# Patient Record
Sex: FEMALE | Race: WHITE | NOT HISPANIC OR LATINO
[De-identification: names, ages, dates, MRNs, and addresses within clinical notes are randomized per-mention and may not be internally consistent; named-entity substitution may affect disease eponyms.]

---

## 2017-01-04 ENCOUNTER — APPOINTMENT (OUTPATIENT)
Dept: CARDIOLOGY | Facility: CLINIC | Age: 67
End: 2017-01-04

## 2017-01-11 ENCOUNTER — EMERGENCY (EMERGENCY)
Facility: HOSPITAL | Age: 67
LOS: 1 days | Discharge: HOME | End: 2017-01-11
Admitting: EMERGENCY MEDICINE
Payer: MEDICARE

## 2017-01-11 DIAGNOSIS — M54.9 DORSALGIA, UNSPECIFIED: ICD-10-CM

## 2017-01-11 DIAGNOSIS — Z88.0 ALLERGY STATUS TO PENICILLIN: ICD-10-CM

## 2017-01-11 DIAGNOSIS — M51.16 INTERVERTEBRAL DISC DISORDERS WITH RADICULOPATHY, LUMBAR REGION: ICD-10-CM

## 2017-01-11 DIAGNOSIS — E03.9 HYPOTHYROIDISM, UNSPECIFIED: ICD-10-CM

## 2017-01-11 PROCEDURE — 99284 EMERGENCY DEPT VISIT MOD MDM: CPT

## 2017-01-11 PROCEDURE — 72110 X-RAY EXAM L-2 SPINE 4/>VWS: CPT | Mod: 26

## 2017-01-12 PROCEDURE — 96372 THER/PROPH/DIAG INJ SC/IM: CPT

## 2017-01-12 PROCEDURE — 99283 EMERGENCY DEPT VISIT LOW MDM: CPT | Mod: 25

## 2017-01-12 PROCEDURE — 72110 X-RAY EXAM L-2 SPINE 4/>VWS: CPT

## 2017-01-15 ENCOUNTER — RX RENEWAL (OUTPATIENT)
Age: 67
End: 2017-01-15

## 2017-02-26 ENCOUNTER — EMERGENCY (EMERGENCY)
Facility: HOSPITAL | Age: 67
LOS: 1 days | Discharge: HOME | End: 2017-02-26
Admitting: EMERGENCY MEDICINE
Payer: MEDICARE

## 2017-02-26 DIAGNOSIS — S51.851A OPEN BITE OF RIGHT FOREARM, INITIAL ENCOUNTER: ICD-10-CM
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
## 2017-02-26 DIAGNOSIS — Z88.0 ALLERGY STATUS TO PENICILLIN: ICD-10-CM
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
## 2017-02-26 DIAGNOSIS — E03.9 HYPOTHYROIDISM, UNSPECIFIED: ICD-10-CM

## 2017-02-26 PROCEDURE — 99284 EMERGENCY DEPT VISIT MOD MDM: CPT

## 2017-02-27 ENCOUNTER — INPATIENT (INPATIENT)
Facility: HOSPITAL | Age: 67
LOS: 1 days | Discharge: HOME | DRG: 603 | End: 2017-03-01
Attending: FAMILY MEDICINE | Admitting: HOSPITALIST
Payer: MEDICARE

## 2017-02-27 DIAGNOSIS — L03.113 CELLULITIS OF RIGHT UPPER LIMB: ICD-10-CM

## 2017-02-27 DIAGNOSIS — S61.451A OPEN BITE OF RIGHT HAND, INITIAL ENCOUNTER: ICD-10-CM

## 2017-02-27 DIAGNOSIS — E87.1 HYPO-OSMOLALITY AND HYPONATREMIA: ICD-10-CM
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
## 2017-02-27 DIAGNOSIS — S51.851A OPEN BITE OF RIGHT FOREARM, INITIAL ENCOUNTER: ICD-10-CM
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
## 2017-02-27 DIAGNOSIS — E03.9 HYPOTHYROIDISM, UNSPECIFIED: ICD-10-CM

## 2017-02-27 DIAGNOSIS — Z88.0 ALLERGY STATUS TO PENICILLIN: ICD-10-CM

## 2017-02-27 PROCEDURE — 96372 THER/PROPH/DIAG INJ SC/IM: CPT

## 2017-02-27 PROCEDURE — 99223 1ST HOSP IP/OBS HIGH 75: CPT

## 2017-02-27 PROCEDURE — 73090 X-RAY EXAM OF FOREARM: CPT | Mod: 26,LT

## 2017-02-27 PROCEDURE — 73130 X-RAY EXAM OF HAND: CPT | Mod: 26,RT

## 2017-02-27 PROCEDURE — 99283 EMERGENCY DEPT VISIT LOW MDM: CPT | Mod: 25

## 2017-02-28 PROCEDURE — 99233 SBSQ HOSP IP/OBS HIGH 50: CPT

## 2017-03-01 PROCEDURE — 73130 X-RAY EXAM OF HAND: CPT

## 2017-03-01 PROCEDURE — 80076 HEPATIC FUNCTION PANEL: CPT

## 2017-03-01 PROCEDURE — 90675 RABIES VACCINE IM: CPT

## 2017-03-01 PROCEDURE — 99285 EMERGENCY DEPT VISIT HI MDM: CPT | Mod: 25

## 2017-03-01 PROCEDURE — 85025 COMPLETE CBC W/AUTO DIFF WBC: CPT

## 2017-03-01 PROCEDURE — 96365 THER/PROPH/DIAG IV INF INIT: CPT

## 2017-03-01 PROCEDURE — 82550 ASSAY OF CK (CPK): CPT

## 2017-03-01 PROCEDURE — 80069 RENAL FUNCTION PANEL: CPT

## 2017-03-01 PROCEDURE — 99239 HOSP IP/OBS DSCHRG MGMT >30: CPT

## 2017-03-01 PROCEDURE — 80048 BASIC METABOLIC PNL TOTAL CA: CPT

## 2017-03-01 PROCEDURE — 90375 RABIES IG IM/SC: CPT

## 2017-03-01 PROCEDURE — 85610 PROTHROMBIN TIME: CPT

## 2017-03-01 PROCEDURE — 85027 COMPLETE CBC AUTOMATED: CPT

## 2017-03-01 PROCEDURE — 73090 X-RAY EXAM OF FOREARM: CPT

## 2017-03-01 PROCEDURE — 85730 THROMBOPLASTIN TIME PARTIAL: CPT

## 2017-03-29 ENCOUNTER — TRANSCRIPTION ENCOUNTER (OUTPATIENT)
Age: 67
End: 2017-03-29

## 2017-07-13 ENCOUNTER — RX RENEWAL (OUTPATIENT)
Age: 67
End: 2017-07-13

## 2017-08-24 ENCOUNTER — CLINICAL ADVICE (OUTPATIENT)
Age: 67
End: 2017-08-24

## 2017-08-24 ENCOUNTER — EMERGENCY (EMERGENCY)
Facility: HOSPITAL | Age: 67
LOS: 1 days | Discharge: HOME | End: 2017-08-24
Admitting: EMERGENCY MEDICINE
Payer: MEDICARE

## 2017-08-24 DIAGNOSIS — E03.9 HYPOTHYROIDISM, UNSPECIFIED: ICD-10-CM

## 2017-08-24 DIAGNOSIS — Z79.899 OTHER LONG TERM (CURRENT) DRUG THERAPY: ICD-10-CM

## 2017-08-24 DIAGNOSIS — Z88.0 ALLERGY STATUS TO PENICILLIN: ICD-10-CM

## 2017-08-24 DIAGNOSIS — R07.9 CHEST PAIN, UNSPECIFIED: ICD-10-CM

## 2017-08-24 DIAGNOSIS — R00.2 PALPITATIONS: ICD-10-CM

## 2017-08-24 PROCEDURE — 93010 ELECTROCARDIOGRAM REPORT: CPT

## 2017-08-24 PROCEDURE — 99285 EMERGENCY DEPT VISIT HI MDM: CPT

## 2017-08-24 PROCEDURE — 84439 ASSAY OF FREE THYROXINE: CPT

## 2017-08-24 PROCEDURE — 36415 COLL VENOUS BLD VENIPUNCTURE: CPT

## 2017-08-24 PROCEDURE — 87086 URINE CULTURE/COLONY COUNT: CPT

## 2017-08-24 PROCEDURE — 85610 PROTHROMBIN TIME: CPT

## 2017-08-24 PROCEDURE — 80048 BASIC METABOLIC PNL TOTAL CA: CPT

## 2017-08-24 PROCEDURE — 85730 THROMBOPLASTIN TIME PARTIAL: CPT

## 2017-08-24 PROCEDURE — 71020: CPT | Mod: 26

## 2017-08-24 PROCEDURE — 85027 COMPLETE CBC AUTOMATED: CPT

## 2017-08-24 PROCEDURE — 71046 X-RAY EXAM CHEST 2 VIEWS: CPT

## 2017-08-24 PROCEDURE — 99283 EMERGENCY DEPT VISIT LOW MDM: CPT | Mod: 25

## 2017-08-24 PROCEDURE — 84480 ASSAY TRIIODOTHYRONINE (T3): CPT

## 2017-08-24 PROCEDURE — 93005 ELECTROCARDIOGRAM TRACING: CPT

## 2017-08-24 PROCEDURE — 84484 ASSAY OF TROPONIN QUANT: CPT

## 2017-08-24 PROCEDURE — 81002 URINALYSIS NONAUTO W/O SCOPE: CPT

## 2017-08-24 PROCEDURE — 84443 ASSAY THYROID STIM HORMONE: CPT

## 2017-08-25 ENCOUNTER — APPOINTMENT (OUTPATIENT)
Dept: CARDIOLOGY | Facility: CLINIC | Age: 67
End: 2017-08-25
Payer: MEDICARE

## 2017-08-28 ENCOUNTER — APPOINTMENT (OUTPATIENT)
Dept: CARDIOLOGY | Facility: CLINIC | Age: 67
End: 2017-08-28
Payer: MEDICARE

## 2017-08-28 PROCEDURE — 93351 STRESS TTE COMPLETE: CPT

## 2017-08-28 PROCEDURE — 93320 DOPPLER ECHO COMPLETE: CPT

## 2017-08-28 PROCEDURE — 93325 DOPPLER ECHO COLOR FLOW MAPG: CPT

## 2017-09-06 ENCOUNTER — APPOINTMENT (OUTPATIENT)
Dept: CARDIOLOGY | Facility: CLINIC | Age: 67
End: 2017-09-06

## 2017-09-08 ENCOUNTER — APPOINTMENT (OUTPATIENT)
Dept: ULTRASOUND IMAGING | Facility: HOSPITAL | Age: 67
End: 2017-09-08

## 2017-09-18 ENCOUNTER — OUTPATIENT (OUTPATIENT)
Dept: OUTPATIENT SERVICES | Facility: HOSPITAL | Age: 67
LOS: 1 days | End: 2017-09-18
Payer: MEDICARE

## 2017-09-18 ENCOUNTER — APPOINTMENT (OUTPATIENT)
Dept: ULTRASOUND IMAGING | Facility: HOSPITAL | Age: 67
End: 2017-09-18

## 2017-09-18 DIAGNOSIS — N83.202 UNSPECIFIED OVARIAN CYST, LEFT SIDE: ICD-10-CM

## 2017-09-18 DIAGNOSIS — N83.201 UNSPECIFIED OVARIAN CYST, RIGHT SIDE: ICD-10-CM

## 2017-09-18 DIAGNOSIS — D25.9 LEIOMYOMA OF UTERUS, UNSPECIFIED: ICD-10-CM

## 2017-09-18 PROCEDURE — 76830 TRANSVAGINAL US NON-OB: CPT | Mod: 26

## 2017-09-18 PROCEDURE — 76830 TRANSVAGINAL US NON-OB: CPT

## 2017-09-18 PROCEDURE — 76856 US EXAM PELVIC COMPLETE: CPT

## 2017-09-18 PROCEDURE — 76856 US EXAM PELVIC COMPLETE: CPT | Mod: 26

## 2017-09-27 ENCOUNTER — OUTPATIENT (OUTPATIENT)
Dept: OUTPATIENT SERVICES | Facility: HOSPITAL | Age: 67
LOS: 1 days | End: 2017-09-27
Payer: MEDICARE

## 2017-09-27 ENCOUNTER — APPOINTMENT (OUTPATIENT)
Dept: MRI IMAGING | Facility: HOSPITAL | Age: 67
End: 2017-09-27

## 2017-09-27 DIAGNOSIS — D25.9 LEIOMYOMA OF UTERUS, UNSPECIFIED: ICD-10-CM

## 2017-09-27 DIAGNOSIS — Z00.8 ENCOUNTER FOR OTHER GENERAL EXAMINATION: ICD-10-CM

## 2017-09-27 PROCEDURE — 72197 MRI PELVIS W/O & W/DYE: CPT

## 2017-09-27 PROCEDURE — A9579: CPT

## 2017-09-27 PROCEDURE — 72197 MRI PELVIS W/O & W/DYE: CPT | Mod: 26

## 2017-10-04 ENCOUNTER — APPOINTMENT (OUTPATIENT)
Dept: OBGYN | Facility: CLINIC | Age: 67
End: 2017-10-04
Payer: MEDICARE

## 2017-10-04 ENCOUNTER — RESULT REVIEW (OUTPATIENT)
Age: 67
End: 2017-10-04

## 2017-10-04 PROCEDURE — G0101: CPT

## 2017-12-20 ENCOUNTER — RX RENEWAL (OUTPATIENT)
Age: 67
End: 2017-12-20

## 2018-02-19 ENCOUNTER — EMERGENCY (EMERGENCY)
Facility: HOSPITAL | Age: 68
LOS: 1 days | Discharge: HOME | End: 2018-02-19
Admitting: INTERNAL MEDICINE
Payer: MEDICARE

## 2018-02-19 DIAGNOSIS — E03.9 HYPOTHYROIDISM, UNSPECIFIED: ICD-10-CM

## 2018-02-19 DIAGNOSIS — Z88.0 ALLERGY STATUS TO PENICILLIN: ICD-10-CM
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
## 2018-02-19 DIAGNOSIS — Z79.899 OTHER LONG TERM (CURRENT) DRUG THERAPY: ICD-10-CM
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
## 2018-02-19 DIAGNOSIS — M79.89 OTHER SPECIFIED SOFT TISSUE DISORDERS: ICD-10-CM

## 2018-02-19 DIAGNOSIS — S43.402A UNSPECIFIED SPRAIN OF LEFT SHOULDER JOINT, INITIAL ENCOUNTER: ICD-10-CM
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
## 2018-02-19 DIAGNOSIS — S60.222A CONTUSION OF LEFT HAND, INITIAL ENCOUNTER: ICD-10-CM

## 2018-02-19 PROCEDURE — 73110 X-RAY EXAM OF WRIST: CPT

## 2018-02-19 PROCEDURE — 73130 X-RAY EXAM OF HAND: CPT | Mod: 26,LT

## 2018-02-19 PROCEDURE — 73130 X-RAY EXAM OF HAND: CPT

## 2018-02-19 PROCEDURE — 99284 EMERGENCY DEPT VISIT MOD MDM: CPT

## 2018-02-19 PROCEDURE — 99284 EMERGENCY DEPT VISIT MOD MDM: CPT | Mod: 25

## 2018-02-19 PROCEDURE — 73030 X-RAY EXAM OF SHOULDER: CPT | Mod: 26,LT

## 2018-02-19 PROCEDURE — 73030 X-RAY EXAM OF SHOULDER: CPT

## 2018-02-19 PROCEDURE — 73110 X-RAY EXAM OF WRIST: CPT | Mod: 26,LT

## 2018-03-30 ENCOUNTER — NON-APPOINTMENT (OUTPATIENT)
Age: 68
End: 2018-03-30

## 2018-03-30 ENCOUNTER — APPOINTMENT (OUTPATIENT)
Dept: CARDIOLOGY | Facility: CLINIC | Age: 68
End: 2018-03-30
Payer: MEDICARE

## 2018-03-30 VITALS
SYSTOLIC BLOOD PRESSURE: 113 MMHG | RESPIRATION RATE: 16 BRPM | WEIGHT: 119 LBS | DIASTOLIC BLOOD PRESSURE: 71 MMHG | OXYGEN SATURATION: 99 % | HEART RATE: 57 BPM | HEIGHT: 65 IN | BODY MASS INDEX: 19.83 KG/M2

## 2018-03-30 DIAGNOSIS — A69.20 LYME DISEASE, UNSPECIFIED: ICD-10-CM

## 2018-03-30 PROCEDURE — 93000 ELECTROCARDIOGRAM COMPLETE: CPT

## 2018-03-30 PROCEDURE — 99214 OFFICE O/P EST MOD 30 MIN: CPT

## 2018-04-03 ENCOUNTER — NON-APPOINTMENT (OUTPATIENT)
Age: 68
End: 2018-04-03

## 2018-04-04 ENCOUNTER — NON-APPOINTMENT (OUTPATIENT)
Age: 68
End: 2018-04-04

## 2018-04-04 PROCEDURE — 93224 XTRNL ECG REC UP TO 48 HRS: CPT

## 2018-06-06 ENCOUNTER — APPOINTMENT (OUTPATIENT)
Dept: CARDIOLOGY | Facility: CLINIC | Age: 68
End: 2018-06-06

## 2018-06-11 ENCOUNTER — RX RENEWAL (OUTPATIENT)
Age: 68
End: 2018-06-11

## 2018-12-10 ENCOUNTER — RX RENEWAL (OUTPATIENT)
Age: 68
End: 2018-12-10

## 2019-01-16 ENCOUNTER — APPOINTMENT (OUTPATIENT)
Dept: ULTRASOUND IMAGING | Facility: CLINIC | Age: 69
End: 2019-01-16

## 2019-01-16 ENCOUNTER — APPOINTMENT (OUTPATIENT)
Dept: MAMMOGRAPHY | Facility: CLINIC | Age: 69
End: 2019-01-16

## 2019-01-22 ENCOUNTER — APPOINTMENT (OUTPATIENT)
Dept: ULTRASOUND IMAGING | Facility: HOSPITAL | Age: 69
End: 2019-01-22

## 2019-03-10 ENCOUNTER — EMERGENCY (EMERGENCY)
Facility: HOSPITAL | Age: 69
LOS: 1 days | Discharge: HOME | End: 2019-03-10
Attending: EMERGENCY MEDICINE | Admitting: EMERGENCY MEDICINE
Payer: MEDICARE

## 2019-03-10 ENCOUNTER — TRANSCRIPTION ENCOUNTER (OUTPATIENT)
Age: 69
End: 2019-03-10

## 2019-03-10 VITALS
TEMPERATURE: 98 F | HEART RATE: 69 BPM | OXYGEN SATURATION: 100 % | DIASTOLIC BLOOD PRESSURE: 78 MMHG | WEIGHT: 115.08 LBS | SYSTOLIC BLOOD PRESSURE: 151 MMHG | RESPIRATION RATE: 18 BRPM

## 2019-03-10 DIAGNOSIS — R42 DIZZINESS AND GIDDINESS: ICD-10-CM

## 2019-03-10 LAB
ALBUMIN SERPL ELPH-MCNC: 3.9 G/DL — SIGNIFICANT CHANGE UP (ref 3.3–5)
ALP SERPL-CCNC: 83 U/L — SIGNIFICANT CHANGE UP (ref 40–120)
ALT FLD-CCNC: 27 U/L DA — SIGNIFICANT CHANGE UP (ref 10–45)
ANION GAP SERPL CALC-SCNC: 7 MMOL/L — SIGNIFICANT CHANGE UP (ref 5–17)
AST SERPL-CCNC: 26 U/L — SIGNIFICANT CHANGE UP (ref 10–40)
BILIRUB SERPL-MCNC: 0.3 MG/DL — SIGNIFICANT CHANGE UP (ref 0.2–1.2)
BUN SERPL-MCNC: 20 MG/DL — SIGNIFICANT CHANGE UP (ref 7–23)
CALCIUM SERPL-MCNC: 9.2 MG/DL — SIGNIFICANT CHANGE UP (ref 8.4–10.5)
CHLORIDE SERPL-SCNC: 101 MMOL/L — SIGNIFICANT CHANGE UP (ref 96–108)
CO2 SERPL-SCNC: 28 MMOL/L — SIGNIFICANT CHANGE UP (ref 22–31)
CREAT SERPL-MCNC: 0.58 MG/DL — SIGNIFICANT CHANGE UP (ref 0.5–1.3)
GLUCOSE SERPL-MCNC: 100 MG/DL — HIGH (ref 70–99)
HCT VFR BLD CALC: 36.4 % — SIGNIFICANT CHANGE UP (ref 34.5–45)
HGB BLD-MCNC: 12.5 G/DL — SIGNIFICANT CHANGE UP (ref 11.5–15.5)
MCHC RBC-ENTMCNC: 31.7 PG — SIGNIFICANT CHANGE UP (ref 27–34)
MCHC RBC-ENTMCNC: 34.5 GM/DL — SIGNIFICANT CHANGE UP (ref 32–36)
MCV RBC AUTO: 92 FL — SIGNIFICANT CHANGE UP (ref 80–100)
PLATELET # BLD AUTO: 218 K/UL — SIGNIFICANT CHANGE UP (ref 150–400)
POTASSIUM SERPL-MCNC: 4 MMOL/L — SIGNIFICANT CHANGE UP (ref 3.5–5.3)
POTASSIUM SERPL-SCNC: 4 MMOL/L — SIGNIFICANT CHANGE UP (ref 3.5–5.3)
PROT SERPL-MCNC: 7.3 G/DL — SIGNIFICANT CHANGE UP (ref 6–8.3)
RBC # BLD: 3.96 M/UL — SIGNIFICANT CHANGE UP (ref 3.8–5.2)
RBC # FLD: 11.4 % — SIGNIFICANT CHANGE UP (ref 10.3–14.5)
SODIUM SERPL-SCNC: 136 MMOL/L — SIGNIFICANT CHANGE UP (ref 135–145)
TROPONIN I SERPL-MCNC: <.017 NG/ML — LOW (ref 0.02–0.06)
WBC # BLD: 5.8 K/UL — SIGNIFICANT CHANGE UP (ref 3.8–10.5)
WBC # FLD AUTO: 5.8 K/UL — SIGNIFICANT CHANGE UP (ref 3.8–10.5)

## 2019-03-10 PROCEDURE — 99284 EMERGENCY DEPT VISIT MOD MDM: CPT

## 2019-03-10 PROCEDURE — 85027 COMPLETE CBC AUTOMATED: CPT

## 2019-03-10 PROCEDURE — 80053 COMPREHEN METABOLIC PANEL: CPT

## 2019-03-10 PROCEDURE — 93010 ELECTROCARDIOGRAM REPORT: CPT

## 2019-03-10 PROCEDURE — 99284 EMERGENCY DEPT VISIT MOD MDM: CPT | Mod: 25

## 2019-03-10 PROCEDURE — 93005 ELECTROCARDIOGRAM TRACING: CPT

## 2019-03-10 PROCEDURE — 96360 HYDRATION IV INFUSION INIT: CPT

## 2019-03-10 PROCEDURE — 36415 COLL VENOUS BLD VENIPUNCTURE: CPT

## 2019-03-10 PROCEDURE — 84484 ASSAY OF TROPONIN QUANT: CPT

## 2019-03-10 RX ORDER — SODIUM CHLORIDE 9 MG/ML
1000 INJECTION INTRAMUSCULAR; INTRAVENOUS; SUBCUTANEOUS ONCE
Qty: 0 | Refills: 0 | Status: COMPLETED | OUTPATIENT
Start: 2019-03-10 | End: 2019-03-10

## 2019-03-10 RX ORDER — MECLIZINE HCL 12.5 MG
1 TABLET ORAL
Qty: 21 | Refills: 0
Start: 2019-03-10 | End: 2019-03-16

## 2019-03-10 RX ORDER — MECLIZINE HCL 12.5 MG
25 TABLET ORAL ONCE
Qty: 0 | Refills: 0 | Status: COMPLETED | OUTPATIENT
Start: 2019-03-10 | End: 2019-03-10

## 2019-03-10 RX ADMIN — SODIUM CHLORIDE 1000 MILLILITER(S): 9 INJECTION INTRAMUSCULAR; INTRAVENOUS; SUBCUTANEOUS at 19:18

## 2019-03-10 RX ADMIN — Medication 25 MILLIGRAM(S): at 18:17

## 2019-03-10 RX ADMIN — SODIUM CHLORIDE 1000 MILLILITER(S): 9 INJECTION INTRAMUSCULAR; INTRAVENOUS; SUBCUTANEOUS at 18:18

## 2019-03-10 NOTE — ED PROVIDER NOTE - OBJECTIVE STATEMENT
69 y/o F with PMH of chronic dizziness ?vertigo, lyme disease presents to the ED with 69 y/o F with PMH of chronic dizziness ?vertigo, lyme disease presents to the ED with c/o feeling lightheaded x yesterday, sometimes worse with sitting up and standing. Reports her symptoms improved after eating yesterday. Denies recent URI, CP, SOB, palpitations, n/v/d, abd pain, extremity weakness, HA, visual changes or all other neuro symptoms. Reports she followed with Dr. Rubi, had "normal" stress test and echo ~1 yr ago.

## 2019-03-10 NOTE — ED PROVIDER NOTE - PROGRESS NOTE DETAILS
SHELBY Perales: labs reviewed. Patient reports she feels better with meclizine. Will d.c with instruction to f/u with her PCP

## 2019-03-10 NOTE — ED PROVIDER NOTE - CLINICAL SUMMARY MEDICAL DECISION MAKING FREE TEXT BOX
imp- positional lightheadedness x yesterday, PE unremarkable ?vertigo VS ACS   plan- labs, trop, saline, meclizine, reassess

## 2019-03-10 NOTE — ED PROVIDER NOTE - ATTENDING CONTRIBUTION TO CARE
nila with SHELBY Dozier. Pt c/o dizziness and has h/o vertigo. She had recent normal cardiac w/u within year by [facility]. She denies chest pain or sob. Plan to check ekg and trop. Fluids and meclizine. Reassess. Patient exam normal. She is ambulatory. Dizziness is intermittent and depending upon particular head movement or position. I performed a face to face bedside interview with patient regarding history of present illness, review of symptoms and past medical history. I completed an independent physical exam.  I have discussed the patient's plan of care with Physician Assistant (PA). I agree with note as stated above, having amended the EMR as needed to reflect my findings.   This includes History of Present Illness, HIV, Past Medical/Surgical/Family/Social History, Allergies and Home Medications, Review of Systems, Physical Exam, and any Progress Notes during the time I functioned as the attending physician for this patient.

## 2019-03-10 NOTE — ED PROVIDER NOTE - NSFOLLOWUPINSTRUCTIONS_ED_ALL_ED_FT
Follow up your test results with your primary care physician within 1 week.    Take the prescribed medications as directed     Stay hydrated    Return to the ER if your symptoms worsen, high fevers, severe pain or for any other medical emergencies

## 2019-03-19 ENCOUNTER — APPOINTMENT (OUTPATIENT)
Dept: MAMMOGRAPHY | Facility: HOSPITAL | Age: 69
End: 2019-03-19
Payer: MEDICARE

## 2019-03-19 ENCOUNTER — APPOINTMENT (OUTPATIENT)
Dept: ULTRASOUND IMAGING | Facility: HOSPITAL | Age: 69
End: 2019-03-19

## 2019-03-19 ENCOUNTER — OUTPATIENT (OUTPATIENT)
Dept: OUTPATIENT SERVICES | Facility: HOSPITAL | Age: 69
LOS: 1 days | End: 2019-03-19
Payer: MEDICARE

## 2019-03-19 DIAGNOSIS — R92.2 INCONCLUSIVE MAMMOGRAM: ICD-10-CM

## 2019-03-19 PROCEDURE — 77067 SCR MAMMO BI INCL CAD: CPT | Mod: 26

## 2019-03-19 PROCEDURE — 77063 BREAST TOMOSYNTHESIS BI: CPT | Mod: 26

## 2019-03-19 PROCEDURE — 77067 SCR MAMMO BI INCL CAD: CPT

## 2019-03-19 PROCEDURE — 76641 ULTRASOUND BREAST COMPLETE: CPT

## 2019-03-19 PROCEDURE — 76641 ULTRASOUND BREAST COMPLETE: CPT | Mod: 26,50

## 2019-03-19 PROCEDURE — 77063 BREAST TOMOSYNTHESIS BI: CPT

## 2019-04-29 ENCOUNTER — OUTPATIENT (OUTPATIENT)
Dept: OUTPATIENT SERVICES | Facility: HOSPITAL | Age: 69
LOS: 1 days | End: 2019-04-29
Payer: MEDICARE

## 2019-04-29 ENCOUNTER — APPOINTMENT (OUTPATIENT)
Dept: ULTRASOUND IMAGING | Facility: HOSPITAL | Age: 69
End: 2019-04-29
Payer: MEDICAID

## 2019-04-29 DIAGNOSIS — Z00.8 ENCOUNTER FOR OTHER GENERAL EXAMINATION: ICD-10-CM

## 2019-04-29 PROCEDURE — 76830 TRANSVAGINAL US NON-OB: CPT

## 2019-04-29 PROCEDURE — 76856 US EXAM PELVIC COMPLETE: CPT

## 2019-04-29 PROCEDURE — 76856 US EXAM PELVIC COMPLETE: CPT | Mod: 26

## 2019-04-29 PROCEDURE — 76830 TRANSVAGINAL US NON-OB: CPT | Mod: 26

## 2019-05-29 ENCOUNTER — RX RENEWAL (OUTPATIENT)
Age: 69
End: 2019-05-29

## 2019-08-01 ENCOUNTER — APPOINTMENT (OUTPATIENT)
Dept: RADIOLOGY | Facility: HOSPITAL | Age: 69
End: 2019-08-01
Payer: MEDICARE

## 2019-08-01 ENCOUNTER — OUTPATIENT (OUTPATIENT)
Dept: OUTPATIENT SERVICES | Facility: HOSPITAL | Age: 69
LOS: 1 days | End: 2019-08-01
Payer: MEDICARE

## 2019-08-01 DIAGNOSIS — Z00.8 ENCOUNTER FOR OTHER GENERAL EXAMINATION: ICD-10-CM

## 2019-08-01 PROCEDURE — 72040 X-RAY EXAM NECK SPINE 2-3 VW: CPT | Mod: 26

## 2019-08-01 PROCEDURE — 72040 X-RAY EXAM NECK SPINE 2-3 VW: CPT

## 2019-08-16 ENCOUNTER — OUTPATIENT (OUTPATIENT)
Dept: OUTPATIENT SERVICES | Facility: HOSPITAL | Age: 69
LOS: 1 days | End: 2019-08-16
Payer: MEDICARE

## 2019-08-16 ENCOUNTER — APPOINTMENT (OUTPATIENT)
Dept: MRI IMAGING | Facility: HOSPITAL | Age: 69
End: 2019-08-16
Payer: MEDICARE

## 2019-08-16 DIAGNOSIS — Z00.8 ENCOUNTER FOR OTHER GENERAL EXAMINATION: ICD-10-CM

## 2019-08-16 PROCEDURE — 72141 MRI NECK SPINE W/O DYE: CPT | Mod: 26

## 2019-08-16 PROCEDURE — A9579: CPT

## 2019-08-16 PROCEDURE — 70553 MRI BRAIN STEM W/O & W/DYE: CPT | Mod: 26

## 2019-08-16 PROCEDURE — 72141 MRI NECK SPINE W/O DYE: CPT

## 2019-08-16 PROCEDURE — 70553 MRI BRAIN STEM W/O & W/DYE: CPT

## 2019-11-17 ENCOUNTER — EMERGENCY (EMERGENCY)
Facility: HOSPITAL | Age: 69
LOS: 1 days | Discharge: HOME | End: 2019-11-17
Attending: EMERGENCY MEDICINE | Admitting: EMERGENCY MEDICINE
Payer: MEDICARE

## 2019-11-17 VITALS
OXYGEN SATURATION: 100 % | SYSTOLIC BLOOD PRESSURE: 132 MMHG | HEART RATE: 61 BPM | TEMPERATURE: 98 F | HEIGHT: 61 IN | WEIGHT: 115.08 LBS | RESPIRATION RATE: 18 BRPM | DIASTOLIC BLOOD PRESSURE: 73 MMHG

## 2019-11-17 DIAGNOSIS — S29.9XXA UNSPECIFIED INJURY OF THORAX, INITIAL ENCOUNTER: ICD-10-CM

## 2019-11-17 DIAGNOSIS — Z98.890 OTHER SPECIFIED POSTPROCEDURAL STATES: Chronic | ICD-10-CM

## 2019-11-17 PROCEDURE — 71101 X-RAY EXAM UNILAT RIBS/CHEST: CPT

## 2019-11-17 PROCEDURE — 71046 X-RAY EXAM CHEST 2 VIEWS: CPT

## 2019-11-17 PROCEDURE — 71100 X-RAY EXAM RIBS UNI 2 VIEWS: CPT | Mod: 26,LT

## 2019-11-17 PROCEDURE — 71046 X-RAY EXAM CHEST 2 VIEWS: CPT | Mod: 26

## 2019-11-17 PROCEDURE — 99283 EMERGENCY DEPT VISIT LOW MDM: CPT

## 2019-11-17 PROCEDURE — 99284 EMERGENCY DEPT VISIT MOD MDM: CPT

## 2019-11-17 RX ORDER — LIDOCAINE 4 G/100G
1 CREAM TOPICAL ONCE
Refills: 0 | Status: COMPLETED | OUTPATIENT
Start: 2019-11-17 | End: 2019-11-17

## 2019-11-17 RX ADMIN — LIDOCAINE 1 PATCH: 4 CREAM TOPICAL at 15:51

## 2019-11-17 NOTE — ED PROVIDER NOTE - CARE PLAN
Principal Discharge DX:	Rib injury Principal Discharge DX:	Closed fracture of multiple ribs of left side, initial encounter

## 2019-11-17 NOTE — ED PROVIDER NOTE - NSFOLLOWUPINSTRUCTIONS_ED_ALL_ED_FT
1. Tylenol or Motrin for pain  2. Incentive spirometry for 15 mins every hours  3. Follow up with your doctor in 1-2 days  4. Return to the ED for worsening pain, difficulty breathing or any concerns  ***************    Rib Contusion  A rib contusion is a deep bruise on your rib area. Contusions are the result of a blunt trauma that causes bleeding and injury to the tissues under the skin. A rib contusion may involve bruising of the ribs and of the skin and muscles in the area. The skin over the contusion may turn blue, purple, or yellow. Minor injuries will give you a painless contusion. More severe contusions may stay painful and swollen for a few weeks.  What are the causes?  This condition is usually caused by a blow, trauma, or direct force to an area of the body. This often occurs while playing contact sports.  What are the signs or symptoms?  Symptoms of this condition include:  Swelling and redness of the injured area.Discoloration of the injured area.Tenderness and soreness of the injured area.Pain with or without movement.How is this diagnosed?  This condition may be diagnosed based on:  Your symptoms and medical history.A physical exam.Imaging tests—such as an X-ray, CT scan, or MRI—to determine if there were internal injuries or broken bones (fractures).How is this treated?  This condition may be treated with:  Rest. This is often the best treatment for a rib contusion.Icing. This reduces swelling and inflammation.Deep-breathing exercises. These may be recommended to reduce the risk for lung collapse and pneumonia.Medicines. Over-the-counter or prescription medicines may be given to control pain.Injection of a numbing medicine around the nerve near your injury (nerve block).Follow these instructions at home:  Image       Image   Medicines     Take over-the-counter and prescription medicines only as told by your health care provider.Do not drive or use heavy machinery while taking prescription pain medicine.If you are taking prescription pain medicine, take actions to prevent or treat constipation. Your health care provider may recommend that you:   Drink enough fluid to keep your urine pale yellow. Eat foods that are high in fiber, such as fresh fruits and vegetables, whole grains, and beans. Limit foods that are high in fat and processed sugars, such as fried or sweet foods. Take an over-the-counter or prescription medicine for constipation.Managing pain, stiffness, and swelling     If directed, put ice on the injured area:  Put ice in a plastic bag.Place a towel between your skin and the bag.Leave the ice on for 20 minutes, 2–3 times a day.Rest the injured area. Avoid strenuous activity and any activities or movements that cause pain. Be careful during activities and avoid bumping the injured area.Do not lift anything that is heavier than 5 lb (2.3 kg), or the limit that you are told, until your health care provider says that it is safe.General instructions     Do not use any products that contain nicotine or tobacco, such as cigarettes and e-cigarettes. These can delay healing. If you need help quitting, ask your health care provider.Do deep-breathing exercises as told by your health care provider.If you were given an incentive spirometer, use it every 1–2 hours while you are awake, or as recommended by your health care provider. This device measures how well you are filling your lungs with each breath.Keep all follow-up visits as told by your health care provider. This is important.Contact a health care provider if you have:  Increased bruising or swelling.Pain that is not controlled with treatment.A fever.Get help right away if you:  Have difficulty breathing or shortness of breath.Develop a continual cough or you cough up thick or bloody sputum.Feel nauseous or you vomit.Have pain in your abdomen.Summary  A rib contusion is a deep bruise on your rib area. Contusions are the result of a blunt trauma that causes bleeding and injury to the tissues under the skin.The skin overlying the contusion may turn blue, purple, or yellow. Minor injuries may give you a painless contusion. More severe contusions may stay painful and swollen for a few weeks.Rest the injured area. Avoid strenuous activity and any activities or movements that cause pain.This information is not intended to replace advice given to you by your health care provider. Make sure you discuss any questions you have with your health care provider. 1. Tylenol or Motrin for pain  2. Incentive spirometry for 15 mins every hours  3. Follow up with your doctor in 1-2 days  4. Return to the ED for worsening pain, difficulty breathing or any concerns  ***************  Rib Fracture    WHAT YOU NEED TO KNOW:    A rib fracture is a crack or break in a rib bone. Rib fractures usually heal within 6 weeks. You should be able to return to normal activities before that time. Do not wrap anything around your body to try to splint your ribs. This can prevent you from taking deep breaths and increases your risk for pneumonia.Rib Cage         DISCHARGE INSTRUCTIONS:    Call 911 for any of the following:     You have trouble breathing.      You have new or increased pain.    Return to the emergency department if:     Your pain does not get better, even after treatment.      You have a fever or a cough.     Contact your healthcare provider if:     You have questions or concerns about your condition or care.        Medicines:     NSAIDs help decrease swelling and pain. NSAIDs are available without a doctor's order. Ask your healthcare provider which medicine is right for you. Ask how much to take and when to take it. Take as directed. NSAIDs can cause stomach bleeding and kidney problems if not taken correctly.      Prescription pain medicine may be given. Ask your healthcare provider how to take this medicine safely. Some prescription pain medicines contain acetaminophen. Do not take other medicines that contain acetaminophen without talking to your healthcare provider. Too much acetaminophen may cause liver damage. Prescription pain medicine may cause constipation. Ask your healthcare provider how to prevent or treat constipation.       Take your medicine as directed. Contact your healthcare provider if you think your medicine is not helping or if you have side effects. Tell him or her if you are allergic to any medicine. Keep a list of the medicines, vitamins, and herbs you take. Include the amounts, and when and why you take them. Bring the list or the pill bottles to follow-up visits. Carry your medicine list with you in case of an emergency.    Follow up with your healthcare provider as directed: Write down your questions so you remember to ask them during your visits.    Deep breathing: Deep breathing will decrease your risk for pneumonia. Hug a pillow on the injured side while doing this exercise, to decrease pain. Take a deep breath and hold it for as long as possible. You should let the air out and then cough strongly. Deep breaths help open your airway. You may be given an incentive spirometer to help take deep breaths. Put the plastic piece in your mouth. Take a slow, deep breath. You should then let the air out and cough. Repeat these steps 10 times every hour.    Rest: Rest and limit activity to decrease swelling and pain, and allow your injury to heal. Avoid activities that may cause more pain or damage to your ribs such as, pulling, pushing, and lifting. As your pain decreases, begin movements slowly. Take short walks between rest periods.    Ice: Apply ice on the fractured area for 15 to 20 minutes every hour or as directed. Use an ice pack or put crushed ice in a plastic bag. Cover it with a towel. Ice helps prevent tissue damage and decreases swelling and pain.

## 2019-11-17 NOTE — ED PROVIDER NOTE - PROGRESS NOTE DETAILS
Patti: discussed with pt at length results of xray and using spirometer. Discussed with patient need to return to ED if symptoms don't continue to improve or recur or develops any new or worsening symptoms that are of concern.

## 2019-11-17 NOTE — ED ADULT NURSE NOTE - NSIMPLEMENTINTERV_GEN_ALL_ED
Implemented All Universal Safety Interventions:  [place] to call system. Call bell, personal items and telephone within reach. Instruct patient to call for assistance. Room bathroom lighting operational. Non-slip footwear when patient is off stretcher. Physically safe environment: no spills, clutter or unnecessary equipment. Stretcher in lowest position, wheels locked, appropriate side rails in place.

## 2019-11-17 NOTE — ED PROVIDER NOTE - CLINICAL SUMMARY MEDICAL DECISION MAKING FREE TEXT BOX
Dr. Driver: 69F h/o vertigo, lyme disease p/w left sided rib pain x 1 week, constant, after pushing a heavy  with her body. No trauma. No chest pain or sob. Took motrin with some relief. No fevers, chills or cough. On exam pt is well appearing, nad, rrr, ctab, abdo soft/nt/nd, +ttp over left inferior lateral ribs. Concern for rib contusion. Will get xray r/o fx, pain ctrl, incentive spirometry.

## 2019-11-17 NOTE — ED PROVIDER NOTE - OBJECTIVE STATEMENT
67 y/o F with PMH of chronic dizziness ?vertigo, lyme disease presents with left sided rib pain for 1 week after pushing a heavy  and the bar pushed into her lower ribs. went to PCP 3 days ago and dx with msk strain. denies cp, sob, n/v, fever, chills, abd pain 70 y/o F with PMH of chronic dizziness ?vertigo, lyme disease presents with left sided rib pain for 1 week after pushing a heavy  and the bar pushed into her lower ribs. went to PCP 3 days ago and dx with msk strain. denies cp, sob, n/v, fever, chills, abd pain

## 2019-11-17 NOTE — ED PROVIDER NOTE - ATTENDING CONTRIBUTION TO CARE
Dr. Driver: I performed a face to face bedside interview with patient regarding history of present illness, review of symptoms and past medical history. I completed an independent physical exam.  I have discussed patient's plan of care with PA.   I agree with note as stated above, having amended the EMR as needed to reflect my findings.   This includes HISTORY OF PRESENT ILLNESS, HIV, PAST MEDICAL/SURGICAL/FAMILY/SOCIAL HISTORY, ALLERGIES AND HOME MEDICATIONS, REVIEW OF SYSTEMS, PHYSICAL EXAM, and any PROGRESS NOTES during the time I functioned as the attending physician for this patient.    see mdm

## 2019-11-17 NOTE — ED PROVIDER NOTE - SKIN, MLM
No bruising to left ribs or abd. Skin normal color for race, warm, dry and intact. No evidence of rash.

## 2019-11-17 NOTE — ED PROVIDER NOTE - MUSCULOSKELETAL, MLM
TTP left lateral lower ribs. no deformity or step off. Spine appears normal, range of motion is not limited, no muscle or joint tenderness

## 2019-11-17 NOTE — ED PROVIDER NOTE - PATIENT PORTAL LINK FT
You can access the FollowMyHealth Patient Portal offered by [facility] by registering at the following website: [email or web address]. By joining [facility]’s FollowMyHealth portal, you will also be able to view your health information using other applications (apps) compatible with our system.

## 2019-11-18 NOTE — ED POST DISCHARGE NOTE - RESULT SUMMARY
Called and spoke with pt regarding 1cm lesion in midthoracic. Rec further evaluation and imaging. Pt is going to follow up with Dr. Up. Discussed with patient need to return to ED if symptoms don't continue to improve or recur or develops any new or worsening symptoms that are of concern.

## 2019-11-24 ENCOUNTER — RX RENEWAL (OUTPATIENT)
Age: 69
End: 2019-11-24

## 2020-01-26 ENCOUNTER — EMERGENCY (EMERGENCY)
Facility: HOSPITAL | Age: 70
LOS: 1 days | Discharge: HOME | End: 2020-01-26
Attending: INTERNAL MEDICINE | Admitting: INTERNAL MEDICINE
Payer: MEDICARE

## 2020-01-26 VITALS
DIASTOLIC BLOOD PRESSURE: 82 MMHG | HEART RATE: 66 BPM | RESPIRATION RATE: 16 BRPM | TEMPERATURE: 98 F | OXYGEN SATURATION: 99 % | HEIGHT: 64 IN | SYSTOLIC BLOOD PRESSURE: 137 MMHG | WEIGHT: 115.96 LBS

## 2020-01-26 VITALS
SYSTOLIC BLOOD PRESSURE: 134 MMHG | HEART RATE: 62 BPM | DIASTOLIC BLOOD PRESSURE: 68 MMHG | OXYGEN SATURATION: 98 % | TEMPERATURE: 98 F | RESPIRATION RATE: 18 BRPM

## 2020-01-26 DIAGNOSIS — Z98.890 OTHER SPECIFIED POSTPROCEDURAL STATES: Chronic | ICD-10-CM

## 2020-01-26 DIAGNOSIS — M79.669 PAIN IN UNSPECIFIED LOWER LEG: ICD-10-CM

## 2020-01-26 PROBLEM — R53.82 CHRONIC FATIGUE, UNSPECIFIED: Chronic | Status: ACTIVE | Noted: 2019-11-17

## 2020-01-26 PROBLEM — R42 DIZZINESS AND GIDDINESS: Chronic | Status: ACTIVE | Noted: 2019-11-17

## 2020-01-26 PROBLEM — E03.9 HYPOTHYROIDISM, UNSPECIFIED: Chronic | Status: ACTIVE | Noted: 2019-11-17

## 2020-01-26 PROBLEM — F41.9 ANXIETY DISORDER, UNSPECIFIED: Chronic | Status: ACTIVE | Noted: 2019-11-17

## 2020-01-26 PROBLEM — A69.20 LYME DISEASE, UNSPECIFIED: Chronic | Status: ACTIVE | Noted: 2019-11-17

## 2020-01-26 PROBLEM — F32.9 MAJOR DEPRESSIVE DISORDER, SINGLE EPISODE, UNSPECIFIED: Chronic | Status: ACTIVE | Noted: 2019-11-17

## 2020-01-26 PROBLEM — B27.90 INFECTIOUS MONONUCLEOSIS, UNSPECIFIED WITHOUT COMPLICATION: Chronic | Status: ACTIVE | Noted: 2019-11-17

## 2020-01-26 PROCEDURE — 99284 EMERGENCY DEPT VISIT MOD MDM: CPT

## 2020-01-26 PROCEDURE — 93971 EXTREMITY STUDY: CPT | Mod: 26,RT

## 2020-01-26 PROCEDURE — 93971 EXTREMITY STUDY: CPT

## 2020-01-26 PROCEDURE — 99284 EMERGENCY DEPT VISIT MOD MDM: CPT | Mod: 25

## 2020-01-26 NOTE — ED PROVIDER NOTE - NSFOLLOWUPINSTRUCTIONS_ED_ALL_ED_FT
Please follow-up with your doctor(s) within the next 3 days, but see medical sooner if your symptoms persist or worsen.  Please call tomorrow for an appointment.    You were given a copy of your labs and/or imaging.  Please go-over these with your doctor(s).     If you have any worsening of symptoms or any other concerns please see your doctor or return to the ED immediately.    Take Motrin 600mg every 8hrs with food for pain     Please continue taking your home medications as directed.  Do not use alcohol when taking any medication (especially antibiotics, tylenol or other pain medication) unless you check with the doctor or pharmacist.          Bakers Cyst    WHAT YOU NEED TO KNOW:    A Bakers cyst, or popliteal cyst, is a bulging lump behind your knee. Inside the lump is a sac filled with fluid. The cyst is caused by fluid buildup in your knee joint. This can happen if you have a knee injury, such as a cartilage tear. Osteoarthritis or rheumatoid arthritis can also cause an abnormal buildup of joint fluid.     DISCHARGE INSTRUCTIONS:    Return to the emergency department if:     You have severe pain.      You have bruising on the ankle below the cyst.      Your calf turns blue below the cyst.      Your calf or knee is swollen or bleeding.    Contact your healthcare provider if:     You have a fever.      Your pain does not improve with medicine.      You have questions or concerns about your condition or care.    Medicines:     NSAIDs help decrease swelling and pain. This medicine is available without a doctor's order. Your healthcare provider will tell you which medicine to take and how often to take it. Follow directions. NSAIDs can cause stomach bleeding or kidney problems if they are not taken correctly.      Take your medicine as directed. Contact your healthcare provider if you think your medicine is not helping or if you have side effects. Tell him of her if you are allergic to any medicine. Keep a list of the medicines, vitamins, and herbs you take. Include the amounts, and when and why you take them. Bring the list or the pill bottles to follow-up visits. Carry your medicine list with you in case of an emergency.    Care for your knee:     Rest as needed. Limit movement as your knee heals. This will help decrease the risk of more damage to your knee. You may need crutches to take weight off your injured knee. Use crutches as directed.      Ice your knee. Ice helps decrease swelling and pain. Use an ice pack, or put ice in a plastic bag. Cover the ice pack with a towel and place the ice on your knee for 15 to 20 minutes, 3 to 4 times each day. Do this for 2 to 3 days.      Support your knee. Wrap your knee with an elastic bandage. Ask your healthcare provider if you need a brace for more support. This will help decrease swelling and movement so your knee can heal.      Elevate your knee. Use pillows to raise your knee above the level of your heart as often as you can. This will help decrease swelling.      Go to physical therapy as directed. A physical therapist teaches you exercises to help improve movement and strength, and to decrease pain.     Follow up with your healthcare provider as directed: Write down your questions so you remember to ask them during your visits.

## 2020-01-26 NOTE — ED PROVIDER NOTE - CARE PROVIDER_API CALL
Nate Gibbons)  Surgery  [address], [address]  [place], [place] [id number]  Phone: [phone]  Fax: [phone]  Follow Up Time:

## 2020-01-26 NOTE — ED PROVIDER NOTE - CLINICAL SUMMARY MEDICAL DECISION MAKING FREE TEXT BOX
pt 69y f c/o right leg pain x 10 days. pain is intermittent and in different locations. sometimes upper thigh, sometimes knee, sometimes lower leg  denies numbness, tingling, fever, chills, weakness. worse walking down steps  pt concerned b/c her friend told her she could have a blood clot. no hx DVT, no recent travel, no hx dvt/pe  unremarkable exam. bakers cyst on doppler. will f/u with pcp/vascular

## 2020-01-26 NOTE — ED PROVIDER NOTE - PHYSICAL EXAMINATION
General:     NAD, well-nourished, well-appearing  Eyes: PERRL  Head:     NC/AT, EOMI, oral mucosa moist  Neck:     trachea midline  Lungs:     CTA b/l  CVS:     RRR  Abd:     +BS, s/nt/nd  Ext:  right lower ext: FROM, sensation intact, soft compartment, normal cap refill   Neuro: AAOx3, no sensory/motor deficits

## 2020-01-26 NOTE — ED PROVIDER NOTE - OBJECTIVE STATEMENT
pt 69y f c/o right leg pain x 10 days. pain is intermittent and in different locations. sometimes upper thigh, sometimes knee, sometimes lower leg  denies numbness, tingling, fever, chills, weakness. worse walking down steps  pt concerned b/c her friend told her she could have a blood clot. no hx DVT, no recent travel, no hx dvt/pe

## 2020-01-26 NOTE — ED ADULT NURSE NOTE - PMH
Anxiety    Chronic fatigue syndrome    Depression    Hypothyroid    Lyme disease    Mononucleosis    Vertigo

## 2020-01-26 NOTE — ED PROVIDER NOTE - PATIENT PORTAL LINK FT
You can access the FollowMyHealth Patient Portal offered by [facility] by registering at the following website: [email or web address]. By joining [employer]’s FollowMyHealth portal, you will also be able to view your health information using other applications (apps) compatible with our system.

## 2020-01-26 NOTE — ED ADULT NURSE NOTE - NSIMPLEMENTINTERV_GEN_ALL_ED
Implemented All Universal Safety Interventions:  [place] to call system. Call bell, personal items and telephone within reach. Instruct patient to call for assistance. Room bathroom lighting operational. Non-slip footwear when patient is off stretcher. Physically safe environment: no spills, clutter or unnecessary equipment. Stretcher in lowest position, wheels locked, appropriate side rails in place.

## 2020-02-05 NOTE — ED ADULT TRIAGE NOTE - WEIGHT IN KG
Patient advised of notes per Dr Humberto Morrow. Verbalized understanding and agreed with plan. 52.2

## 2020-05-18 ENCOUNTER — RX RENEWAL (OUTPATIENT)
Age: 70
End: 2020-05-18

## 2020-08-10 ENCOUNTER — TRANSCRIPTION ENCOUNTER (OUTPATIENT)
Age: 70
End: 2020-08-10

## 2020-11-11 ENCOUNTER — RX RENEWAL (OUTPATIENT)
Age: 70
End: 2020-11-11

## 2021-01-17 ENCOUNTER — TRANSCRIPTION ENCOUNTER (OUTPATIENT)
Age: 71
End: 2021-01-17

## 2021-01-22 NOTE — ED ADULT NURSE NOTE - PMH
Anxiety    Chronic fatigue syndrome    Depression    Hypothyroid    Lyme disease    Mononucleosis    Vertigo
5

## 2021-03-26 ENCOUNTER — OUTPATIENT (OUTPATIENT)
Dept: OUTPATIENT SERVICES | Facility: HOSPITAL | Age: 71
LOS: 1 days | Discharge: HOME | End: 2021-03-26

## 2021-03-26 DIAGNOSIS — C43.9 MALIGNANT MELANOMA OF SKIN, UNSPECIFIED: ICD-10-CM

## 2021-03-26 DIAGNOSIS — Z98.890 OTHER SPECIFIED POSTPROCEDURAL STATES: Chronic | ICD-10-CM

## 2021-03-31 ENCOUNTER — APPOINTMENT (OUTPATIENT)
Dept: HEMATOLOGY ONCOLOGY | Facility: CLINIC | Age: 71
End: 2021-03-31
Payer: MEDICARE

## 2021-03-31 VITALS
WEIGHT: 118.61 LBS | HEART RATE: 67 BPM | BODY MASS INDEX: 20.25 KG/M2 | OXYGEN SATURATION: 100 % | HEIGHT: 64.17 IN | RESPIRATION RATE: 16 BRPM | SYSTOLIC BLOOD PRESSURE: 138 MMHG | TEMPERATURE: 97.8 F | DIASTOLIC BLOOD PRESSURE: 78 MMHG

## 2021-03-31 DIAGNOSIS — Z86.59 PERSONAL HISTORY OF OTHER MENTAL AND BEHAVIORAL DISORDERS: ICD-10-CM

## 2021-03-31 DIAGNOSIS — Z80.1 FAMILY HISTORY OF MALIGNANT NEOPLASM OF TRACHEA, BRONCHUS AND LUNG: ICD-10-CM

## 2021-03-31 DIAGNOSIS — Z87.898 PERSONAL HISTORY OF OTHER SPECIFIED CONDITIONS: ICD-10-CM

## 2021-03-31 DIAGNOSIS — Z60.2 PROBLEMS RELATED TO LIVING ALONE: ICD-10-CM

## 2021-03-31 DIAGNOSIS — Z98.890 OTHER SPECIFIED POSTPROCEDURAL STATES: ICD-10-CM

## 2021-03-31 DIAGNOSIS — Z86.19 PERSONAL HISTORY OF OTHER INFECTIOUS AND PARASITIC DISEASES: ICD-10-CM

## 2021-03-31 DIAGNOSIS — Z85.41 PERSONAL HISTORY OF MALIGNANT NEOPLASM OF CERVIX UTERI: ICD-10-CM

## 2021-03-31 PROCEDURE — 99205 OFFICE O/P NEW HI 60 MIN: CPT

## 2021-03-31 PROCEDURE — 99072 ADDL SUPL MATRL&STAF TM PHE: CPT

## 2021-03-31 SDOH — SOCIAL STABILITY - SOCIAL INSECURITY: PROBLEMS RELATED TO LIVING ALONE: Z60.2

## 2021-03-31 NOTE — PHYSICAL EXAM
[Fully active, able to carry on all pre-disease performance without restriction] : Status 0 - Fully active, able to carry on all pre-disease performance without restriction [Normal] : affect appropriate [Thin] : thin [de-identified] : right flank surgical scar healed-no adjacent nodularity

## 2021-03-31 NOTE — CONSULT LETTER
[Dear  ___] : Dear  [unfilled], [Consult Letter:] : I had the pleasure of evaluating your patient, [unfilled]. [Please see my note below.] : Please see my note below. [Consult Closing:] : Thank you very much for allowing me to participate in the care of this patient.  If you have any questions, please do not hesitate to contact me. [Sincerely,] : Sincerely, [FreeTextEntry3] : Ariane Short MD

## 2021-03-31 NOTE — REVIEW OF SYSTEMS
[Patient Intake Form Reviewed] : Patient intake form was reviewed [Negative] : Allergic/Immunologic [Fatigue] : fatigue [Skin Rash] : no skin rash

## 2021-03-31 NOTE — OB HISTORY
[Post-Menopause, No Sxs] : post-menopausal, currently without symptoms [Menarche Age: ____] : age at menarche was [unfilled] [Menopause Age: ____] : age at menopause was [unfilled] [___] : Living: [unfilled]

## 2021-03-31 NOTE — ASSESSMENT
[FreeTextEntry1] : Stage T1a (1A) malignant melanoma–no vascular or neural invasion or ulceration on pathology.  Status post wide excision with no residual lesion.  Will ask to have pathology reviewed by [facility] pathology.\par Discussed with patient diagnosis/prognosis and management options for melanoma.  With early stage disease and no symptomatology, holding on imaging at this time, and no plans for adjuvant therapy, as per NCCN guidelines.\par \par Patient given prescription for screening mammogram as this needs to be updated.\par \par Patient was given the opportunity to ask questions.  Her questions have been answered to her apparent satisfaction at this time.

## 2021-03-31 NOTE — HISTORY OF PRESENT ILLNESS
[Disease: _____________________] : Disease: [unfilled] [T: ___] : T[unfilled] [AJCC Stage: ____] : AJCC Stage: [unfilled] [de-identified] : 2/2021-Patient presented to her dermatologist ([facility])-had full body scan, for screening evaluation-found suspicious lesion right side.\par Had biopsy of right flank lesion with pathology revealing melanoma measuring 0.32 mm.  No vascular or neuro invasion or ulceration.  She subsequently had excision of the area with pathology revealing no residual melanocytic proliferation.  Margins free (portion of skin that 4.7 x 2 x 0.8 cm).  Dermatologist recommended continued surveillance.\par Has 3 month f/u with dermatologist planned.\par \par No pulmonary/GI//bony or neurologic complaints.\par No fevers, recent infections, abnormal bruising or bleeding reported. [de-identified] : melanoma

## 2021-04-05 ENCOUNTER — APPOINTMENT (OUTPATIENT)
Dept: MAMMOGRAPHY | Facility: HOSPITAL | Age: 71
End: 2021-04-05
Payer: MEDICARE

## 2021-04-05 ENCOUNTER — OUTPATIENT (OUTPATIENT)
Dept: OUTPATIENT SERVICES | Facility: HOSPITAL | Age: 71
LOS: 1 days | End: 2021-04-05
Payer: MEDICARE

## 2021-04-05 ENCOUNTER — RESULT REVIEW (OUTPATIENT)
Age: 71
End: 2021-04-05

## 2021-04-05 DIAGNOSIS — Z98.890 OTHER SPECIFIED POSTPROCEDURAL STATES: Chronic | ICD-10-CM

## 2021-04-05 DIAGNOSIS — C43.9 MALIGNANT MELANOMA OF SKIN, UNSPECIFIED: ICD-10-CM

## 2021-04-05 PROCEDURE — 77063 BREAST TOMOSYNTHESIS BI: CPT | Mod: 26

## 2021-04-05 PROCEDURE — 77067 SCR MAMMO BI INCL CAD: CPT

## 2021-04-05 PROCEDURE — 77067 SCR MAMMO BI INCL CAD: CPT | Mod: 26

## 2021-04-05 PROCEDURE — 77063 BREAST TOMOSYNTHESIS BI: CPT

## 2021-04-06 LAB
ALBUMIN SERPL ELPH-MCNC: 4.7 G/DL
ALP BLD-CCNC: 83 U/L
ALT SERPL-CCNC: 25 U/L
ANION GAP SERPL CALC-SCNC: 8 MMOL/L
AST SERPL-CCNC: 24 U/L
BASOPHILS # BLD AUTO: 0.04 K/UL
BASOPHILS NFR BLD AUTO: 0.8 %
BILIRUB SERPL-MCNC: 0.4 MG/DL
BUN SERPL-MCNC: 22 MG/DL
CALCIUM SERPL-MCNC: 10.1 MG/DL
CHLORIDE SERPL-SCNC: 100 MMOL/L
CO2 SERPL-SCNC: 28 MMOL/L
CREAT SERPL-MCNC: 0.55 MG/DL
EOSINOPHIL # BLD AUTO: 0.09 K/UL
EOSINOPHIL NFR BLD AUTO: 1.9 %
GLUCOSE SERPL-MCNC: 109 MG/DL
HCT VFR BLD CALC: 37.4 %
HGB BLD-MCNC: 12.3 G/DL
IMM GRANULOCYTES NFR BLD AUTO: 0.2 %
LDH SERPL-CCNC: 193 U/L
LYMPHOCYTES # BLD AUTO: 1.24 K/UL
LYMPHOCYTES NFR BLD AUTO: 25.6 %
MAN DIFF?: NORMAL
MCHC RBC-ENTMCNC: 30.7 PG
MCHC RBC-ENTMCNC: 32.9 GM/DL
MCV RBC AUTO: 93.3 FL
MONOCYTES # BLD AUTO: 0.53 K/UL
MONOCYTES NFR BLD AUTO: 10.9 %
NEUTROPHILS # BLD AUTO: 2.94 K/UL
NEUTROPHILS NFR BLD AUTO: 60.6 %
PLATELET # BLD AUTO: 235 K/UL
POTASSIUM SERPL-SCNC: 4 MMOL/L
PROT SERPL-MCNC: 6.6 G/DL
RBC # BLD: 4.01 M/UL
RBC # FLD: 12.2 %
SODIUM SERPL-SCNC: 136 MMOL/L
WBC # FLD AUTO: 4.85 K/UL

## 2021-04-13 ENCOUNTER — NON-APPOINTMENT (OUTPATIENT)
Age: 71
End: 2021-04-13

## 2021-04-23 ENCOUNTER — RESULT REVIEW (OUTPATIENT)
Age: 71
End: 2021-04-23

## 2021-04-25 ENCOUNTER — TRANSCRIPTION ENCOUNTER (OUTPATIENT)
Age: 71
End: 2021-04-25

## 2021-04-28 ENCOUNTER — OUTPATIENT (OUTPATIENT)
Dept: OUTPATIENT SERVICES | Facility: HOSPITAL | Age: 71
LOS: 1 days | Discharge: HOME | End: 2021-04-28

## 2021-04-28 DIAGNOSIS — C43.9 MALIGNANT MELANOMA OF SKIN, UNSPECIFIED: ICD-10-CM

## 2021-04-28 DIAGNOSIS — Z98.890 OTHER SPECIFIED POSTPROCEDURAL STATES: Chronic | ICD-10-CM

## 2021-04-29 ENCOUNTER — APPOINTMENT (OUTPATIENT)
Dept: HEMATOLOGY ONCOLOGY | Facility: CLINIC | Age: 71
End: 2021-04-29
Payer: MEDICARE

## 2021-04-29 DIAGNOSIS — D64.9 ANEMIA, UNSPECIFIED: ICD-10-CM

## 2021-04-29 PROCEDURE — 99213 OFFICE O/P EST LOW 20 MIN: CPT | Mod: 95

## 2021-04-29 NOTE — ASSESSMENT
[FreeTextEntry1] : Lab work, path consult reviewed.\par Stage T1a (1A) malignant melanoma–no vascular or neural invasion or ulceration on pathology.  Status post wide excision with no residual lesion. To continue expectant surveillance/dermatologic f/u.\par \par Patient was given the opportunity to ask questions.  Her questions have been answered to her apparent satisfaction at this time.

## 2021-04-29 NOTE — PHYSICAL EXAM
[Normal] : affect appropriate [de-identified] : breathing appeared unlabored [de-identified] : coloration appeared normal

## 2021-04-29 NOTE — HISTORY OF PRESENT ILLNESS
[Disease: _____________________] : Disease: [unfilled] [T: ___] : T[unfilled] [AJCC Stage: ____] : AJCC Stage: [unfilled] [de-identified] : 2/2021-Patient presented to her dermatologist ([facility])-had full body scan, for screening evaluation-found suspicious lesion right side.\par Had biopsy of right flank lesion with pathology revealing melanoma measuring 0.32 mm.  No vascular or neuro invasion or ulceration.  She subsequently had excision of the area with pathology revealing no residual melanocytic proliferation.  Margins free (portion of skin that 4.7 x 2 x 0.8 cm).  Dermatologist recommended continued surveillance.\par  [de-identified] : melanoma [de-identified] : Telehealth visit due to COVID concerns.\par Had dental appt. for palate discomfort, who referred patient to Dr. Syed (ENT MD) for evaluation. Has f/u planned for full head and neck eval.\par Went to urgent care center in [place] recently-negative testing for COVID. Has not had COVID vaccine yet.\par No pulmonary/GI//bony or neurologic complaints.\par No abnormal bruising or bleeding reported.\par Had 3 month dermatology skin check-states was good, though to continue under close surveillance.

## 2021-04-29 NOTE — REASON FOR VISIT
[Home] : at home, [unfilled] , at the time of the visit. [Medical Office: ([facility])___] : at the medical office located in  [Verbal consent obtained from patient] : the patient, [unfilled] [Follow-Up Visit] : a follow-up [FreeTextEntry2] : melanoma

## 2021-04-30 ENCOUNTER — NON-APPOINTMENT (OUTPATIENT)
Age: 71
End: 2021-04-30

## 2021-04-30 DIAGNOSIS — Z00.00 ENCOUNTER FOR GENERAL ADULT MEDICAL EXAMINATION W/OUT ABNORMAL FINDINGS: ICD-10-CM

## 2021-05-06 ENCOUNTER — RX RENEWAL (OUTPATIENT)
Age: 71
End: 2021-05-06

## 2021-05-27 ENCOUNTER — RESULT REVIEW (OUTPATIENT)
Age: 71
End: 2021-05-27

## 2021-05-27 ENCOUNTER — OUTPATIENT (OUTPATIENT)
Dept: OUTPATIENT SERVICES | Facility: HOSPITAL | Age: 71
LOS: 1 days | End: 2021-05-27
Payer: MEDICARE

## 2021-05-27 ENCOUNTER — APPOINTMENT (OUTPATIENT)
Dept: ULTRASOUND IMAGING | Facility: HOSPITAL | Age: 71
End: 2021-05-27
Payer: MEDICARE

## 2021-05-27 DIAGNOSIS — Z00.8 ENCOUNTER FOR OTHER GENERAL EXAMINATION: ICD-10-CM

## 2021-05-27 DIAGNOSIS — Z98.890 OTHER SPECIFIED POSTPROCEDURAL STATES: Chronic | ICD-10-CM

## 2021-05-27 PROCEDURE — 76641 ULTRASOUND BREAST COMPLETE: CPT | Mod: 26,50

## 2021-05-27 PROCEDURE — 76641 ULTRASOUND BREAST COMPLETE: CPT

## 2021-07-13 ENCOUNTER — APPOINTMENT (OUTPATIENT)
Dept: ULTRASOUND IMAGING | Facility: HOSPITAL | Age: 71
End: 2021-07-13
Payer: MEDICARE

## 2021-07-13 ENCOUNTER — OUTPATIENT (OUTPATIENT)
Dept: OUTPATIENT SERVICES | Facility: HOSPITAL | Age: 71
LOS: 1 days | End: 2021-07-13
Payer: MEDICARE

## 2021-07-13 ENCOUNTER — RESULT REVIEW (OUTPATIENT)
Age: 71
End: 2021-07-13

## 2021-07-13 DIAGNOSIS — Z98.890 OTHER SPECIFIED POSTPROCEDURAL STATES: Chronic | ICD-10-CM

## 2021-07-13 DIAGNOSIS — Z00.8 ENCOUNTER FOR OTHER GENERAL EXAMINATION: ICD-10-CM

## 2021-07-13 PROCEDURE — 76536 US EXAM OF HEAD AND NECK: CPT

## 2021-07-13 PROCEDURE — 76536 US EXAM OF HEAD AND NECK: CPT | Mod: 26

## 2021-10-27 ENCOUNTER — OUTPATIENT (OUTPATIENT)
Dept: OUTPATIENT SERVICES | Facility: HOSPITAL | Age: 71
LOS: 1 days | Discharge: HOME | End: 2021-10-27

## 2021-10-27 DIAGNOSIS — C43.9 MALIGNANT MELANOMA OF SKIN, UNSPECIFIED: ICD-10-CM

## 2021-10-27 DIAGNOSIS — Z98.890 OTHER SPECIFIED POSTPROCEDURAL STATES: Chronic | ICD-10-CM

## 2021-10-28 ENCOUNTER — APPOINTMENT (OUTPATIENT)
Dept: HEMATOLOGY ONCOLOGY | Facility: CLINIC | Age: 71
End: 2021-10-28
Payer: MEDICARE

## 2021-10-28 VITALS
SYSTOLIC BLOOD PRESSURE: 121 MMHG | DIASTOLIC BLOOD PRESSURE: 73 MMHG | RESPIRATION RATE: 18 BRPM | BODY MASS INDEX: 19.79 KG/M2 | WEIGHT: 115.94 LBS | TEMPERATURE: 97.7 F | HEART RATE: 63 BPM | HEIGHT: 64.13 IN | OXYGEN SATURATION: 100 %

## 2021-10-28 DIAGNOSIS — Z80.7 FAMILY HISTORY OF OTHER MALIGNANT NEOPLASMS OF LYMPHOID, HEMATOPOIETIC AND RELATED TISSUES: ICD-10-CM

## 2021-10-28 PROCEDURE — 99213 OFFICE O/P EST LOW 20 MIN: CPT

## 2021-10-28 NOTE — CONSULT LETTER
[Dear  ___] : Dear  [unfilled], [Courtesy Letter:] : I had the pleasure of seeing your patient, [unfilled], in my office today. [Please see my note below.] : Please see my note below. [Consult Closing:] : Thank you very much for allowing me to participate in the care of this patient.  If you have any questions, please do not hesitate to contact me. [Sincerely,] : Sincerely, [FreeTextEntry3] : Ariane Short MD

## 2021-10-28 NOTE — ASSESSMENT
[FreeTextEntry1] : Lab work, breast US results reviewed.\par Stage T1a (1A) malignant melanoma–no vascular or neural invasion or ulceration on pathology.  Status post wide excision with no residual lesion. Clinically GAVINO. To continue expectant surveillance/dermatologic f/u.\par \par Health maintenance–patient stated she will get prescriptions for her next mammogram/breast ultrasound from her gynecologist.\par \par Patient was given the opportunity to ask questions.  Her questions have been answered to her apparent satisfaction at this time.

## 2021-10-28 NOTE — HISTORY OF PRESENT ILLNESS
[Disease: _____________________] : Disease: [unfilled] [T: ___] : T[unfilled] [AJCC Stage: ____] : AJCC Stage: [unfilled] [de-identified] : 2/2021-Patient presented to her dermatologist ([facility])-had full body scan, for screening evaluation-found suspicious lesion right side.\par Had biopsy of right flank lesion with pathology revealing melanoma measuring 0.32 mm.  No vascular or neuro invasion or ulceration.  She subsequently had excision of the area with pathology revealing no residual melanocytic proliferation.  Margins free (portion of skin that 4.7 x 2 x 0.8 cm).  Dermatologist recommended continued surveillance.\par  [de-identified] : melanoma [de-identified] : Saw dermatologist last week-excised 2 skin lesions with path pending.\par Father (age 97) passed away since last visit-condolences offered.\par Saw dentist for right dental pain. Told has TMJ. Saw ENT MD Dr. Syed for eval. for persistent right neck discomfort-CT ordered-plans to do next week.\par No pulmonary/GI/ or neurologic complaints.\par No abnormal bruising or bleeding reported.\par Has appt. with GYN. MD scheduled 12/2021-has h/o uterine fibroid-plans to have f/u pelvic US. Also to get scripts for breast imaging from her.

## 2021-11-04 ENCOUNTER — APPOINTMENT (OUTPATIENT)
Dept: CT IMAGING | Facility: HOSPITAL | Age: 71
End: 2021-11-04
Payer: MEDICARE

## 2021-11-04 ENCOUNTER — OUTPATIENT (OUTPATIENT)
Dept: OUTPATIENT SERVICES | Facility: HOSPITAL | Age: 71
LOS: 1 days | End: 2021-11-04
Payer: MEDICARE

## 2021-11-04 DIAGNOSIS — Z98.890 OTHER SPECIFIED POSTPROCEDURAL STATES: Chronic | ICD-10-CM

## 2021-11-04 DIAGNOSIS — Z00.8 ENCOUNTER FOR OTHER GENERAL EXAMINATION: ICD-10-CM

## 2021-11-04 PROCEDURE — 70491 CT SOFT TISSUE NECK W/DYE: CPT | Mod: 26

## 2021-11-04 PROCEDURE — 70491 CT SOFT TISSUE NECK W/DYE: CPT

## 2021-11-10 ENCOUNTER — NON-APPOINTMENT (OUTPATIENT)
Age: 71
End: 2021-11-10

## 2021-11-15 ENCOUNTER — RESULT REVIEW (OUTPATIENT)
Age: 71
End: 2021-11-15

## 2021-11-15 ENCOUNTER — APPOINTMENT (OUTPATIENT)
Dept: CT IMAGING | Facility: HOSPITAL | Age: 71
End: 2021-11-15
Payer: MEDICARE

## 2021-11-15 ENCOUNTER — OUTPATIENT (OUTPATIENT)
Dept: OUTPATIENT SERVICES | Facility: HOSPITAL | Age: 71
LOS: 1 days | End: 2021-11-15
Payer: MEDICARE

## 2021-11-15 DIAGNOSIS — R91.8 OTHER NONSPECIFIC ABNORMAL FINDING OF LUNG FIELD: ICD-10-CM

## 2021-11-15 DIAGNOSIS — Z98.890 OTHER SPECIFIED POSTPROCEDURAL STATES: Chronic | ICD-10-CM

## 2021-11-15 PROCEDURE — 71260 CT THORAX DX C+: CPT | Mod: 26

## 2021-11-15 PROCEDURE — 71260 CT THORAX DX C+: CPT

## 2021-11-16 ENCOUNTER — NON-APPOINTMENT (OUTPATIENT)
Age: 71
End: 2021-11-16

## 2021-11-17 ENCOUNTER — NON-APPOINTMENT (OUTPATIENT)
Age: 71
End: 2021-11-17

## 2021-11-22 ENCOUNTER — APPOINTMENT (OUTPATIENT)
Dept: NUCLEAR MEDICINE | Facility: CLINIC | Age: 71
End: 2021-11-22
Payer: MEDICARE

## 2021-11-22 ENCOUNTER — OUTPATIENT (OUTPATIENT)
Dept: OUTPATIENT SERVICES | Facility: HOSPITAL | Age: 71
LOS: 1 days | End: 2021-11-22
Payer: MEDICARE

## 2021-11-22 DIAGNOSIS — Z98.890 OTHER SPECIFIED POSTPROCEDURAL STATES: Chronic | ICD-10-CM

## 2021-11-22 DIAGNOSIS — R91.1 SOLITARY PULMONARY NODULE: ICD-10-CM

## 2021-11-22 PROCEDURE — 78815 PET IMAGE W/CT SKULL-THIGH: CPT

## 2021-11-22 PROCEDURE — A9552: CPT

## 2021-11-22 PROCEDURE — 78815 PET IMAGE W/CT SKULL-THIGH: CPT | Mod: 26,PI

## 2021-11-23 ENCOUNTER — NON-APPOINTMENT (OUTPATIENT)
Age: 71
End: 2021-11-23

## 2021-11-24 ENCOUNTER — NON-APPOINTMENT (OUTPATIENT)
Age: 71
End: 2021-11-24

## 2021-11-29 ENCOUNTER — NON-APPOINTMENT (OUTPATIENT)
Age: 71
End: 2021-11-29

## 2021-12-01 ENCOUNTER — APPOINTMENT (OUTPATIENT)
Dept: THORACIC SURGERY | Facility: CLINIC | Age: 71
End: 2021-12-01
Payer: MEDICARE

## 2021-12-01 PROCEDURE — 99205 OFFICE O/P NEW HI 60 MIN: CPT | Mod: 95

## 2021-12-02 ENCOUNTER — NON-APPOINTMENT (OUTPATIENT)
Age: 71
End: 2021-12-02

## 2021-12-02 ENCOUNTER — APPOINTMENT (OUTPATIENT)
Dept: CARDIOLOGY | Facility: CLINIC | Age: 71
End: 2021-12-02
Payer: MEDICARE

## 2021-12-02 VITALS
TEMPERATURE: 97.6 F | HEIGHT: 64 IN | WEIGHT: 114 LBS | BODY MASS INDEX: 19.46 KG/M2 | RESPIRATION RATE: 16 BRPM | HEART RATE: 70 BPM | SYSTOLIC BLOOD PRESSURE: 130 MMHG | OXYGEN SATURATION: 97 % | DIASTOLIC BLOOD PRESSURE: 74 MMHG

## 2021-12-02 DIAGNOSIS — R60.9 EDEMA, UNSPECIFIED: ICD-10-CM

## 2021-12-02 DIAGNOSIS — I31.9 DISEASE OF PERICARDIUM, UNSPECIFIED: ICD-10-CM

## 2021-12-02 PROCEDURE — 99214 OFFICE O/P EST MOD 30 MIN: CPT

## 2021-12-02 PROCEDURE — 93000 ELECTROCARDIOGRAM COMPLETE: CPT

## 2021-12-03 ENCOUNTER — NON-APPOINTMENT (OUTPATIENT)
Age: 71
End: 2021-12-03

## 2021-12-03 NOTE — CONSULT LETTER
[Dear  ___] : Dear  [unfilled], [Consult Letter:] : I had the pleasure of evaluating your patient, [unfilled]. [( Thank you for referring [unfilled] for consultation for _____ )] : Thank you for referring [unfilled] for consultation for [unfilled] [Please see my note below.] : Please see my note below. [Consult Closing:] : Thank you very much for allowing me to participate in the care of this patient.  If you have any questions, please do not hesitate to contact me. [Sincerely,] : Sincerely, [FreeTextEntry2] : Dr. Hendrix ([facility])/ref [FreeTextEntry3] : Ross Escobar MD, FACS \par , Thoracic Surgery, [facility]\par Chief of Division of Thoracic Surgery, [facility]\par Department of Cardiovascular & Thoracic Surgery \par , [facility] [employer] [facility]\par

## 2021-12-03 NOTE — ASSESSMENT
[FreeTextEntry1] : Ms. MANASA MCRAE, 71 year old female, never a smoker (+ second hand smoke), w/ hx of recently diagnosed Stage IA malignant melaoma.  s/p wide excision of flank on 2/15/21, p/w neck pain s/p CT Neck which revealed RLL groundglass opacity. 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 Further CT Chest reveals other solid and groundglass pulmonary nodules. \par \par I have independently reviewed the medical records and imaging at the time of this office consultation. Lesion in RLL likely cancerous or  pre-cancerous. Discussed that surgical resection would provide the most definitive diagnosis. Therefore, have recommended Robotic Assisted, Right VATS, Right superior segmentectomy. Risks, benefits and alternatives explained to patient; All questions answered and patient agrees to proceed with surgery. \par Cardiac clearance and pulmonary function testing will be required prior to surgery. \par \par Recommendations reviewed with patient during this office visit, and all questions answered; Patient instructed on the importance of follow up and verbalizes understanding.\par \par I personally performed the services described in the documentation, reviewed the documentation recorded by the scribe in my presence and it accurately and completely records my words and actions.\par \par I, LAWRENCE Rios-C, am scribing for and the presence of VINAY Chaudhry, the following sections HISTORY OF PRESENT ILLNESS, PAST MEDICAL/FAMILY/SOCIAL HISTORY; REVIEW OF SYSTEMS; VITAL SIGNS; PHYSICAL EXAM; DISPOSITION.\par \par

## 2021-12-03 NOTE — HISTORY OF PRESENT ILLNESS
[Home] : at home, [unfilled] , at the time of the visit. [Medical Office: ([facility])___] : at the medical office located in  [Verbal consent obtained from patient] : the patient, [unfilled] [FreeTextEntry1] : Ms. MANASA MCRAE, 71 year old female, never a smoker (+ second hand smoke), w/ hx of recently diagnosed Stage IA malignant melaoma.  s/p wide excision of flank on 2/15/21, p/w neck pain s/p CT Neck which revealed RLL groundglass opacity. 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 Further CT Chest reveals other solid and groundglass pulmonary nodules. \par \par CT Chest w/IVC on 11/15/21:\par - 3.7 cm superior right lower lobe part solid, cystic and groundglass lesion with 5 mm solid component on mediastinal windows (2:183), concerning for an adenocarcinoma spectrum lesion. The lesion tethers the right major fissure and adjacent mediastinal pleura.\par - Numerous other solid and groundglass nodules measuring up to 6 mm RUL (image 142) \par - Other several scattered small groundglass and solid nodules including right lower lobe series 3 image 78, left upper lobe image 35 and left lower lobe image 127. 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 \par - The adjacent 6 mm groundglass nodule just superiorly (image 78) and additional scattered small groundglass and solid nodules, for example 3 mm nodules in the right lower lobe (image 102) and left upper lobe (image 73) are too small to characterize.\par - Fibroid uterus with no associated abnormal FDG activity.\par    \par Patient is here today for CT Sx consultation via telehealth, referred by Dr. Hendrix ([facility]). Today, patient denies worsening SOB, chest pain, cough, hemoptysis, fever, chills, night sweats, lightheadedness or dizziness. \par

## 2021-12-04 ENCOUNTER — APPOINTMENT (OUTPATIENT)
Dept: ULTRASOUND IMAGING | Facility: HOSPITAL | Age: 71
End: 2021-12-04
Payer: MEDICARE

## 2021-12-04 ENCOUNTER — RESULT REVIEW (OUTPATIENT)
Age: 71
End: 2021-12-04

## 2021-12-04 ENCOUNTER — OUTPATIENT (OUTPATIENT)
Dept: OUTPATIENT SERVICES | Facility: HOSPITAL | Age: 71
LOS: 1 days | End: 2021-12-04
Payer: MEDICARE

## 2021-12-04 DIAGNOSIS — Z98.890 OTHER SPECIFIED POSTPROCEDURAL STATES: Chronic | ICD-10-CM

## 2021-12-04 DIAGNOSIS — R60.9 EDEMA, UNSPECIFIED: ICD-10-CM

## 2021-12-04 PROCEDURE — 93970 EXTREMITY STUDY: CPT

## 2021-12-04 PROCEDURE — 93970 EXTREMITY STUDY: CPT | Mod: 26

## 2021-12-08 ENCOUNTER — APPOINTMENT (OUTPATIENT)
Dept: CARDIOLOGY | Facility: CLINIC | Age: 71
End: 2021-12-08
Payer: MEDICARE

## 2021-12-08 LAB
ALBUMIN SERPL ELPH-MCNC: 4.6 G/DL
ALP BLD-CCNC: 84 U/L
ALT SERPL-CCNC: 19 U/L
ANION GAP SERPL CALC-SCNC: 11 MMOL/L
AST SERPL-CCNC: 21 U/L
BASOPHILS # BLD AUTO: 0.05 K/UL
BASOPHILS NFR BLD AUTO: 1 %
BILIRUB SERPL-MCNC: 0.4 MG/DL
BUN SERPL-MCNC: 17 MG/DL
CALCIUM SERPL-MCNC: 9.6 MG/DL
CHLORIDE SERPL-SCNC: 100 MMOL/L
CO2 SERPL-SCNC: 25 MMOL/L
CREAT SERPL-MCNC: 0.73 MG/DL
EOSINOPHIL # BLD AUTO: 0.19 K/UL
EOSINOPHIL NFR BLD AUTO: 4 %
ESTIMATED AVERAGE GLUCOSE: 114 MG/DL
GLUCOSE SERPL-MCNC: 88 MG/DL
HBA1C MFR BLD HPLC: 5.6 %
HCT VFR BLD CALC: 43 %
HGB BLD-MCNC: 13.7 G/DL
IMM GRANULOCYTES NFR BLD AUTO: 0.4 %
LYMPHOCYTES # BLD AUTO: 1.4 K/UL
LYMPHOCYTES NFR BLD AUTO: 29.4 %
MAN DIFF?: NORMAL
MCHC RBC-ENTMCNC: 30 PG
MCHC RBC-ENTMCNC: 31.9 GM/DL
MCV RBC AUTO: 94.3 FL
MONOCYTES # BLD AUTO: 0.49 K/UL
MONOCYTES NFR BLD AUTO: 10.3 %
NEUTROPHILS # BLD AUTO: 2.62 K/UL
NEUTROPHILS NFR BLD AUTO: 54.9 %
PLATELET # BLD AUTO: 273 K/UL
POTASSIUM SERPL-SCNC: 4.2 MMOL/L
PROT SERPL-MCNC: 6.9 G/DL
RBC # BLD: 4.56 M/UL
RBC # FLD: 12.6 %
SODIUM SERPL-SCNC: 136 MMOL/L
TSH SERPL-ACNC: 1.77 UIU/ML
WBC # FLD AUTO: 4.77 K/UL

## 2021-12-08 PROCEDURE — 93306 TTE W/DOPPLER COMPLETE: CPT

## 2021-12-09 ENCOUNTER — NON-APPOINTMENT (OUTPATIENT)
Age: 71
End: 2021-12-09

## 2021-12-09 PROBLEM — I31.9 PERICARDIAL DISORDER: Status: ACTIVE | Noted: 2021-12-02

## 2021-12-09 NOTE — PHYSICAL EXAM
[General Appearance - Well Developed] : well developed [Normal Appearance] : normal appearance [Well Groomed] : well groomed [General Appearance - Well Nourished] : well nourished [No Deformities] : no deformities [General Appearance - In No Acute Distress] : no acute distress [Normal Conjunctiva] : the conjunctiva exhibited no abnormalities [Eyelids - No Xanthelasma] : the eyelids demonstrated no xanthelasmas [Normal Oral Mucosa] : normal oral mucosa [No Oral Pallor] : no oral pallor [No Oral Cyanosis] : no oral cyanosis [Normal Jugular Venous A Waves Present] : normal jugular venous A waves present [Normal Jugular Venous V Waves Present] : normal jugular venous V waves present [No Jugular Venous Alonzo A Waves] : no jugular venous alonzo A waves [Respiration, Rhythm And Depth] : normal respiratory rhythm and effort [Exaggerated Use Of Accessory Muscles For Inspiration] : no accessory muscle use [Auscultation Breath Sounds / Voice Sounds] : lungs were clear to auscultation bilaterally [Heart Rate And Rhythm] : heart rate and rhythm were normal [Heart Sounds] : normal S1 and S2 [Murmurs] : no murmurs present [Abdomen Soft] : soft [Abdomen Tenderness] : non-tender [Abdomen Mass (___ Cm)] : no abdominal mass palpated [Abnormal Walk] : normal gait [Gait - Sufficient For Exercise Testing] : the gait was sufficient for exercise testing [Nail Clubbing] : no clubbing of the fingernails [Cyanosis, Localized] : no localized cyanosis [Petechial Hemorrhages (___cm)] : no petechial hemorrhages [Skin Color & Pigmentation] : normal skin color and pigmentation [] : no rash [No Venous Stasis] : no venous stasis [Skin Lesions] : no skin lesions [No Skin Ulcers] : no skin ulcer [No Xanthoma] : no  xanthoma was observed [Oriented To Time, Place, And Person] : oriented to person, place, and time [Affect] : the affect was normal [Mood] : the mood was normal [FreeTextEntry1] : mild appropriate anxiety

## 2021-12-09 NOTE — REASON FOR VISIT
[Abnormal Test Result] : an abnormal test result [FreeTextEntry3] : Dr. Dashawn Hendrix [FreeTextEntry1] : Shala was recently diagnosed with a stage one melanoma which was successfully excised. more recently she was noted to have a lung lesion and concern was raised regarding a possible metastatic focus from the melanoma. the primary origin of the lung could not be determined and she is seeing Dr. Escobar for a definitive biopsy. 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 specifically a photo penic right anterior pericardial focal fluid collection contiguous with the Periaortic pericardial recess was noted which may represent a pericardial diverticulum

## 2021-12-09 NOTE — ASSESSMENT
[FreeTextEntry1] : we discussed the possibility metastatic disease from the primary site to long and even to pericardium and that a biopsy of the lung lesion will be helpful in order to make this diagnosis melanoma is one of the few carcinomas that can actually metastasize to the pericardium and therefore further evaluation may be indicated. I've discussed the CAT scan and PET scan with radiology and there seems to be a low index of suspicion for metastases. that being said an MRI is planned in order to further clarify the lesion especially prior to upcoming surgery with possible plans for chemotherapy and surgical excision. Shala asked that I forward results to her brother Jose Hale at the [facility] [phone]. will comply. an echocardiogram fails to reveal evidence for significant pericardial effusion. the pericardial diverticulum was not noted. \par \par this represents a high risk encounter based upon new findings of the pericardial diverticulum which requires review of nuclear and CAT scans and discussion with consultants from radiology and nuclear medicine as well as surgery and hematology and family members\par \par 60 minutes\par \par EKG indicated for abnormal EKG

## 2021-12-21 ENCOUNTER — OUTPATIENT (OUTPATIENT)
Dept: OUTPATIENT SERVICES | Facility: HOSPITAL | Age: 71
LOS: 1 days | Discharge: HOME | End: 2021-12-21

## 2021-12-21 DIAGNOSIS — C43.9 MALIGNANT MELANOMA OF SKIN, UNSPECIFIED: ICD-10-CM

## 2021-12-21 DIAGNOSIS — Z98.890 OTHER SPECIFIED POSTPROCEDURAL STATES: Chronic | ICD-10-CM

## 2021-12-22 ENCOUNTER — APPOINTMENT (OUTPATIENT)
Dept: HEMATOLOGY ONCOLOGY | Facility: CLINIC | Age: 71
End: 2021-12-22
Payer: MEDICARE

## 2021-12-22 PROCEDURE — 99443: CPT

## 2021-12-22 NOTE — HISTORY OF PRESENT ILLNESS
[Home] : at home, [unfilled] , at the time of the visit. [Medical Office: ([facility])___] : at the medical office located in  [Verbal consent obtained from patient] : the patient, [unfilled] [Disease: _____________________] : Disease: [unfilled] [T: ___] : T[unfilled] [AJCC Stage: ____] : AJCC Stage: [unfilled] [Family Member] : family member [de-identified] : 2/2021-Patient presented to her dermatologist ([facility])-had full body scan, for screening evaluation-found suspicious lesion right side.\par Had biopsy of right flank lesion with pathology revealing melanoma measuring 0.32 mm.  No vascular or neuro invasion or ulceration.  She subsequently had excision of the area with pathology revealing no residual melanocytic proliferation.  Margins free (portion of skin that 4.7 x 2 x 0.8 cm).  Dermatologist recommended continued surveillance.\par \par 11/16/21-CT chest-3.7 cm superior right lower lobe part solid, cystic and groundglass lesion with 5 mm solid component on mediastinal windows, concerning for an adenocarcinoma spectrum lesion. Numerous other solid and groundglass nodules measuring up to 6 mm can be reassessed at that time.\par \par 11/22/21-PET/CT scan-part solid, cystic and groundglass lesions in superior segment right lower lobe, unchanged and better evaluated on CT from 11/15/2021, with background FDG avidity; this is indeterminate. Malignancy with low FDG avidity like adenocarcinoma in situ is not excluded. Additional scattered subcentimeter bilateral solid and groundglass nodules, too small to characterize are nonspecific. Fibroid uterus with no associated abnormal FDG activity.\par \par \par  [de-identified] : melanoma [de-identified] : Saw Dr. Escobar with lung surgery recommended, and scheduled 1/25/21.\par Brother Jose participated in history-he works for [employer]-in research. States he showed patient radiology images to a colleague at [facility].\par No c/o CP, SOB or increase cough. No GI/ or neurologic complaints. Has pulmonologist appt. scheduled as well as ETT and cardiac MRI-for pre-surgical evaluations.\par No abnormal bruising or bleeding reported.\par

## 2021-12-22 NOTE — ASSESSMENT
[FreeTextEntry1] : CT chest, PET/CT results, Dr. Escobar's 12/1/21 note, lab work reviewed.\par Stage T1a (1A) malignant melanoma–no vascular or neural invasion or ulceration on pathology.  Status post wide excision with no residual lesion.\par \par 11/2021-RLL nodule on CT,PET/CT imaging-plans are for resection (Dr. Escobar).\par Differential diagnosis of pulmonary abnormality reviewed with patient and her brother.  Rule out benign/possibly inflammatory versus neoplastic in nature.  Options discussed including short interval follow-up imaging and surgery as planned.  Patient still wishes to consider surgery but will have a CT scan of the chest prior to this.\par Oncology recommendations to be made pending course/surgical pathology results.\par \par Patient and her brother were given the opportunity to ask questions.  Their questions have been answered to their apparent satisfaction at this time.

## 2021-12-30 ENCOUNTER — APPOINTMENT (OUTPATIENT)
Dept: OBGYN | Facility: CLINIC | Age: 71
End: 2021-12-30

## 2022-01-03 ENCOUNTER — APPOINTMENT (OUTPATIENT)
Dept: PULMONOLOGY | Facility: CLINIC | Age: 72
End: 2022-01-03
Payer: MEDICARE

## 2022-01-03 VITALS
OXYGEN SATURATION: 99 % | SYSTOLIC BLOOD PRESSURE: 118 MMHG | DIASTOLIC BLOOD PRESSURE: 60 MMHG | HEIGHT: 63.5 IN | WEIGHT: 115 LBS | BODY MASS INDEX: 20.12 KG/M2 | TEMPERATURE: 97.6 F | HEART RATE: 60 BPM

## 2022-01-03 PROCEDURE — 94729 DIFFUSING CAPACITY: CPT

## 2022-01-03 PROCEDURE — 99204 OFFICE O/P NEW MOD 45 MIN: CPT | Mod: 25

## 2022-01-03 PROCEDURE — 94010 BREATHING CAPACITY TEST: CPT

## 2022-01-03 PROCEDURE — 94726 PLETHYSMOGRAPHY LUNG VOLUMES: CPT

## 2022-01-03 NOTE — DISCUSSION/SUMMARY
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 inflammatory vs. infectious. In nonsmoker with 3.7 cm lesion with negative PET, changce malignancy about 10%\par - agree w/ repeat CT scan prior to surgery\par - pt to inform pulmonary clinic after completion of CT scan\par - PFTs normal\par - if pt requires VATS surgery, she is currently optimized from pulmonary standpoint

## 2022-01-03 NOTE — HISTORY OF PRESENT ILLNESS
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 Had symptoms of jaw/neck pain and underwent CT neck in November with incidental RLL GGO. Dedicated CT chest 11/16/21 showing 3.7cm RLL semi-solid nodule numerous solid and GGO nodules bilaterally. Follow up PET/CT 11/22 w/ indeterminate FDG avidity. Evaluated by CT surgery in December with recommendation for diagnostic VATS w/ right superior segmentectomy. Presents now for pre-operative assessment prior to surgery. Unable to state ET in terms of blocks, though reports active lifestyle caring for cats and able to climb up hills and walk around without stopping for breathing. Does report maybe some decrease in exercise capacity over 1-2 years. Denies wheezing or hemoptysis. Occasional nonproductive mild cough as well as post nasal drip.  Reports raynauds in winter, some joint paints. 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

## 2022-01-03 NOTE — PHYSICAL EXAM
[No Acute Distress] : no acute distress [Normal Appearance] : normal appearance [Normal Rate/Rhythm] : normal rate/rhythm [Normal S1, S2] : normal s1, s2 [No Murmurs] : no murmurs [No Resp Distress] : no resp distress [Clear to Auscultation Bilaterally] : clear to auscultation bilaterally [Benign] : benign [No Edema] : no edema [Oriented x3] : oriented x3 [Normal Affect] : normal affect

## 2022-01-05 ENCOUNTER — APPOINTMENT (OUTPATIENT)
Dept: CARDIOLOGY | Facility: CLINIC | Age: 72
End: 2022-01-05
Payer: MEDICARE

## 2022-01-05 DIAGNOSIS — R94.31 ABNORMAL ELECTROCARDIOGRAM [ECG] [EKG]: ICD-10-CM

## 2022-01-05 DIAGNOSIS — Z01.818 ENCOUNTER FOR OTHER PREPROCEDURAL EXAMINATION: ICD-10-CM

## 2022-01-05 PROCEDURE — 99214 OFFICE O/P EST MOD 30 MIN: CPT | Mod: 25

## 2022-01-05 PROCEDURE — 93015 CV STRESS TEST SUPVJ I&R: CPT

## 2022-01-05 PROCEDURE — ZZZZZ: CPT

## 2022-01-06 ENCOUNTER — RX RENEWAL (OUTPATIENT)
Age: 72
End: 2022-01-06

## 2022-01-09 PROBLEM — Z01.818 PRE-OP TESTING: Status: ACTIVE | Noted: 2021-12-02

## 2022-01-09 NOTE — HISTORY OF PRESENT ILLNESS
[Preoperative Visit] : for a medical evaluation prior to surgery [Scheduled Procedure ___] : a [unfilled] [Date of Surgery ___] : on [unfilled] [Surgeon Name ___] : surgeon: [unfilled]

## 2022-01-09 NOTE — REASON FOR VISIT
[Symptom and Test Evaluation] : symptom and test evaluation [Abnormal Test Result] : an abnormal test result

## 2022-01-09 NOTE — PHYSICAL EXAM
[General Appearance - Well Developed] : well developed [Normal Appearance] : normal appearance [Well Groomed] : well groomed [General Appearance - Well Nourished] : well nourished [No Deformities] : no deformities [General Appearance - In No Acute Distress] : no acute distress [Normal Conjunctiva] : the conjunctiva exhibited no abnormalities [Eyelids - No Xanthelasma] : the eyelids demonstrated no xanthelasmas [Normal Oral Mucosa] : normal oral mucosa [No Oral Pallor] : no oral pallor [No Oral Cyanosis] : no oral cyanosis [Normal Jugular Venous A Waves Present] : normal jugular venous A waves present [Normal Jugular Venous V Waves Present] : normal jugular venous V waves present [No Jugular Venous Alonzo A Waves] : no jugular venous alonzo A waves [Respiration, Rhythm And Depth] : normal respiratory rhythm and effort [Exaggerated Use Of Accessory Muscles For Inspiration] : no accessory muscle use [Auscultation Breath Sounds / Voice Sounds] : lungs were clear to auscultation bilaterally [Heart Rate And Rhythm] : heart rate and rhythm were normal [Heart Sounds] : normal S1 and S2 [Murmurs] : no murmurs present [Abdomen Soft] : soft [Abdomen Tenderness] : non-tender [Abdomen Mass (___ Cm)] : no abdominal mass palpated [Abnormal Walk] : normal gait [Gait - Sufficient For Exercise Testing] : the gait was sufficient for exercise testing [Nail Clubbing] : no clubbing of the fingernails [Cyanosis, Localized] : no localized cyanosis [Petechial Hemorrhages (___cm)] : no petechial hemorrhages [Skin Color & Pigmentation] : normal skin color and pigmentation [] : no rash [No Venous Stasis] : no venous stasis [Skin Lesions] : no skin lesions [No Skin Ulcers] : no skin ulcer [No Xanthoma] : no  xanthoma was observed [Oriented To Time, Place, And Person] : oriented to person, place, and time [Affect] : the affect was normal [Mood] : the mood was normal

## 2022-01-11 NOTE — HISTORY OF PRESENT ILLNESS
[de-identified] : [place] [FreeTextEntry1] : Shala tells me that surgery is planned on 1/25. A CAT scan is plan one week prior. If the lesion is stable , then surgery may be postponed only to rescanned in 3 to 4 months. She notes cough however covid 19 testing was negative. she feels an occasional odd sensation under the rib cage over the past two years which is unexplained when she takes a deep breath.she  comes today for clarification of   Her   overall cardiovascular risk.\par she was advised to undergo a complete cardiac evaluation.she denies currentchest pains shortness of breath or loss of consciousnes.\par \par

## 2022-01-11 NOTE — REASON FOR VISIT
[FreeTextEntry1] : Shala tells me that surgery is planned on 125 she is saying Gordon a CAT scan is planned one week before Deondre feels that if the lesion is stable and surgery may be postponed in the rescan planned in 3 to 4 months Shala notes a cough COBIT was negative she feels occasional odd sensation under the rib cage over the past two years which is unexplained when she takes a deep breath

## 2022-01-12 ENCOUNTER — APPOINTMENT (OUTPATIENT)
Dept: PULMONOLOGY | Facility: CLINIC | Age: 72
End: 2022-01-12

## 2022-01-12 ENCOUNTER — OUTPATIENT (OUTPATIENT)
Dept: OUTPATIENT SERVICES | Facility: HOSPITAL | Age: 72
LOS: 1 days | End: 2022-01-12

## 2022-01-12 VITALS
TEMPERATURE: 97 F | WEIGHT: 115.08 LBS | SYSTOLIC BLOOD PRESSURE: 120 MMHG | OXYGEN SATURATION: 98 % | RESPIRATION RATE: 16 BRPM | DIASTOLIC BLOOD PRESSURE: 70 MMHG | HEART RATE: 68 BPM | HEIGHT: 64.5 IN

## 2022-01-12 DIAGNOSIS — R91.1 SOLITARY PULMONARY NODULE: ICD-10-CM

## 2022-01-12 DIAGNOSIS — Z98.890 OTHER SPECIFIED POSTPROCEDURAL STATES: Chronic | ICD-10-CM

## 2022-01-12 DIAGNOSIS — C43.9 MALIGNANT MELANOMA OF SKIN, UNSPECIFIED: Chronic | ICD-10-CM

## 2022-01-12 DIAGNOSIS — E03.9 HYPOTHYROIDISM, UNSPECIFIED: ICD-10-CM

## 2022-01-12 LAB
ALBUMIN SERPL ELPH-MCNC: 4.3 G/DL — SIGNIFICANT CHANGE UP (ref 3.3–5)
ALP SERPL-CCNC: 82 U/L — SIGNIFICANT CHANGE UP (ref 40–120)
ALT FLD-CCNC: 26 U/L — SIGNIFICANT CHANGE UP (ref 4–33)
ANION GAP SERPL CALC-SCNC: 13 MMOL/L — SIGNIFICANT CHANGE UP (ref 7–14)
AST SERPL-CCNC: 22 U/L — SIGNIFICANT CHANGE UP (ref 4–32)
BILIRUB SERPL-MCNC: 0.3 MG/DL — SIGNIFICANT CHANGE UP (ref 0.2–1.2)
BLD GP AB SCN SERPL QL: NEGATIVE — SIGNIFICANT CHANGE UP
BUN SERPL-MCNC: 18 MG/DL — SIGNIFICANT CHANGE UP (ref 7–23)
CALCIUM SERPL-MCNC: 9.7 MG/DL — SIGNIFICANT CHANGE UP (ref 8.4–10.5)
CHLORIDE SERPL-SCNC: 100 MMOL/L — SIGNIFICANT CHANGE UP (ref 98–107)
CO2 SERPL-SCNC: 24 MMOL/L — SIGNIFICANT CHANGE UP (ref 22–31)
CREAT SERPL-MCNC: 0.5 MG/DL — SIGNIFICANT CHANGE UP (ref 0.5–1.3)
GLUCOSE SERPL-MCNC: 104 MG/DL — HIGH (ref 70–99)
HCT VFR BLD CALC: 39.3 % — SIGNIFICANT CHANGE UP (ref 34.5–45)
HGB BLD-MCNC: 12.8 G/DL — SIGNIFICANT CHANGE UP (ref 11.5–15.5)
MCHC RBC-ENTMCNC: 29.7 PG — SIGNIFICANT CHANGE UP (ref 27–34)
MCHC RBC-ENTMCNC: 32.6 GM/DL — SIGNIFICANT CHANGE UP (ref 32–36)
MCV RBC AUTO: 91.2 FL — SIGNIFICANT CHANGE UP (ref 80–100)
NRBC # BLD: 0 /100 WBCS — SIGNIFICANT CHANGE UP
NRBC # FLD: 0 K/UL — SIGNIFICANT CHANGE UP
PLATELET # BLD AUTO: 271 K/UL — SIGNIFICANT CHANGE UP (ref 150–400)
POTASSIUM SERPL-MCNC: 3.9 MMOL/L — SIGNIFICANT CHANGE UP (ref 3.5–5.3)
POTASSIUM SERPL-SCNC: 3.9 MMOL/L — SIGNIFICANT CHANGE UP (ref 3.5–5.3)
PROT SERPL-MCNC: 7.1 G/DL — SIGNIFICANT CHANGE UP (ref 6–8.3)
RBC # BLD: 4.31 M/UL — SIGNIFICANT CHANGE UP (ref 3.8–5.2)
RBC # FLD: 12.4 % — SIGNIFICANT CHANGE UP (ref 10.3–14.5)
RH IG SCN BLD-IMP: POSITIVE — SIGNIFICANT CHANGE UP
SODIUM SERPL-SCNC: 137 MMOL/L — SIGNIFICANT CHANGE UP (ref 135–145)
WBC # BLD: 5.3 K/UL — SIGNIFICANT CHANGE UP (ref 3.8–10.5)
WBC # FLD AUTO: 5.3 K/UL — SIGNIFICANT CHANGE UP (ref 3.8–10.5)

## 2022-01-12 RX ORDER — ARMODAFINIL 200 MG/1
0 TABLET ORAL
Qty: 0 | Refills: 0 | DISCHARGE

## 2022-01-12 RX ORDER — LEVOTHYROXINE SODIUM 125 MCG
0 TABLET ORAL
Qty: 0 | Refills: 0 | DISCHARGE

## 2022-01-12 NOTE — H&P PST ADULT - CORNEAL ABRASION RISK
Detail Level: Detailed
Quality 137: Melanoma: Continuity Of Care - Recall System: Patient information entered into a recall system that includes: target date for the next exam specified AND a process to follow up with patients regarding missed or unscheduled appointments
When Should The Patient Follow-Up For Their Next Full-Body Skin Exam?: 1 Year
Detail Level: Zone
Detail Level: Generalized
with phonation

## 2022-01-12 NOTE — H&P PST ADULT - NSANTHBPHIGHRD_ENT_A_CORE
venlafaxine (EFFEXOR-XR) 37.5 MG 24 hr capsule       Last Written Prescription Date:  0  Last Fill Quantity: 0,   # refills: 0  Last Office Visit with G, P or [facility] prescribing provider: 5/17/2017  Future Office visit:       Routing refill request to provider for review/approval because:  Medication is reported/discontinued     Yes

## 2022-01-12 NOTE — H&P PST ADULT - HISTORY OF PRESENT ILLNESS
Pt is a 71 yr old female scheduled for Robotic Assisted Right Video Assisted Thoracoscopy Right Superior Segmentectomy with Dr Escobar tentatively 1/25/22 -   Pt instructed to contact surgeon's office concerning COVID test prior to surgery  Pt is a 71 yr old female scheduled for Robotic Assisted Right Video Assisted Thoracoscopy Right Superior Segmentectomy with Dr Escobar tentatively 1/25/22 - Pt hx malignant melanoma removed right flank 2/21 and CT noted RLL groundglass opacity. 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 Further CT scan noted solid and groundglass pulmonary nodules. Pt also noted to have poss pericardial diverticulum on CT scan 11/21 - pt to have Cardiac MRI tomorrow to further evaluate. Pt denies SOB or CP but admits to extreme fatigue for many years and diffuse joint / muscle pains that come and go. hx Hypothyroid and depression/anxiety   Pt instructed to contact surgeon's office concerning COVID test prior to surgery

## 2022-01-12 NOTE — H&P PST ADULT - NSICDXPASTMEDICALHX_GEN_ALL_CORE_FT
PAST MEDICAL HISTORY:  Anxiety     Cervical ca     Chronic fatigue syndrome     Depression     Hypothyroid     Lyme disease     Mononucleosis     Pulmonary nodule     Uterine fibroid     Vertigo      PAST MEDICAL HISTORY:  Anxiety     Cervical ca     Chronic fatigue syndrome     Depression     Hypothyroid     Lyme disease     Malignant melanoma     Mononucleosis     Pulmonary nodule     Uterine fibroid     Vertigo

## 2022-01-12 NOTE — H&P PST ADULT - NSSUBSTANCEUSE_GEN_ALL_CORE_SD
Eyes:  EOM    [x]  Normal  [] Abnormal-  Sclera  [x]  Normal  [] Abnormal -         Discharge [x]  None visible  [] Abnormal -    HENT:   [x] Normocephalic, atraumatic. [] Abnormal   [] Mouth/Throat: Mucous membranes are moist.     External Ears [x] Normal  [] Abnormal-     Neck: [x] No visualized mass     Pulmonary/Chest: [x] Respiratory effort normal.  [x] No visualized signs of difficulty breathing or respiratory distress        [] Abnormal-      Musculoskeletal:   [] Normal gait with no signs of ataxia         [x] Normal range of motion of neck        [] Abnormal-       Neurological:        [x] No Facial Asymmetry (Cranial nerve 7 motor function) (limited exam to video visit)          [x] No gaze palsy        [] Abnormal-         Skin:        [x] No significant exanthematous lesions or discoloration noted on facial skin         [] Abnormal-            Psychiatric:       [x] Normal Affect [] No Hallucinations        [] Abnormal-     Other pertinent observable physical exam findings-     Lab Review   Orders Only on 04/25/2020   Component Date Value    Vitamin B-12 04/25/2020 386     Vit D, 25-Hydroxy 04/25/2020 24.4*    Hemoglobin A1C 04/25/2020 7.1     eAG 04/25/2020 157.1     Sodium 04/25/2020 144     Potassium 04/25/2020 4.9     Chloride 04/25/2020 104     CO2 04/25/2020 25     Anion Gap 04/25/2020 15     Glucose 04/25/2020 81     BUN 04/25/2020 18     CREATININE 04/25/2020 0.9     GFR Non- 04/25/2020 >60     GFR  04/25/2020 >60     Calcium 04/25/2020 9.3     Total Protein 04/25/2020 6.9     Alb 04/25/2020 3.9     Albumin/Globulin Ratio 04/25/2020 1.3     Total Bilirubin 04/25/2020 0.4     Alkaline Phosphatase 04/25/2020 49     ALT 04/25/2020 10     AST 04/25/2020 18     Globulin 04/25/2020 3.0        ASSESSMENT/PLAN:  1.  Type 2 diabetes mellitus without complication, without long-term current use of insulin (HCC)  -Continue same
caffeine

## 2022-01-12 NOTE — H&P PST ADULT - NSICDXPASTSURGICALHX_GEN_ALL_CORE_FT
PAST SURGICAL HISTORY:  H/O cone biopsy of cervix     History of thoracotomy     Malignant melanoma 2021 - removed right back

## 2022-01-12 NOTE — H&P PST ADULT - PROBLEM SELECTOR PLAN 1
Pt scheduled for surgery Robotic Assisted Right Video Assisted Thoracoscopy Right Superior Segmentectomy with Dr Escobar tentatively 1/25/22 and preop instructions including instructions for taking Famotidine and for Chlorhexidine use in showering on the day of surgery, given verbally and with use of  written materials, and patient confirming understanding of such instructions using  teach back method.  Request CC from Cardio - updated clearance to follow Cardiac MRI   Pulmonary note in chart with PFTs

## 2022-01-13 ENCOUNTER — APPOINTMENT (OUTPATIENT)
Dept: MRI IMAGING | Facility: CLINIC | Age: 72
End: 2022-01-13
Payer: MEDICARE

## 2022-01-13 ENCOUNTER — OUTPATIENT (OUTPATIENT)
Dept: OUTPATIENT SERVICES | Facility: HOSPITAL | Age: 72
LOS: 1 days | End: 2022-01-13
Payer: MEDICARE

## 2022-01-13 DIAGNOSIS — Z00.8 ENCOUNTER FOR OTHER GENERAL EXAMINATION: ICD-10-CM

## 2022-01-13 DIAGNOSIS — Z98.890 OTHER SPECIFIED POSTPROCEDURAL STATES: Chronic | ICD-10-CM

## 2022-01-13 DIAGNOSIS — I31.9 DISEASE OF PERICARDIUM, UNSPECIFIED: ICD-10-CM

## 2022-01-13 DIAGNOSIS — C43.9 MALIGNANT MELANOMA OF SKIN, UNSPECIFIED: Chronic | ICD-10-CM

## 2022-01-13 PROCEDURE — 75561 CARDIAC MRI FOR MORPH W/DYE: CPT | Mod: 26

## 2022-01-13 PROCEDURE — A9585: CPT

## 2022-01-13 PROCEDURE — 75561 CARDIAC MRI FOR MORPH W/DYE: CPT

## 2022-01-17 ENCOUNTER — OUTPATIENT (OUTPATIENT)
Dept: OUTPATIENT SERVICES | Facility: HOSPITAL | Age: 72
LOS: 1 days | End: 2022-01-17
Payer: MEDICARE

## 2022-01-17 ENCOUNTER — APPOINTMENT (OUTPATIENT)
Dept: CT IMAGING | Facility: HOSPITAL | Age: 72
End: 2022-01-17
Payer: MEDICARE

## 2022-01-17 DIAGNOSIS — Z98.890 OTHER SPECIFIED POSTPROCEDURAL STATES: Chronic | ICD-10-CM

## 2022-01-17 DIAGNOSIS — C43.9 MALIGNANT MELANOMA OF SKIN, UNSPECIFIED: ICD-10-CM

## 2022-01-17 DIAGNOSIS — C43.9 MALIGNANT MELANOMA OF SKIN, UNSPECIFIED: Chronic | ICD-10-CM

## 2022-01-17 DIAGNOSIS — R91.1 SOLITARY PULMONARY NODULE: ICD-10-CM

## 2022-01-17 PROBLEM — C53.9 MALIGNANT NEOPLASM OF CERVIX UTERI, UNSPECIFIED: Chronic | Status: ACTIVE | Noted: 2022-01-12

## 2022-01-17 PROBLEM — D25.9 LEIOMYOMA OF UTERUS, UNSPECIFIED: Chronic | Status: ACTIVE | Noted: 2022-01-12

## 2022-01-17 PROCEDURE — 71250 CT THORAX DX C-: CPT

## 2022-01-17 PROCEDURE — 71250 CT THORAX DX C-: CPT | Mod: 26

## 2022-01-19 ENCOUNTER — NON-APPOINTMENT (OUTPATIENT)
Age: 72
End: 2022-01-19

## 2022-01-21 ENCOUNTER — NON-APPOINTMENT (OUTPATIENT)
Age: 72
End: 2022-01-21

## 2022-01-25 ENCOUNTER — APPOINTMENT (OUTPATIENT)
Dept: THORACIC SURGERY | Facility: HOSPITAL | Age: 72
End: 2022-01-25

## 2022-01-26 ENCOUNTER — APPOINTMENT (OUTPATIENT)
Dept: THORACIC SURGERY | Facility: CLINIC | Age: 72
End: 2022-01-26
Payer: MEDICARE

## 2022-01-26 PROCEDURE — 99443: CPT

## 2022-01-27 NOTE — HISTORY OF PRESENT ILLNESS
[Home] : at home, [unfilled] , at the time of the visit. [Medical Office: ([facility])___] : at the medical office located in  [Verbal consent obtained from patient] : the patient, [unfilled] [FreeTextEntry1] : Ms. MANASA MCRAE, 71 year old female, never a smoker (+ second hand smoke), w/ hx of recently diagnosed Stage IA malignant melanoma.  s/p wide excision of flank on 2/15/21, p/w neck pain s/p CT Neck which revealed RLL groundglass opacity. 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 Further CT Chest reveals other solid and groundglass pulmonary nodules. \par \par CT Chest w/IVC on 11/15/21:\par - 3.7 cm superior right lower lobe part solid, cystic and groundglass lesion with 5 mm solid component on mediastinal windows (2:183), concerning for an adenocarcinoma spectrum lesion. The lesion tethers the right major fissure and adjacent mediastinal pleura.\par - Numerous other solid and groundglass nodules measuring up to 6 mm RUL (image 142) \par - Other several scattered small groundglass and solid nodules including right lower lobe series 3 image 78, left upper lobe image 35 and left lower lobe image 127. 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 \par - The adjacent 6 mm groundglass nodule just superiorly (image 78) and additional scattered small groundglass and solid nodules, for example 3 mm nodules in the right lower lobe (image 102) and left upper lobe (image 73) are too small to characterize.\par - Fibroid uterus with no associated abnormal FDG activity.\par \par CT chest on 1/17/22:\par - Stable right lower lobe groundglass lesion with cystic and solid components, suspicious for a primary lung neoplasm.\par \par Patient was scheduled for Robotic Assisted Right VATS Right Superior Segmentectomy on 1/25/22. 1/21/22 she called to cancel procedure.Preferring surveillance for now. She returns today via telehealth to discuss next steps.

## 2022-01-27 NOTE — CONSULT LETTER
[Dear  ___] : Dear  [unfilled], [Courtesy Letter:] : I had the pleasure of seeing your patient, [unfilled], in my office today. [Please see my note below.] : Please see my note below. [Sincerely,] : Sincerely, [FreeTextEntry2] : Dr. Hendrix ([facility])/ref [FreeTextEntry3] : Ross Escobar MD, FACS \par , Thoracic Surgery, [facility]\par Chief of Division of Thoracic Surgery, [facility]\par Department of Cardiovascular & Thoracic Surgery \par , [employer] [facility] [employer] [facility]\par

## 2022-01-27 NOTE — ASSESSMENT
[FreeTextEntry1] : Ms. MANASA MCRAE, 71 year old female, never a smoker (+ second hand smoke), w/ hx of recently diagnosed Stage IA malignant melanoma.  s/p wide excision of flank on 2/15/21, p/w neck pain s/p CT Neck which revealed RLL groundglass opacity. 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 Further CT Chest reveals other solid and groundglass pulmonary nodules. \par \par I have independently reviewed the medical records and imaging at the time of this office consultation. RLL 3.7 cm GGO with solid central component nodule highly suspicious for adenocarcinoma.  Discussed extensively with patient that in order to fully rule out malignancy, a surgical biopsy is advised. Discussed risk of possible disease progression without definitive diagnosis and further treatment. Patient is agreeable to surgical intervention, and will be rebooked for  Robotic Assisted Right VATS Right Superior Segmentectomy. \par \par Recommendations reviewed with patient during this office visit, and all questions answered; Patient instructed on the importance of follow up and verbalizes understanding.\par \par I personally performed the services described in the documentation, reviewed the documentation recorded by the scribe in my presence and it accurately and completely records my words and actions.\par \par I, LAWRENCE Rios-C, am scribing for and the presence of VINAY Chaudhry, the following sections HISTORY OF PRESENT ILLNESS, PAST MEDICAL/FAMILY/SOCIAL HISTORY; REVIEW OF SYSTEMS; VITAL SIGNS; PHYSICAL EXAM; DISPOSITION.\par \par \par

## 2022-01-28 ENCOUNTER — NON-APPOINTMENT (OUTPATIENT)
Age: 72
End: 2022-01-28

## 2022-02-16 ENCOUNTER — NON-APPOINTMENT (OUTPATIENT)
Age: 72
End: 2022-02-16

## 2022-02-18 ENCOUNTER — OUTPATIENT (OUTPATIENT)
Dept: OUTPATIENT SERVICES | Facility: HOSPITAL | Age: 72
LOS: 1 days | End: 2022-02-18
Payer: MEDICARE

## 2022-02-18 VITALS
TEMPERATURE: 98 F | RESPIRATION RATE: 16 BRPM | DIASTOLIC BLOOD PRESSURE: 77 MMHG | HEART RATE: 72 BPM | SYSTOLIC BLOOD PRESSURE: 126 MMHG | OXYGEN SATURATION: 97 % | HEIGHT: 63.75 IN | WEIGHT: 121.92 LBS

## 2022-02-18 DIAGNOSIS — Z98.890 OTHER SPECIFIED POSTPROCEDURAL STATES: Chronic | ICD-10-CM

## 2022-02-18 DIAGNOSIS — E03.9 HYPOTHYROIDISM, UNSPECIFIED: ICD-10-CM

## 2022-02-18 DIAGNOSIS — R91.1 SOLITARY PULMONARY NODULE: ICD-10-CM

## 2022-02-18 DIAGNOSIS — C43.9 MALIGNANT MELANOMA OF SKIN, UNSPECIFIED: Chronic | ICD-10-CM

## 2022-02-18 LAB
BLD GP AB SCN SERPL QL: NEGATIVE — SIGNIFICANT CHANGE UP
RH IG SCN BLD-IMP: POSITIVE — SIGNIFICANT CHANGE UP

## 2022-02-18 PROCEDURE — 93010 ELECTROCARDIOGRAM REPORT: CPT

## 2022-02-18 RX ORDER — SODIUM CHLORIDE 9 MG/ML
1000 INJECTION, SOLUTION INTRAVENOUS
Refills: 0 | Status: DISCONTINUED | OUTPATIENT
Start: 2022-03-07 | End: 2022-03-09

## 2022-02-18 NOTE — H&P PST ADULT - PROBLEM SELECTOR PLAN 1
Pt scheduled for surgery Robotic Assisted Right Video Assisted Thoracoscopy Right Superior Segmentectomy with Dr Escobar tentatively 3/7/22 and preop instructions including instructions for taking Famotidine and for Chlorhexidine use in showering on the day of surgery, given verbally and with use of  written materials, and patient confirming understanding of such instructions using  teach back method.  CC requested from Dr Rubi - Echo

## 2022-02-18 NOTE — H&P PST ADULT - HISTORY OF PRESENT ILLNESS
Pt is a 71 yr old female scheduled for Robotic Assisted Right Video Assisted Thoracoscopy Right Superior Segmentectomy with Dr Escobar tentatively 1/25/22 - Pt hx malignant melanoma removed right flank 2/21 and CT noted RLL groundglass opacity. 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 Further CT scan noted solid and groundglass pulmonary nodules. Pt also noted to have poss pericardial diverticulum on CT scan 11/21 - pt to have Cardiac MRI tomorrow to further evaluate. Pt denies SOB or CP but admits to extreme fatigue for many years and diffuse joint / muscle pains that come and go. hx Hypothyroid and depression/anxiety   Pt instructed to contact surgeon's office concerning COVID test prior to surgery  Pt is a 71 yr old female scheduled for Robotic Assisted Right Video Assisted Thoracoscopy Right Superior Segmentectomy with Dr Escobar tentatively 3/7/22  - Pt hx malignant melanoma removed right flank 2/21 and CT noted RLL groundglass opacity. 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 Further CT scan noted solid and groundglass pulmonary nodules. Pt also noted to have poss pericardial diverticulum on CT scan 11/21 - pt to have Cardiac MRI tomorrow to further evaluate. Pt denies SOB or CP but admits to extreme fatigue for many years and diffuse joint / muscle pains that come and go. hx Hypothyroid and depression/anxiety - pt cancelled surgery scheduled 1/25/22 because of extreme anxiety   Pt instructed to contact surgeon's office concerning COVID test prior to surgery  Pt is a 71 yr old female scheduled for Robotic Assisted Right Video Assisted Thoracoscopy Right Superior Segmentectomy with Dr Escobar tentatively 3/7/22  - Pt hx malignant melanoma removed right flank 2/21 and CT noted RLL ground glass opacity. 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 Further CT scan noted solid and ground glass pulmonary nodules. Pt also noted to have poss pericardial diverticulum on CT scan 11/21 - pt to have Cardiac MRI tomorrow to further evaluate. Pt denies SOB or CP but admits to extreme fatigue for many years and diffuse joint / muscle pains that come and go. hx Hypothyroid and depression/anxiety - pt cancelled surgery scheduled 1/25/22 because of extreme anxiety     Pt instructed to contact surgeon's office concerning COVID test prior to surgery

## 2022-02-18 NOTE — H&P PST ADULT - NSICDXPASTMEDICALHX_GEN_ALL_CORE_FT
PAST MEDICAL HISTORY:  Anxiety     Cervical ca     Chronic fatigue syndrome     Depression     Hypothyroid     Lyme disease     Malignant melanoma     Mononucleosis     Pulmonary nodule     Uterine fibroid     Vertigo

## 2022-02-20 ENCOUNTER — TRANSCRIPTION ENCOUNTER (OUTPATIENT)
Age: 72
End: 2022-02-20

## 2022-03-04 ENCOUNTER — APPOINTMENT (OUTPATIENT)
Dept: CARDIOLOGY | Facility: CLINIC | Age: 72
End: 2022-03-04
Payer: MEDICARE

## 2022-03-04 PROCEDURE — 99442: CPT

## 2022-03-04 NOTE — ASU PATIENT PROFILE, ADULT - FALL HARM RISK - UNIVERSAL INTERVENTIONS
Bed in lowest position, wheels locked, appropriate side rails in place/Call bell, personal items and telephone in reach/Instruct patient to call for assistance before getting out of bed or chair/Non-slip footwear when patient is out of bed/[place] to call system/Physically safe environment - no spills, clutter or unnecessary equipment/Purposeful Proactive Rounding/Room/bathroom lighting operational, light cord in reach

## 2022-03-06 LAB — SARS-COV-2 N GENE NPH QL NAA+PROBE: NOT DETECTED

## 2022-03-07 ENCOUNTER — APPOINTMENT (OUTPATIENT)
Dept: THORACIC SURGERY | Facility: HOSPITAL | Age: 72
End: 2022-03-07

## 2022-03-07 ENCOUNTER — RESULT REVIEW (OUTPATIENT)
Age: 72
End: 2022-03-07

## 2022-03-07 ENCOUNTER — INPATIENT (INPATIENT)
Facility: HOSPITAL | Age: 72
LOS: 8 days | Discharge: HOME | End: 2022-03-16
Attending: THORACIC SURGERY (CARDIOTHORACIC VASCULAR SURGERY) | Admitting: THORACIC SURGERY (CARDIOTHORACIC VASCULAR SURGERY)
Payer: MEDICARE

## 2022-03-07 VITALS
SYSTOLIC BLOOD PRESSURE: 113 MMHG | WEIGHT: 121.92 LBS | DIASTOLIC BLOOD PRESSURE: 63 MMHG | OXYGEN SATURATION: 99 % | TEMPERATURE: 99 F | RESPIRATION RATE: 16 BRPM | HEART RATE: 59 BPM | HEIGHT: 63.75 IN

## 2022-03-07 DIAGNOSIS — C43.9 MALIGNANT MELANOMA OF SKIN, UNSPECIFIED: Chronic | ICD-10-CM

## 2022-03-07 DIAGNOSIS — Z98.890 OTHER SPECIFIED POSTPROCEDURAL STATES: Chronic | ICD-10-CM

## 2022-03-07 DIAGNOSIS — R91.1 SOLITARY PULMONARY NODULE: ICD-10-CM

## 2022-03-07 PROCEDURE — 99233 SBSQ HOSP IP/OBS HIGH 50: CPT

## 2022-03-07 PROCEDURE — 32674 THORACOSCOPY LYMPH NODE EXC: CPT

## 2022-03-07 PROCEDURE — 32669 THORACOSCOPY REMOVE SEGMENT: CPT

## 2022-03-07 PROCEDURE — 71045 X-RAY EXAM CHEST 1 VIEW: CPT | Mod: 26

## 2022-03-07 PROCEDURE — 31622 DX BRONCHOSCOPE/WASH: CPT

## 2022-03-07 PROCEDURE — 88305 TISSUE EXAM BY PATHOLOGIST: CPT | Mod: 26

## 2022-03-07 PROCEDURE — 88309 TISSUE EXAM BY PATHOLOGIST: CPT | Mod: 26

## 2022-03-07 PROCEDURE — S2900 ROBOTIC SURGICAL SYSTEM: CPT | Mod: NC

## 2022-03-07 RX ORDER — NALOXONE HYDROCHLORIDE 4 MG/.1ML
0.1 SPRAY NASAL
Refills: 0 | Status: DISCONTINUED | OUTPATIENT
Start: 2022-03-07 | End: 2022-03-16

## 2022-03-07 RX ORDER — PANTOPRAZOLE SODIUM 20 MG/1
40 TABLET, DELAYED RELEASE ORAL
Refills: 0 | Status: DISCONTINUED | OUTPATIENT
Start: 2022-03-07 | End: 2022-03-16

## 2022-03-07 RX ORDER — POLYETHYLENE GLYCOL 3350 17 G/17G
17 POWDER, FOR SOLUTION ORAL DAILY
Refills: 0 | Status: DISCONTINUED | OUTPATIENT
Start: 2022-03-07 | End: 2022-03-16

## 2022-03-07 RX ORDER — HYDROMORPHONE HYDROCHLORIDE 2 MG/ML
0.5 INJECTION INTRAMUSCULAR; INTRAVENOUS; SUBCUTANEOUS
Refills: 0 | Status: DISCONTINUED | OUTPATIENT
Start: 2022-03-07 | End: 2022-03-08

## 2022-03-07 RX ORDER — ALBUMIN HUMAN 25 %
250 VIAL (ML) INTRAVENOUS ONCE
Refills: 0 | Status: COMPLETED | OUTPATIENT
Start: 2022-03-07 | End: 2022-03-07

## 2022-03-07 RX ORDER — ACETAMINOPHEN 500 MG
825 TABLET ORAL ONCE
Refills: 0 | Status: DISCONTINUED | OUTPATIENT
Start: 2022-03-07 | End: 2022-03-08

## 2022-03-07 RX ORDER — INFLUENZA VIRUS VACCINE 15; 15; 15; 15 UG/.5ML; UG/.5ML; UG/.5ML; UG/.5ML
0.7 SUSPENSION INTRAMUSCULAR ONCE
Refills: 0 | Status: DISCONTINUED | OUTPATIENT
Start: 2022-03-07 | End: 2022-03-07

## 2022-03-07 RX ORDER — HEPARIN SODIUM 5000 [USP'U]/ML
5000 INJECTION INTRAVENOUS; SUBCUTANEOUS EVERY 12 HOURS
Refills: 0 | Status: DISCONTINUED | OUTPATIENT
Start: 2022-03-07 | End: 2022-03-07

## 2022-03-07 RX ORDER — ACETAMINOPHEN 500 MG
825 TABLET ORAL ONCE
Refills: 0 | Status: COMPLETED | OUTPATIENT
Start: 2022-03-07 | End: 2022-03-07

## 2022-03-07 RX ORDER — LIDOCAINE 4 G/100G
1 CREAM TOPICAL DAILY
Refills: 0 | Status: DISCONTINUED | OUTPATIENT
Start: 2022-03-07 | End: 2022-03-16

## 2022-03-07 RX ORDER — ACETAMINOPHEN 500 MG
975 TABLET ORAL ONCE
Refills: 0 | Status: COMPLETED | OUTPATIENT
Start: 2022-03-07 | End: 2022-03-07

## 2022-03-07 RX ORDER — GABAPENTIN 400 MG/1
300 CAPSULE ORAL ONCE
Refills: 0 | Status: COMPLETED | OUTPATIENT
Start: 2022-03-07 | End: 2022-03-07

## 2022-03-07 RX ORDER — KETOROLAC TROMETHAMINE 30 MG/ML
15 SYRINGE (ML) INJECTION EVERY 8 HOURS
Refills: 0 | Status: DISCONTINUED | OUTPATIENT
Start: 2022-03-07 | End: 2022-03-09

## 2022-03-07 RX ORDER — HEPARIN SODIUM 5000 [USP'U]/ML
5000 INJECTION INTRAVENOUS; SUBCUTANEOUS ONCE
Refills: 0 | Status: COMPLETED | OUTPATIENT
Start: 2022-03-07 | End: 2022-03-07

## 2022-03-07 RX ORDER — HEPARIN SODIUM 5000 [USP'U]/ML
5000 INJECTION INTRAVENOUS; SUBCUTANEOUS EVERY 12 HOURS
Refills: 0 | Status: DISCONTINUED | OUTPATIENT
Start: 2022-03-07 | End: 2022-03-16

## 2022-03-07 RX ORDER — LEVOTHYROXINE SODIUM 125 MCG
75 TABLET ORAL DAILY
Refills: 0 | Status: DISCONTINUED | OUTPATIENT
Start: 2022-03-07 | End: 2022-03-16

## 2022-03-07 RX ORDER — METOCLOPRAMIDE HCL 10 MG
10 TABLET ORAL EVERY 8 HOURS
Refills: 0 | Status: DISCONTINUED | OUTPATIENT
Start: 2022-03-07 | End: 2022-03-16

## 2022-03-07 RX ORDER — ACETAMINOPHEN 500 MG
650 TABLET ORAL EVERY 6 HOURS
Refills: 0 | Status: DISCONTINUED | OUTPATIENT
Start: 2022-03-07 | End: 2022-03-08

## 2022-03-07 RX ORDER — SENNA PLUS 8.6 MG/1
2 TABLET ORAL AT BEDTIME
Refills: 0 | Status: DISCONTINUED | OUTPATIENT
Start: 2022-03-07 | End: 2022-03-16

## 2022-03-07 RX ORDER — HYDROMORPHONE HYDROCHLORIDE 2 MG/ML
30 INJECTION INTRAMUSCULAR; INTRAVENOUS; SUBCUTANEOUS
Refills: 0 | Status: DISCONTINUED | OUTPATIENT
Start: 2022-03-07 | End: 2022-03-08

## 2022-03-07 RX ORDER — KETOROLAC TROMETHAMINE 30 MG/ML
15 SYRINGE (ML) INJECTION ONCE
Refills: 0 | Status: DISCONTINUED | OUTPATIENT
Start: 2022-03-07 | End: 2022-03-07

## 2022-03-07 RX ORDER — ONDANSETRON 8 MG/1
4 TABLET, FILM COATED ORAL EVERY 6 HOURS
Refills: 0 | Status: DISCONTINUED | OUTPATIENT
Start: 2022-03-07 | End: 2022-03-14

## 2022-03-07 RX ADMIN — Medication 10 MILLIGRAM(S): at 21:21

## 2022-03-07 RX ADMIN — HEPARIN SODIUM 5000 UNIT(S): 5000 INJECTION INTRAVENOUS; SUBCUTANEOUS at 17:56

## 2022-03-07 RX ADMIN — Medication 825 MILLIGRAM(S): at 20:46

## 2022-03-07 RX ADMIN — GABAPENTIN 300 MILLIGRAM(S): 400 CAPSULE ORAL at 06:26

## 2022-03-07 RX ADMIN — Medication 125 MILLILITER(S): at 18:28

## 2022-03-07 RX ADMIN — Medication 15 MILLIGRAM(S): at 14:33

## 2022-03-07 RX ADMIN — HYDROMORPHONE HYDROCHLORIDE 30 MILLILITER(S): 2 INJECTION INTRAMUSCULAR; INTRAVENOUS; SUBCUTANEOUS at 19:06

## 2022-03-07 RX ADMIN — Medication 330 MILLIGRAM(S): at 20:21

## 2022-03-07 RX ADMIN — Medication 975 MILLIGRAM(S): at 06:25

## 2022-03-07 RX ADMIN — Medication 15 MILLIGRAM(S): at 11:48

## 2022-03-07 RX ADMIN — HEPARIN SODIUM 5000 UNIT(S): 5000 INJECTION INTRAVENOUS; SUBCUTANEOUS at 06:38

## 2022-03-07 RX ADMIN — Medication 125 MILLILITER(S): at 22:52

## 2022-03-07 RX ADMIN — Medication 125 MILLILITER(S): at 20:26

## 2022-03-07 RX ADMIN — ONDANSETRON 4 MILLIGRAM(S): 8 TABLET, FILM COATED ORAL at 20:21

## 2022-03-07 RX ADMIN — ONDANSETRON 4 MILLIGRAM(S): 8 TABLET, FILM COATED ORAL at 14:00

## 2022-03-07 NOTE — PROGRESS NOTE ADULT - SUBJECTIVE AND OBJECTIVE BOX
CHIEF COMPLAINT: FOLLOW UP IN ICU FOR POSTOPERATIVE CARE OF PATIENT WHO IS S/P LUNG RESECTION      PROCEDURES: right robot-assisted VATS superior segmentectomy with MLND (level 7, 8, 9, 10, 11) 07-Mar-2022     ISSUES:   Lung nodule  Post op pain  Chest tube in place  S/P Lobectomy of L lung  Hypothyroidism  Hx of melanoma s/p resection  Hx of cervical cancer   Anxiety  Depression      INTERVAL EVENTS:   OR today. Extubated in OR. Transferred to CTICU.      HISTORY:   Patient reports moderate pain at chest wall incision sites which is worse with coughing and deep breathing without associated fever or dyspnea. Pain is improved with use of pain meds.     PHYSICAL EXAM:   Gen: Comfortable, No acute distress  Eyes: Sclera white, Conjunctiva normal, Eyelids normal, Pupils symmetrical   ENT: Mucous membranes moist,  ,  ,    Neck: Trachea midline,  ,  ,  ,  ,  ,    CV: Rate regular, Rhythm regular,  ,  ,    Resp: Breath sounds clear, No accessory muscles use, R chest tube in place,  ,    Abd: Soft, Non-distended, Non-tender, Bowel sounds normal,  ,  ,    Skin: Warm, No peripheral edema of lower extremities,  ,    : Butler in place  Neuro: Moving all 4 extremities,    Psych: A&Ox3      ASSESSMENT AND PLAN:     NEURO:  Post-operative Pain - Stable. Pain control with PCA and Tylenol IV PRN.          RESPIRATORY:  Hypoxia - Wean nasal cannula for goal O2sat above 92. Obtain CXR. Incentive spirometry. Chest PT and frequent suctioning. Continue bronchodilators. OOB to chair & ambulate w/ assistance. Continuous pulse oximetry for support & to prevent decompensation.       Chest tube – Pleurevac regulated suctioning. Monitor chest tube output.    CARDIOVASCULAR:  Hemodynamically stable - Not on pressors. Continue hemodynamic monitoring.  Telemetry (medical test) - Reviewed by me today independently. Normal sinus rhythm.             RENAL:  Stable - Monitor IOs and electrolytes. Keep K above 4.0 and Mg above 2.0.        GASTROINTESTINAL:  GI prophylaxis not indicated  Zofran and Reglan IV PRN for nausea  Regular consistency diet        HEMATOLOGIC:  No signs of active bleeding. Monitor Hgb in CBC in AM  DVT prophylaxis with heparin subQ and SCDs.       INFECTIOUS DISEASE:  All surgical sites appear clean. No signs of active infection. Will monitor for fever and leukocytosis.          ENDOCRINE:  Stable – Monitor glucose fingersticks for goal 120-180.  Hypothyroidism - stable. Continue Synthroid.            ONCOLOGY:  Lung nodule - Improved. S/P resection. Follow up final pathology.      Pertinent clinical, laboratory, radiographic, hemodynamic, echocardiographic, respiratory data, microbiologic data and chart were reviewed by myself and analyzed frequently throughout the course of the day and night by myself.    Plan discussed at length with the CTICU staff and Attending CT Surgeon -   Dr Ross Escobar.      Patient's status was discussed with patient at bedside.     	      ________________________________________________      _________________________  VITAL SIGNS:  Vital Signs Last 24 Hrs  T(C): 36.2 (07 Mar 2022 11:00), Max: 37.1 (07 Mar 2022 05:52)  T(F): 97.1 (07 Mar 2022 11:00), Max: 98.8 (07 Mar 2022 05:52)  HR: 59 (07 Mar 2022 11:35) (59 - 63)  BP: 128/56 (07 Mar 2022 11:35) (113/63 - 147/64)  BP(mean): 74 (07 Mar 2022 11:35) (74 - 85)  RR: 13 (07 Mar 2022 11:35) (12 - 20)  SpO2: 99% (07 Mar 2022 11:35) (98% - 100%)  I/Os:   I&O's Detail    07 Mar 2022 07:01  -  07 Mar 2022 12:36  --------------------------------------------------------  IN:    Lactated Ringers: 30 mL  Total IN: 30 mL    OUT:    Chest Tube (mL): 0 mL    Indwelling Catheter - Urethral (mL): 150 mL  Total OUT: 150 mL    Total NET: -120 mL              MEDICATIONS:  MEDICATIONS  (STANDING):  acetaminophen   IVPB .. 825 milliGRAM(s) IV Intermittent once  heparin   Injectable 5000 Unit(s) SubCutaneous every 12 hours  HYDROmorphone PCA (1 mG/mL) 30 milliLiter(s) PCA Continuous PCA Continuous  lactated ringers. 1000 milliLiter(s) (30 mL/Hr) IV Continuous <Continuous>  levothyroxine 75 MICROGram(s) Oral daily  lidocaine   4% Patch 1 Patch Transdermal daily  pantoprazole    Tablet 40 milliGRAM(s) Oral before breakfast  polyethylene glycol 3350 17 Gram(s) Oral daily  senna 2 Tablet(s) Oral at bedtime    MEDICATIONS  (PRN):  HYDROmorphone PCA (1 mG/mL) Rescue Clinician Bolus 0.5 milliGRAM(s) IV Push every 15 minutes PRN for Pain Scale GREATER THAN 6  naloxone Injectable 0.1 milliGRAM(s) IV Push every 3 minutes PRN For ANY of the following changes in patient status:  A. RR LESS THAN 10 breaths per minute, B. Oxygen saturation LESS THAN 90%, C. Sedation score of 6  ondansetron Injectable 4 milliGRAM(s) IV Push every 6 hours PRN Nausea      LABS:  Pre operative Laboratory data was independently reviewed by me today.   1/12/22 Hgb 12.8 and Creatinine 0.5.      RADIOLOGY:   Radiology images were independently reviewed by me today. Reports were reviewed by me today.    Post op CXR 3/7/22 - Chest tube in place. No pneumothorax. Lung fields clear.

## 2022-03-07 NOTE — BRIEF OPERATIVE NOTE - OPERATION/FINDINGS
FB (pig bronchus of RUL), right robot-assisted VATS superior segmentectomy with MLND (level 7, 8, 9, 10, 11)  24F R CT

## 2022-03-07 NOTE — BRIEF OPERATIVE NOTE - COMMENTS
I, SHELBY Orozco served as first assistant on this operation. My involvement was including but not limited to positioning, prepping and draping, robotic port placement, robot docking, robotic instrument insertion and removal, exposure for the surgeon by using instruments, retrieving specimens from the operative field, closing surgical incisions, undocking the robot and dressing wounds. As well as other tasks as directed by the surgeon.

## 2022-03-07 NOTE — BRIEF OPERATIVE NOTE - NSICDXBRIEFPROCEDURE_GEN_ALL_CORE_FT
PROCEDURES:  Segmentectomy, lung, robot-assisted, thoracoscopic 07-Mar-2022 10:40:06  Alfredo Hunt

## 2022-03-07 NOTE — PATIENT PROFILE ADULT - FUNCTIONAL ASSESSMENT - DAILY ACTIVITY 1.
See below, does PCP want to prescribe alternative med. Please advise.   4 = No assist / stand by assistance

## 2022-03-07 NOTE — PATIENT PROFILE ADULT - FALL HARM RISK - HARM RISK INTERVENTIONS

## 2022-03-07 NOTE — PATIENT PROFILE ADULT - NSPRONUTRITIONRISK_GEN_A_NUR
Patient discharged at this time to [facility] facility. Report given to rehab RN, no further questions. Patient leaving with all personal belongings.    No indicators present

## 2022-03-08 LAB
ALBUMIN SERPL ELPH-MCNC: 4 G/DL — SIGNIFICANT CHANGE UP (ref 3.3–5)
ALP SERPL-CCNC: 44 U/L — SIGNIFICANT CHANGE UP (ref 40–120)
ALT FLD-CCNC: 22 U/L — SIGNIFICANT CHANGE UP (ref 4–33)
ANION GAP SERPL CALC-SCNC: 9 MMOL/L — SIGNIFICANT CHANGE UP (ref 7–14)
APTT BLD: 26.8 SEC — LOW (ref 27–36.3)
AST SERPL-CCNC: 23 U/L — SIGNIFICANT CHANGE UP (ref 4–32)
BILIRUB DIRECT SERPL-MCNC: 0.2 MG/DL — SIGNIFICANT CHANGE UP (ref 0–0.3)
BILIRUB INDIRECT FLD-MCNC: 0.7 MG/DL — SIGNIFICANT CHANGE UP (ref 0–1)
BILIRUB SERPL-MCNC: 0.9 MG/DL — SIGNIFICANT CHANGE UP (ref 0.2–1.2)
BUN SERPL-MCNC: 15 MG/DL — SIGNIFICANT CHANGE UP (ref 7–23)
CALCIUM SERPL-MCNC: 8.2 MG/DL — LOW (ref 8.4–10.5)
CHLORIDE SERPL-SCNC: 98 MMOL/L — SIGNIFICANT CHANGE UP (ref 98–107)
CO2 SERPL-SCNC: 24 MMOL/L — SIGNIFICANT CHANGE UP (ref 22–31)
CREAT SERPL-MCNC: 0.47 MG/DL — LOW (ref 0.5–1.3)
EGFR: 102 ML/MIN/1.73M2 — SIGNIFICANT CHANGE UP
GLUCOSE SERPL-MCNC: 122 MG/DL — HIGH (ref 70–99)
HCT VFR BLD CALC: 30.3 % — LOW (ref 34.5–45)
HGB BLD-MCNC: 9.8 G/DL — LOW (ref 11.5–15.5)
INR BLD: 1.03 RATIO — SIGNIFICANT CHANGE UP (ref 0.88–1.16)
MAGNESIUM SERPL-MCNC: 2.1 MG/DL — SIGNIFICANT CHANGE UP (ref 1.6–2.6)
MCHC RBC-ENTMCNC: 29.8 PG — SIGNIFICANT CHANGE UP (ref 27–34)
MCHC RBC-ENTMCNC: 32.3 GM/DL — SIGNIFICANT CHANGE UP (ref 32–36)
MCV RBC AUTO: 92.1 FL — SIGNIFICANT CHANGE UP (ref 80–100)
NRBC # BLD: 0 /100 WBCS — SIGNIFICANT CHANGE UP
NRBC # FLD: 0 K/UL — SIGNIFICANT CHANGE UP
PHOSPHATE SERPL-MCNC: 3.4 MG/DL — SIGNIFICANT CHANGE UP (ref 2.5–4.5)
PLATELET # BLD AUTO: 187 K/UL — SIGNIFICANT CHANGE UP (ref 150–400)
POTASSIUM SERPL-MCNC: 4.2 MMOL/L — SIGNIFICANT CHANGE UP (ref 3.5–5.3)
POTASSIUM SERPL-SCNC: 4.2 MMOL/L — SIGNIFICANT CHANGE UP (ref 3.5–5.3)
PROT SERPL-MCNC: 5.9 G/DL — LOW (ref 6–8.3)
PROTHROM AB SERPL-ACNC: 11.9 SEC — SIGNIFICANT CHANGE UP (ref 10.5–13.4)
RBC # BLD: 3.29 M/UL — LOW (ref 3.8–5.2)
RBC # FLD: 12.3 % — SIGNIFICANT CHANGE UP (ref 10.3–14.5)
SODIUM SERPL-SCNC: 131 MMOL/L — LOW (ref 135–145)
WBC # BLD: 7.2 K/UL — SIGNIFICANT CHANGE UP (ref 3.8–10.5)
WBC # FLD AUTO: 7.2 K/UL — SIGNIFICANT CHANGE UP (ref 3.8–10.5)

## 2022-03-08 PROCEDURE — 71045 X-RAY EXAM CHEST 1 VIEW: CPT | Mod: 26

## 2022-03-08 PROCEDURE — 71045 X-RAY EXAM CHEST 1 VIEW: CPT | Mod: 26,77

## 2022-03-08 PROCEDURE — 99232 SBSQ HOSP IP/OBS MODERATE 35: CPT

## 2022-03-08 RX ORDER — ACETAMINOPHEN 500 MG
825 TABLET ORAL ONCE
Refills: 0 | Status: COMPLETED | OUTPATIENT
Start: 2022-03-08 | End: 2022-03-08

## 2022-03-08 RX ORDER — ALBUMIN HUMAN 25 %
250 VIAL (ML) INTRAVENOUS ONCE
Refills: 0 | Status: COMPLETED | OUTPATIENT
Start: 2022-03-08 | End: 2022-03-08

## 2022-03-08 RX ORDER — ACETAMINOPHEN 500 MG
650 TABLET ORAL EVERY 6 HOURS
Refills: 0 | Status: COMPLETED | OUTPATIENT
Start: 2022-03-08 | End: 2022-03-10

## 2022-03-08 RX ORDER — OXYCODONE HYDROCHLORIDE 5 MG/1
2.5 TABLET ORAL
Refills: 0 | Status: DISCONTINUED | OUTPATIENT
Start: 2022-03-08 | End: 2022-03-14

## 2022-03-08 RX ORDER — SODIUM CHLORIDE 9 MG/ML
4 INJECTION INTRAMUSCULAR; INTRAVENOUS; SUBCUTANEOUS EVERY 6 HOURS
Refills: 0 | Status: DISCONTINUED | OUTPATIENT
Start: 2022-03-08 | End: 2022-03-09

## 2022-03-08 RX ORDER — OXYCODONE HYDROCHLORIDE 5 MG/1
5 TABLET ORAL
Refills: 0 | Status: DISCONTINUED | OUTPATIENT
Start: 2022-03-08 | End: 2022-03-14

## 2022-03-08 RX ADMIN — POLYETHYLENE GLYCOL 3350 17 GRAM(S): 17 POWDER, FOR SOLUTION ORAL at 13:58

## 2022-03-08 RX ADMIN — OXYCODONE HYDROCHLORIDE 2.5 MILLIGRAM(S): 5 TABLET ORAL at 14:04

## 2022-03-08 RX ADMIN — SENNA PLUS 2 TABLET(S): 8.6 TABLET ORAL at 21:04

## 2022-03-08 RX ADMIN — SODIUM CHLORIDE 4 MILLILITER(S): 9 INJECTION INTRAMUSCULAR; INTRAVENOUS; SUBCUTANEOUS at 22:12

## 2022-03-08 RX ADMIN — Medication 15 MILLIGRAM(S): at 07:25

## 2022-03-08 RX ADMIN — SODIUM CHLORIDE 4 MILLILITER(S): 9 INJECTION INTRAMUSCULAR; INTRAVENOUS; SUBCUTANEOUS at 16:08

## 2022-03-08 RX ADMIN — Medication 650 MILLIGRAM(S): at 16:03

## 2022-03-08 RX ADMIN — Medication 825 MILLIGRAM(S): at 03:30

## 2022-03-08 RX ADMIN — ONDANSETRON 4 MILLIGRAM(S): 8 TABLET, FILM COATED ORAL at 07:33

## 2022-03-08 RX ADMIN — HEPARIN SODIUM 5000 UNIT(S): 5000 INJECTION INTRAVENOUS; SUBCUTANEOUS at 07:01

## 2022-03-08 RX ADMIN — Medication 650 MILLIGRAM(S): at 10:00

## 2022-03-08 RX ADMIN — Medication 650 MILLIGRAM(S): at 10:11

## 2022-03-08 RX ADMIN — Medication 75 MICROGRAM(S): at 09:04

## 2022-03-08 RX ADMIN — LIDOCAINE 1 PATCH: 4 CREAM TOPICAL at 14:03

## 2022-03-08 RX ADMIN — HEPARIN SODIUM 5000 UNIT(S): 5000 INJECTION INTRAVENOUS; SUBCUTANEOUS at 17:24

## 2022-03-08 RX ADMIN — OXYCODONE HYDROCHLORIDE 5 MILLIGRAM(S): 5 TABLET ORAL at 20:29

## 2022-03-08 RX ADMIN — OXYCODONE HYDROCHLORIDE 5 MILLIGRAM(S): 5 TABLET ORAL at 20:46

## 2022-03-08 RX ADMIN — Medication 330 MILLIGRAM(S): at 02:49

## 2022-03-08 RX ADMIN — Medication 650 MILLIGRAM(S): at 21:04

## 2022-03-08 RX ADMIN — Medication 125 MILLILITER(S): at 06:47

## 2022-03-08 RX ADMIN — LIDOCAINE 1 PATCH: 4 CREAM TOPICAL at 20:00

## 2022-03-08 RX ADMIN — Medication 15 MILLIGRAM(S): at 06:54

## 2022-03-08 RX ADMIN — PANTOPRAZOLE SODIUM 40 MILLIGRAM(S): 20 TABLET, DELAYED RELEASE ORAL at 06:57

## 2022-03-08 RX ADMIN — Medication 650 MILLIGRAM(S): at 21:22

## 2022-03-08 NOTE — PROGRESS NOTE ADULT - SUBJECTIVE AND OBJECTIVE BOX
THORMANASA MEJIA      71y   Female   [id number]         penicillin (Unknown)             Daily     Daily Drug Dosing Weight  Height (cm): 161.9 (07 Mar 2022 05:52)  Weight (kg): 55.3 (07 Mar 2022 05:52)  BMI (kg/m2): 21.1 (07 Mar 2022 05:52)  BSA (m2): 1.58 (07 Mar 2022 05:52)    HPI:  Pt is a 71 yr old female scheduled for Robotic Assisted Right Video Assisted Thoracoscopy Right Superior Segmentectomy with Dr Escobar tentatively 3/7/22  - Pt hx malignant melanoma removed right flank 2/21 and CT noted RLL ground glass opacity. 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 Further CT scan noted solid and ground glass pulmonary nodules. Pt also noted to have poss pericardial diverticulum on CT scan 11/21 - pt to have Cardiac MRI tomorrow to further evaluate. Pt denies SOB or CP but admits to extreme fatigue for many years and diffuse joint / muscle pains that come and go. hx Hypothyroid and depression/anxiety - pt cancelled surgery scheduled 1/25/22 because of extreme anxiety     Pt instructed to contact surgeon's office concerning COVID test prior to surgery  (18 Feb 2022 15:08)    Procedure: right robot-assisted VATS superior segmentectomy with MLND (level 7, 8, 9, 10, 11) 07-Mar-2022                        Issues:              Lung nodule              Postop pain              Chest tube in place  S/P Lobectomy of L lung  Hypothyroidism  Hx of melanoma s/p resection  Hx of cervical cancer   Anxiety  Depression  Chronic fatigue syndrome                Home Medications:  armodafinil 150 mg oral tablet: 1 tab(s) orally once a day (07 Mar 2022 06:21)  armodafinil 50 mg oral tablet: 2 tab(s) orally once a day (07 Mar 2022 06:21)  levothyroxine 75 mcg (0.075 mg) oral tablet: 1 tab(s) orally once a day (07 Mar 2022 06:21)  vitamin C: OTC supplement  (07 Mar 2022 06:21)  vitamin D: OTC supplement  (07 Mar 2022 06:21)  zinc: OTC supplement  (07 Mar 2022 06:21)    PAST MEDICAL & SURGICAL HISTORY:  Depression    Anxiety    Mononucleosis    Lyme disease    Hypothyroid    Vertigo    Chronic fatigue syndrome    Pulmonary nodule    Uterine fibroid    Cervical ca    Malignant melanoma    History of thoracotomy    Malignant melanoma  2021 - removed right back    H/O cone biopsy of cervix      Vital Signs Last 24 Hrs  T(C): 37 (08 Mar 2022 08:00), Max: 37.1 (08 Mar 2022 00:00)  T(F): 98.6 (08 Mar 2022 08:00), Max: 98.8 (08 Mar 2022 00:00)  HR: 63 (08 Mar 2022 10:00) (50 - 64)  BP: 109/65 (08 Mar 2022 10:00) (89/44 - 147/64)  BP(mean): 73 (08 Mar 2022 10:00) (54 - 85)  RR: 16 (08 Mar 2022 10:00) (9 - 20)  SpO2: 99% (08 Mar 2022 10:00) (70% - 100%)  I&O's Detail    07 Mar 2022 07:01  -  08 Mar 2022 07:00  --------------------------------------------------------  IN:    IV PiggyBack: 700 mL    Lactated Ringers: 540 mL  Total IN: 1240 mL    OUT:    Chest Tube (mL): 100 mL    Indwelling Catheter - Urethral (mL): 1400 mL  Total OUT: 1500 mL    Total NET: -260 mL      08 Mar 2022 07:01  -  08 Mar 2022 10:40  --------------------------------------------------------  IN:    Lactated Ringers: 60 mL    Oral Fluid: 360 mL  Total IN: 420 mL    OUT:    Chest Tube (mL): 30 mL    Voided (mL): 200 mL  Total OUT: 230 mL    Total NET: 190 mL        CAPILLARY BLOOD GLUCOSE        Home Medications:  armodafinil 150 mg oral tablet: 1 tab(s) orally once a day (07 Mar 2022 06:21)  armodafinil 50 mg oral tablet: 2 tab(s) orally once a day (07 Mar 2022 06:21)  levothyroxine 75 mcg (0.075 mg) oral tablet: 1 tab(s) orally once a day (07 Mar 2022 06:21)  vitamin C: OTC supplement  (07 Mar 2022 06:21)  vitamin D: OTC supplement  (07 Mar 2022 06:21)  zinc: OTC supplement  (07 Mar 2022 06:21)    MEDICATIONS  (STANDING):  acetaminophen     Tablet .. 650 milliGRAM(s) Oral every 6 hours  heparin   Injectable 5000 Unit(s) SubCutaneous every 12 hours  lactated ringers. 1000 milliLiter(s) (30 mL/Hr) IV Continuous <Continuous>  levothyroxine 75 MICROGram(s) Oral daily  lidocaine   4% Patch 1 Patch Transdermal daily  pantoprazole    Tablet 40 milliGRAM(s) Oral before breakfast  polyethylene glycol 3350 17 Gram(s) Oral daily  senna 2 Tablet(s) Oral at bedtime    MEDICATIONS  (PRN):  ketorolac   Injectable 15 milliGRAM(s) IV Push every 8 hours PRN pain not controlled by PCA and unable to give IV tylenol  metoclopramide Injectable 10 milliGRAM(s) IV Push every 8 hours PRN nausea or vomiting not controlled by zofran  naloxone Injectable 0.1 milliGRAM(s) IV Push every 3 minutes PRN For ANY of the following changes in patient status:  A. RR LESS THAN 10 breaths per minute, B. Oxygen saturation LESS THAN 90%, C. Sedation score of 6  ondansetron Injectable 4 milliGRAM(s) IV Push every 6 hours PRN Nausea  oxyCODONE    IR 5 milliGRAM(s) Oral every 3 hours PRN Severe Pain (7 - 10)  oxyCODONE    IR 2.5 milliGRAM(s) Oral every 3 hours PRN Moderate Pain (4 - 6)        Physical exam:                             General:               Pt is awake, alert,  appears to be in pain but not in  distress                                                  Neuro:                  Nonfocal                             Cardiovascular:   S1 & S2, regular / irregular                          Respiratory:         Air entry is fair and equal on both sides, has bilateral conducted sounds                           GI:                          Soft, nondistended and nontender, Bowel sounds active                            Ext:                        No cyanosis or edema     Labs:                                                                           9.8    7.20  )-----------( 187      ( 08 Mar 2022 04:59 )             30.3             03-08    131<L>  |  98  |  15  ----------------------------<  122<H>  4.2   |  24  |  0.47<L>    Ca    8.2<L>      08 Mar 2022 04:59  Phos  3.4     03-08  Mg     2.10     03-08    TPro  5.9<L>  /  Alb  4.0  /  TBili  0.9  /  DBili  0.2  /  AST  23  /  ALT  22  /  AlkPhos  44  03-08                  PT/INR - ( 08 Mar 2022 04:59 )   PT: 11.9 sec;   INR: 1.03 ratio         PTT - ( 08 Mar 2022 04:59 )  PTT:26.8 sec  LIVER FUNCTIONS - ( 08 Mar 2022 04:59 )  Alb: 4.0 g/dL / Pro: 5.9 g/dL / ALK PHOS: 44 U/L / ALT: 22 U/L / AST: 23 U/L / GGT: x                 CXR:  < from: Xray Chest 1 View- PORTABLE-Urgent (Xray Chest 1 View- PORTABLE-Urgent .) (03.07.22 @ 12:10) >  Right-sided subcutaneous emphysema along the neck line and right lateral   chest wall.  Right-sided chest tube in place.  Right lower lung chain sutures noted status post segmentectomy.  The lungs are clear.  No pleural effusion or pneumothorax.  Heart size is within normal limits.  Chronic left fifth rib abnormality but otherwise no acute abnormality   within visible osseous structures.      IMPRESSION: Status post right thoracotomy with chest tube and mild   subcutaneous emphysema.        Plan:  General: 71yFemale s/p right robot-assisted VATS superior segmentectomy with MLND (level 7, 8, 9, 10, 11) 07-Mar-2022 , experiencing  pain with deep breathing.                             Neuro:                                         Pain control with Oxy /  Tylenol PRN    Chronic fatigue syndrome; Restart Armodafanil                            Cardiovascular:                                          Continue hemodynamic monitoring.                            Respiratory:                                         Pt is on 2L  nasal canula, wean off as tolerated                                          Comfortable, not in any distress.                                         Encourage incentive spirometry                                          Monitor chest tube output                                         Chest tube to suction                                                                   Continue bronchodilators, pulmonary toilet                            GI                                         On  regular diet as tolerated                                         Continue Zofran / Reglan for nausea - PRN                                         Continue bowel regimen	                                                                 Renal:                                         Continue LR  30cc/hr                                         Monitor I/Os and electrolytes                                                                                        Hem/ Onc:                                         DVT prophylaxis with SQ Heparin and SCDs                                         Monitor chest tube output &  signs of bleeding.                                          Follow CBC in AM                           Infectious disease:                                            Monitor for fever / leukocytosis.                                          All surgical incision / chest tube  sites look clean                            Endocrine      Hypothyroidism: Continue Synthroid                                          Continue Accu-Checks with coverage       Pertinent clinical, laboratory, radiographic, hemodynamic, echocardiographic, respiratory data, microbiologic data and chart were reviewed and analyzed frequently throughout the course of the day and night  Patient seen, examined and plan discussed with CT Surgeon Dr. Escobar  / CTICU team during rounds.    OOB to chair and ambulate as tolerated.     Status discussed with patient /  patient's family and updated plan of care    I have spent 40 minutes with this patient including 20 minutes of time coordinating care, updating patient family.            Barry Lee MD

## 2022-03-08 NOTE — PHYSICAL THERAPY INITIAL EVALUATION ADULT - ADDITIONAL COMMENTS
Pt reports she lives alone in a second floor apartment with stairs up to the second floor and no elevator. Pt states prior to admission she was independent in functional mobility and ADLs without use of a device, and was a community ambulator.    Following evaluation, pt was left sitting in chair in no acute distress, all lines intact.

## 2022-03-08 NOTE — PROGRESS NOTE ADULT - SUBJECTIVE AND OBJECTIVE BOX
Anesthesia Pain Management Service    SUBJECTIVE: Patient states that she has some nausea and dizziness that was aggravated when she got out of bed to chair this morning.   The patient states she does have a history of vertigo and she does not like taking medication for it because it makes her extremely tired which doesn't help her chronic fatigue syndrome.     Pain Scale Score	At rest: ___ 	With Activity: ___ 	[X ] Refer to charted pain scores    THERAPY:    [ ] IV PCA Morphine		[ ] 5 mg/mL	[ ] 1 mg/mL  [X ] IV PCA Hydromorphone	[ ] 5 mg/mL	[X ] 1 mg/mL  [ ] IV PCA Fentanyl		[ ] 50 micrograms/mL    Demand dose __0.2_ lockout __6_ (minutes) Continuous Rate _0__ Total: _2.6__   mg used (in past 24 hrs)      MEDICATIONS  (STANDING):  acetaminophen     Tablet .. 650 milliGRAM(s) Oral every 6 hours  heparin   Injectable 5000 Unit(s) SubCutaneous every 12 hours  lactated ringers. 1000 milliLiter(s) (30 mL/Hr) IV Continuous <Continuous>  levothyroxine 75 MICROGram(s) Oral daily  lidocaine   4% Patch 1 Patch Transdermal daily  pantoprazole    Tablet 40 milliGRAM(s) Oral before breakfast  polyethylene glycol 3350 17 Gram(s) Oral daily  senna 2 Tablet(s) Oral at bedtime    MEDICATIONS  (PRN):  ketorolac   Injectable 15 milliGRAM(s) IV Push every 8 hours PRN pain not controlled by PCA and unable to give IV tylenol  metoclopramide Injectable 10 milliGRAM(s) IV Push every 8 hours PRN nausea or vomiting not controlled by zofran  naloxone Injectable 0.1 milliGRAM(s) IV Push every 3 minutes PRN For ANY of the following changes in patient status:  A. RR LESS THAN 10 breaths per minute, B. Oxygen saturation LESS THAN 90%, C. Sedation score of 6  ondansetron Injectable 4 milliGRAM(s) IV Push every 6 hours PRN Nausea  oxyCODONE    IR 5 milliGRAM(s) Oral every 3 hours PRN Severe Pain (7 - 10)  oxyCODONE    IR 2.5 milliGRAM(s) Oral every 3 hours PRN Moderate Pain (4 - 6)      OBJECTIVE:  Patient is sitting up in chair, with CTx1.     Sedation Score:	[ X] Alert	[ ] Drowsy 	[ ] Arousable	[ ] Asleep	[ ] Unresponsive    Side Effects:	[X ] None	[ ] Nausea	[ ] Vomiting	[ ] Pruritus  		[ ] Other:    Vital Signs Last 24 Hrs  T(C): 37 (08 Mar 2022 08:00), Max: 37.1 (08 Mar 2022 00:00)  T(F): 98.6 (08 Mar 2022 08:00), Max: 98.8 (08 Mar 2022 00:00)  HR: 58 (08 Mar 2022 11:00) (50 - 64)  BP: 98/47 (08 Mar 2022 11:00) (89/44 - 134/58)  BP(mean): 59 (08 Mar 2022 11:00) (54 - 79)  RR: 17 (08 Mar 2022 11:00) (9 - 19)  SpO2: 99% (08 Mar 2022 11:00) (70% - 100%)    ASSESSMENT/ PLAN    Therapy to  be:	[ ] Continue   [ X] Discontinued   [X ] Change to prn Analgesics    Documentation and Verification of current medications:   [X] Done	[ ] Not done, not elligible    Comments: Discussed patient with team and IV Dilaudid PCA discontinued. PRN Oral/IV opioids and/or Adjuvant non-opioid medication to be ordered at this point.    Progress Note written now but Patient was seen earlier.

## 2022-03-08 NOTE — PROGRESS NOTE ADULT - SUBJECTIVE AND OBJECTIVE BOX
ANESTHESIA POSTOP CHECK    71y Female POSTOP DAY 1 S/P RVATS/MLND/Segmentectomy     Vital Signs Last 24 Hrs  T(C): 37 (08 Mar 2022 08:00), Max: 37.1 (08 Mar 2022 00:00)  T(F): 98.6 (08 Mar 2022 08:00), Max: 98.8 (08 Mar 2022 00:00)  HR: 58 (08 Mar 2022 11:00) (50 - 64)  BP: 98/47 (08 Mar 2022 11:00) (89/44 - 134/58)  BP(mean): 59 (08 Mar 2022 11:00) (54 - 79)  RR: 17 (08 Mar 2022 11:00) (9 - 19)  SpO2: 99% (08 Mar 2022 11:00) (70% - 100%)  I&O's Summary    07 Mar 2022 07:01  -  08 Mar 2022 07:00  --------------------------------------------------------  IN: 1240 mL / OUT: 1500 mL / NET: -260 mL    08 Mar 2022 07:01  -  08 Mar 2022 11:19  --------------------------------------------------------  IN: 420 mL / OUT: 230 mL / NET: 190 mL        [X ] NO APPARENT ANESTHESIA COMPLICATIONS      Comments: Pt states that she has 4/10 pain with movement. Will d/c IVPCA and transition to standing PO oxycodone.

## 2022-03-08 NOTE — PROGRESS NOTE ADULT - SUBJECTIVE AND OBJECTIVE BOX
Anesthesia Pain Management Service- Attending Addendum    SUBJECTIVE: Patient's pain control adequate    Therapy:	  [ X] IV PCA	   [ ] Epidural           [ ] s/p Spinal Opoid              [ ] Postpartum infusion	  [ ] Patient controlled regional anesthesia (PCRA)    [ ] prn Analgesics    Allergies    penicillin (Unknown)    Intolerances      MEDICATIONS  (STANDING):  acetaminophen     Tablet .. 650 milliGRAM(s) Oral every 6 hours  heparin   Injectable 5000 Unit(s) SubCutaneous every 12 hours  lactated ringers. 1000 milliLiter(s) (30 mL/Hr) IV Continuous <Continuous>  levothyroxine 75 MICROGram(s) Oral daily  lidocaine   4% Patch 1 Patch Transdermal daily  pantoprazole    Tablet 40 milliGRAM(s) Oral before breakfast  polyethylene glycol 3350 17 Gram(s) Oral daily  senna 2 Tablet(s) Oral at bedtime    MEDICATIONS  (PRN):  ketorolac   Injectable 15 milliGRAM(s) IV Push every 8 hours PRN pain not controlled by PCA and unable to give IV tylenol  metoclopramide Injectable 10 milliGRAM(s) IV Push every 8 hours PRN nausea or vomiting not controlled by zofran  naloxone Injectable 0.1 milliGRAM(s) IV Push every 3 minutes PRN For ANY of the following changes in patient status:  A. RR LESS THAN 10 breaths per minute, B. Oxygen saturation LESS THAN 90%, C. Sedation score of 6  ondansetron Injectable 4 milliGRAM(s) IV Push every 6 hours PRN Nausea  oxyCODONE    IR 5 milliGRAM(s) Oral every 3 hours PRN Severe Pain (7 - 10)  oxyCODONE    IR 2.5 milliGRAM(s) Oral every 3 hours PRN Moderate Pain (4 - 6)      OBJECTIVE:   [X] No new signs     [ ] Other:    Side Effects:  [X ] None			[ ] Other:      ASSESSMENT/PLAN  -Discontinue current therapy    [ ] Therapy changed to:    [ ] IV PCA       [ ] Epidural     [ X] prn Analgesics     Comments: Pain management per primary team, APS to sign off    Note entered after patient seen

## 2022-03-08 NOTE — PHYSICAL THERAPY INITIAL EVALUATION ADULT - PATIENT PROFILE REVIEW, REHAB EVAL
Activity orders - ambulate as tolerated. Spoke to RN Ambika, pt cleared to participate in PT evaluation/yes

## 2022-03-09 LAB
ANION GAP SERPL CALC-SCNC: 10 MMOL/L — SIGNIFICANT CHANGE UP (ref 7–14)
BUN SERPL-MCNC: 12 MG/DL — SIGNIFICANT CHANGE UP (ref 7–23)
CALCIUM SERPL-MCNC: 8.4 MG/DL — SIGNIFICANT CHANGE UP (ref 8.4–10.5)
CHLORIDE SERPL-SCNC: 102 MMOL/L — SIGNIFICANT CHANGE UP (ref 98–107)
CO2 SERPL-SCNC: 25 MMOL/L — SIGNIFICANT CHANGE UP (ref 22–31)
CREAT SERPL-MCNC: 0.64 MG/DL — SIGNIFICANT CHANGE UP (ref 0.5–1.3)
EGFR: 95 ML/MIN/1.73M2 — SIGNIFICANT CHANGE UP
GLUCOSE SERPL-MCNC: 115 MG/DL — HIGH (ref 70–99)
HCT VFR BLD CALC: 31.4 % — LOW (ref 34.5–45)
HGB BLD-MCNC: 10.4 G/DL — LOW (ref 11.5–15.5)
MAGNESIUM SERPL-MCNC: 2.1 MG/DL — SIGNIFICANT CHANGE UP (ref 1.6–2.6)
MCHC RBC-ENTMCNC: 30.1 PG — SIGNIFICANT CHANGE UP (ref 27–34)
MCHC RBC-ENTMCNC: 33.1 GM/DL — SIGNIFICANT CHANGE UP (ref 32–36)
MCV RBC AUTO: 91 FL — SIGNIFICANT CHANGE UP (ref 80–100)
NRBC # BLD: 0 /100 WBCS — SIGNIFICANT CHANGE UP
NRBC # FLD: 0 K/UL — SIGNIFICANT CHANGE UP
PHOSPHATE SERPL-MCNC: 2.3 MG/DL — LOW (ref 2.5–4.5)
PLATELET # BLD AUTO: 188 K/UL — SIGNIFICANT CHANGE UP (ref 150–400)
POTASSIUM SERPL-MCNC: 4.2 MMOL/L — SIGNIFICANT CHANGE UP (ref 3.5–5.3)
POTASSIUM SERPL-SCNC: 4.2 MMOL/L — SIGNIFICANT CHANGE UP (ref 3.5–5.3)
RBC # BLD: 3.45 M/UL — LOW (ref 3.8–5.2)
RBC # FLD: 12.6 % — SIGNIFICANT CHANGE UP (ref 10.3–14.5)
SODIUM SERPL-SCNC: 137 MMOL/L — SIGNIFICANT CHANGE UP (ref 135–145)
WBC # BLD: 10.71 K/UL — HIGH (ref 3.8–10.5)
WBC # FLD AUTO: 10.71 K/UL — HIGH (ref 3.8–10.5)

## 2022-03-09 PROCEDURE — 99233 SBSQ HOSP IP/OBS HIGH 50: CPT

## 2022-03-09 PROCEDURE — 71045 X-RAY EXAM CHEST 1 VIEW: CPT | Mod: 26

## 2022-03-09 RX ORDER — PIPERACILLIN AND TAZOBACTAM 4; .5 G/20ML; G/20ML
3.38 INJECTION, POWDER, LYOPHILIZED, FOR SOLUTION INTRAVENOUS ONCE
Refills: 0 | Status: COMPLETED | OUTPATIENT
Start: 2022-03-09 | End: 2022-03-09

## 2022-03-09 RX ORDER — POTASSIUM PHOSPHATE, MONOBASIC POTASSIUM PHOSPHATE, DIBASIC 236; 224 MG/ML; MG/ML
15 INJECTION, SOLUTION INTRAVENOUS ONCE
Refills: 0 | Status: COMPLETED | OUTPATIENT
Start: 2022-03-09 | End: 2022-03-09

## 2022-03-09 RX ORDER — DORNASE ALFA 1 MG/ML
2.5 SOLUTION RESPIRATORY (INHALATION)
Refills: 0 | Status: DISCONTINUED | OUTPATIENT
Start: 2022-03-09 | End: 2022-03-14

## 2022-03-09 RX ORDER — PIPERACILLIN AND TAZOBACTAM 4; .5 G/20ML; G/20ML
3.38 INJECTION, POWDER, LYOPHILIZED, FOR SOLUTION INTRAVENOUS EVERY 6 HOURS
Refills: 0 | Status: DISCONTINUED | OUTPATIENT
Start: 2022-03-09 | End: 2022-03-12

## 2022-03-09 RX ORDER — VANCOMYCIN HCL 1 G
750 VIAL (EA) INTRAVENOUS EVERY 12 HOURS
Refills: 0 | Status: DISCONTINUED | OUTPATIENT
Start: 2022-03-09 | End: 2022-03-11

## 2022-03-09 RX ADMIN — Medication 650 MILLIGRAM(S): at 22:38

## 2022-03-09 RX ADMIN — POLYETHYLENE GLYCOL 3350 17 GRAM(S): 17 POWDER, FOR SOLUTION ORAL at 18:02

## 2022-03-09 RX ADMIN — SODIUM CHLORIDE 4 MILLILITER(S): 9 INJECTION INTRAMUSCULAR; INTRAVENOUS; SUBCUTANEOUS at 09:54

## 2022-03-09 RX ADMIN — POTASSIUM PHOSPHATE, MONOBASIC POTASSIUM PHOSPHATE, DIBASIC 62.5 MILLIMOLE(S): 236; 224 INJECTION, SOLUTION INTRAVENOUS at 11:12

## 2022-03-09 RX ADMIN — PIPERACILLIN AND TAZOBACTAM 25 GRAM(S): 4; .5 INJECTION, POWDER, LYOPHILIZED, FOR SOLUTION INTRAVENOUS at 22:38

## 2022-03-09 RX ADMIN — Medication 15 MILLIGRAM(S): at 05:10

## 2022-03-09 RX ADMIN — PIPERACILLIN AND TAZOBACTAM 25 GRAM(S): 4; .5 INJECTION, POWDER, LYOPHILIZED, FOR SOLUTION INTRAVENOUS at 17:30

## 2022-03-09 RX ADMIN — Medication 15 MILLIGRAM(S): at 14:47

## 2022-03-09 RX ADMIN — Medication 650 MILLIGRAM(S): at 11:12

## 2022-03-09 RX ADMIN — OXYCODONE HYDROCHLORIDE 5 MILLIGRAM(S): 5 TABLET ORAL at 22:38

## 2022-03-09 RX ADMIN — Medication 650 MILLIGRAM(S): at 17:54

## 2022-03-09 RX ADMIN — LIDOCAINE 1 PATCH: 4 CREAM TOPICAL at 02:00

## 2022-03-09 RX ADMIN — SENNA PLUS 2 TABLET(S): 8.6 TABLET ORAL at 22:38

## 2022-03-09 RX ADMIN — OXYCODONE HYDROCHLORIDE 2.5 MILLIGRAM(S): 5 TABLET ORAL at 09:49

## 2022-03-09 RX ADMIN — OXYCODONE HYDROCHLORIDE 5 MILLIGRAM(S): 5 TABLET ORAL at 22:53

## 2022-03-09 RX ADMIN — OXYCODONE HYDROCHLORIDE 5 MILLIGRAM(S): 5 TABLET ORAL at 16:55

## 2022-03-09 RX ADMIN — OXYCODONE HYDROCHLORIDE 2.5 MILLIGRAM(S): 5 TABLET ORAL at 08:49

## 2022-03-09 RX ADMIN — SODIUM CHLORIDE 4 MILLILITER(S): 9 INJECTION INTRAMUSCULAR; INTRAVENOUS; SUBCUTANEOUS at 15:24

## 2022-03-09 RX ADMIN — Medication 15 MILLIGRAM(S): at 15:02

## 2022-03-09 RX ADMIN — DORNASE ALFA 2.5 MILLIGRAM(S): 1 SOLUTION RESPIRATORY (INHALATION) at 21:38

## 2022-03-09 RX ADMIN — HEPARIN SODIUM 5000 UNIT(S): 5000 INJECTION INTRAVENOUS; SUBCUTANEOUS at 17:54

## 2022-03-09 RX ADMIN — PIPERACILLIN AND TAZOBACTAM 200 GRAM(S): 4; .5 INJECTION, POWDER, LYOPHILIZED, FOR SOLUTION INTRAVENOUS at 08:45

## 2022-03-09 RX ADMIN — Medication 650 MILLIGRAM(S): at 03:50

## 2022-03-09 RX ADMIN — OXYCODONE HYDROCHLORIDE 5 MILLIGRAM(S): 5 TABLET ORAL at 03:40

## 2022-03-09 RX ADMIN — PANTOPRAZOLE SODIUM 40 MILLIGRAM(S): 20 TABLET, DELAYED RELEASE ORAL at 06:23

## 2022-03-09 RX ADMIN — OXYCODONE HYDROCHLORIDE 5 MILLIGRAM(S): 5 TABLET ORAL at 03:05

## 2022-03-09 RX ADMIN — Medication 250 MILLIGRAM(S): at 17:00

## 2022-03-09 RX ADMIN — Medication 15 MILLIGRAM(S): at 05:50

## 2022-03-09 RX ADMIN — Medication 75 MICROGRAM(S): at 05:10

## 2022-03-09 RX ADMIN — Medication 650 MILLIGRAM(S): at 03:08

## 2022-03-09 RX ADMIN — OXYCODONE HYDROCHLORIDE 5 MILLIGRAM(S): 5 TABLET ORAL at 15:55

## 2022-03-09 NOTE — PROGRESS NOTE ADULT - SUBJECTIVE AND OBJECTIVE BOX
`  CHIEF COMPLAINT: FOLLOW UP IN ICU FOR POSTOPERATIVE CARE OF PATIENT WHO IS S/P LUNG RESECTION      PROCEDURES: right robot-assisted VATS superior segmentectomy with MLND (level 7, 8, 9, 10, 11) 07-Mar-2022     ISSUES:   Lung nodule  Post op pain  Chest tube in place  RLL PNA  S/P Lobectomy of L lung  Hypothyroidism  Hx of melanoma s/p resection  Hx of cervical cancer   Anxiety  Depression      INTERVAL EVENTS:     Febrile overnight to 102, hemodynamically stable, off pressors. Spo2 maintined on RA  Needs chest PT/IS, pulmonary toilet, audible secretions  Chest tube taken out yesterday, stable right small apical pneumothorax  Started on ABX, zosyn/vanco. Rash with PCN 30y without anaphylaxis. Rechallenge in ICU, monitor for reactions. Give first dose slow. Send sputum cultures.      HISTORY:   Patient reports moderate pain at chest wall incision sites which is worse with coughing and deep breathing without associated fever or dyspnea. Pain is improved with use of pain meds.     PHYSICAL EXAM:   Gen: Comfortable, No acute distress  Eyes: Sclera white, Conjunctiva normal, Eyelids normal, Pupils symmetrical   ENT: Mucous membranes moist,  ,  ,    Neck: Trachea midline,  ,  ,  ,  ,  ,    CV: Rate regular, Rhythm regular,  ,  ,    Resp: Breath sounds clear, No accessory muscles use  Abd: Soft, Non-distended, Non-tender, Bowel sounds normal,  ,  ,    Skin: Warm, No peripheral edema of lower extremities,  ,    : No  Butler in place  Neuro: Moving all 4 extremities,    Psych: A&Ox3      ASSESSMENT AND PLAN:     NEURO:  Post-operative Pain - Stable. Pain control with oral oxycodone and Tylenol IV PRN.          RESPIRATORY:  Hypoxia - Wean nasal cannula for goal O2sat above 92. Obtain CXR. Incentive spirometry. Chest PT and frequent suctioning. Continue bronchodilators. OOB to chair & ambulate w/ assistance. Continuous pulse oximetry for support & to prevent decompensation.           CARDIOVASCULAR:  Hemodynamically stable - Not on pressors. Continue hemodynamic monitoring.  Telemetry (medical test) - Reviewed by me today independently. Normal sinus rhythm.             RENAL:  Stable - Monitor IOs and electrolytes. Keep K above 4.0 and Mg above 2.0.        GASTROINTESTINAL:  GI prophylaxis not indicated  Zofran and Reglan IV PRN for nausea  Regular consistency diet        HEMATOLOGIC:  No signs of active bleeding. Monitor Hgb in CBC in AM  DVT prophylaxis with heparin subQ and SCDs.       INFECTIOUS DISEASE:  All surgical sites appear clean. vanco/zosyn, check sputum cultures. Monitor for zosyn allergy in ICU. Will monitor for fever and leukocytosis.          ENDOCRINE:  Stable – Monitor glucose fingersticks for goal 120-180.  Hypothyroidism - stable. Continue Synthroid.            ONCOLOGY:  Lung nodule - Improved. S/P resection. Follow up final pathology.      Pertinent clinical, laboratory, radiographic, hemodynamic, echocardiographic, respiratory data, microbiologic data and chart were reviewed by myself and analyzed frequently throughout the course of the day and night by myself.    Plan discussed at length with the CTICU staff and Attending CT Surgeon -   Dr Ross Escobar.    Patient's status was discussed with patient at bedside.    _________________________  VITAL SIGNS:  Vital Signs Last 24 Hrs  T(C): 37.4 (09 Mar 2022 08:00), Max: 38.3 (09 Mar 2022 04:00)  T(F): 99.3 (09 Mar 2022 08:00), Max: 101 (09 Mar 2022 04:00)  HR: 78 (09 Mar 2022 09:00) (58 - 83)  BP: 127/51 (09 Mar 2022 09:00) (98/47 - 133/67)  BP(mean): 68 (09 Mar 2022 09:00) (54 - 87)  RR: 22 (09 Mar 2022 09:00) (15 - 24)  SpO2: 97% (09 Mar 2022 09:00) (92% - 100%)  I/Os:   I&O's Detail    08 Mar 2022 07:01  -  09 Mar 2022 07:00  --------------------------------------------------------  IN:    Lactated Ringers: 270 mL    Oral Fluid: 1680 mL  Total IN: 1950 mL    OUT:    Chest Tube (mL): 30 mL    Voided (mL): 4025 mL  Total OUT: 4055 mL    Total NET: -2105 mL      09 Mar 2022 07:01  -  09 Mar 2022 09:51  --------------------------------------------------------  IN:  Total IN: 0 mL    OUT:    Voided (mL): 100 mL  Total OUT: 100 mL    Total NET: -100 mL              MEDICATIONS:  MEDICATIONS  (STANDING):  acetaminophen     Tablet .. 650 milliGRAM(s) Oral every 6 hours  heparin   Injectable 5000 Unit(s) SubCutaneous every 12 hours  lactated ringers. 1000 milliLiter(s) (30 mL/Hr) IV Continuous <Continuous>  levothyroxine 75 MICROGram(s) Oral daily  lidocaine   4% Patch 1 Patch Transdermal daily  pantoprazole    Tablet 40 milliGRAM(s) Oral before breakfast  piperacillin/tazobactam IVPB.. 3.375 Gram(s) IV Intermittent every 6 hours  polyethylene glycol 3350 17 Gram(s) Oral daily  potassium phosphate IVPB 15 milliMole(s) IV Intermittent once  senna 2 Tablet(s) Oral at bedtime  sodium chloride 3%  Inhalation 4 milliLiter(s) Inhalation every 6 hours  vancomycin  IVPB 750 milliGRAM(s) IV Intermittent every 12 hours    MEDICATIONS  (PRN):  ketorolac   Injectable 15 milliGRAM(s) IV Push every 8 hours PRN pain not controlled by PCA and unable to give IV tylenol  metoclopramide Injectable 10 milliGRAM(s) IV Push every 8 hours PRN nausea or vomiting not controlled by zofran  naloxone Injectable 0.1 milliGRAM(s) IV Push every 3 minutes PRN For ANY of the following changes in patient status:  A. RR LESS THAN 10 breaths per minute, B. Oxygen saturation LESS THAN 90%, C. Sedation score of 6  ondansetron Injectable 4 milliGRAM(s) IV Push every 6 hours PRN Nausea  oxyCODONE    IR 5 milliGRAM(s) Oral every 3 hours PRN Severe Pain (7 - 10)  oxyCODONE    IR 2.5 milliGRAM(s) Oral every 3 hours PRN Moderate Pain (4 - 6)      LABS:  Laboratory data was independently reviewed by me today.                           10.4   10.71 )-----------( 188      ( 09 Mar 2022 05:20 )             31.4     03-09    137  |  102  |  12  ----------------------------<  115<H>  4.2   |  25  |  0.64    Ca    8.4      09 Mar 2022 05:20  Phos  2.3     03-09  Mg     2.10     03-09    TPro  5.9<L>  /  Alb  4.0  /  TBili  0.9  /  DBili  0.2  /  AST  23  /  ALT  22  /  AlkPhos  44  03-08    LIVER FUNCTIONS - ( 08 Mar 2022 04:59 )  Alb: 4.0 g/dL / Pro: 5.9 g/dL / ALK PHOS: 44 U/L / ALT: 22 U/L / AST: 23 U/L / GGT: x           PT/INR - ( 08 Mar 2022 04:59 )   PT: 11.9 sec;   INR: 1.03 ratio         PTT - ( 08 Mar 2022 04:59 )  PTT:26.8 sec        RADIOLOGY:   Radiology images were independently reviewed by me today. Reports were reviewed by me today.    Xray Chest 1 View- PORTABLE-Urgent:   ACC: [id number] EXAM:  XR CHEST PORTABLE URGENT 1V                          PROCEDURE DATE:  03/08/2022          INTERPRETATION:  INDICATION: Chest tube removal    COMPARISON: 3/8/2022 at 5:29 AM    IMPRESSION: There is a small right pneumothorax status post removal of   right-sided chest tube. There is a stable right lower lobe consolidation.   There is subcutaneous emphysema in the right chest wall as was seen   previously.            --- End of Report ---            FITO CELAYA MD; Attending Radiologist  This document has been electronically signed. Mar  8 2022  5:17PM (03-08-22 @ 15:43)  Xray Chest 1 View AP/PA:   ACC: [id number] EXAM:  XR CHEST AP OR PA 1V                          PROCEDURE DATE:  03/08/2022          INTERPRETATION:  Chest one view    HISTORY: Postop VATS    COMPARISON STUDY: 3/7/2022    Frontal expiratory view of the chest shows the heart nora normal in   size. Right chest tube remains present.    The lungs show progression of right base atelectasis or infiltrate with   mild left atelectasis and there is no evidence of pneumothorax nor   pleural effusion.    IMPRESSION:  Right atelectasis/infiltrate. No pneumothorax.        Thank you for the courtesy of this referral.    --- End of Report ---            RTACI SILVA MD; Attending Interventional Radiologist  This document has been electronically signed. Mar  8 2022  1:56PM (03-08-22 @ 06:02)  Xray Chest 1 View- PORTABLE-Urgent:   ACC: [id number] EXAM:  XR CHEST PORTABLE URGENT 1V                          PROCEDURE DATE:  03/07/2022          INTERPRETATION:  CLINICAL INFORMATION: R VATS status post segmentectomy..    TECHNIQUE: Frontal radiograph of the chest.    COMPARISON: CT chest 1/17/2022 and radiograph of the chest 11/17/2019.    FINDINGS:    Right-sided subcutaneous emphysema along the neck line and right lateral   chest wall.  Right-sided chest tube in place.  Right lower lung chain sutures noted status post segmentectomy.  The lungs are clear.  No pleural effusion or pneumothorax.  Heart size is within normal limits.  Chronic left fifth rib abnormality but otherwise no acute abnormality   within visible osseous structures.      IMPRESSION: Status post right thoracotomy with chest tube and mild   subcutaneous emphysema.        --- End of Report ---          HILARIO MOSELEY MD; Resident Radiology  This document has been electronically signed.  NESTOR HORTON MD; Attending Radiologist  This document has been electronically signed. Mar  7 2022 12:57PM (03-07-22 @ 12:10)

## 2022-03-10 LAB
ANION GAP SERPL CALC-SCNC: 9 MMOL/L — SIGNIFICANT CHANGE UP (ref 7–14)
BUN SERPL-MCNC: 8 MG/DL — SIGNIFICANT CHANGE UP (ref 7–23)
CALCIUM SERPL-MCNC: 9 MG/DL — SIGNIFICANT CHANGE UP (ref 8.4–10.5)
CHLORIDE SERPL-SCNC: 100 MMOL/L — SIGNIFICANT CHANGE UP (ref 98–107)
CO2 SERPL-SCNC: 24 MMOL/L — SIGNIFICANT CHANGE UP (ref 22–31)
CREAT SERPL-MCNC: 0.54 MG/DL — SIGNIFICANT CHANGE UP (ref 0.5–1.3)
EGFR: 99 ML/MIN/1.73M2 — SIGNIFICANT CHANGE UP
GLUCOSE SERPL-MCNC: 137 MG/DL — HIGH (ref 70–99)
GRAM STN FLD: SIGNIFICANT CHANGE UP
HCT VFR BLD CALC: 30 % — LOW (ref 34.5–45)
HGB BLD-MCNC: 9.9 G/DL — LOW (ref 11.5–15.5)
MAGNESIUM SERPL-MCNC: 2 MG/DL — SIGNIFICANT CHANGE UP (ref 1.6–2.6)
MCHC RBC-ENTMCNC: 30.1 PG — SIGNIFICANT CHANGE UP (ref 27–34)
MCHC RBC-ENTMCNC: 33 GM/DL — SIGNIFICANT CHANGE UP (ref 32–36)
MCV RBC AUTO: 91.2 FL — SIGNIFICANT CHANGE UP (ref 80–100)
MRSA PCR RESULT.: SIGNIFICANT CHANGE UP
NRBC # BLD: 0 /100 WBCS — SIGNIFICANT CHANGE UP
NRBC # FLD: 0 K/UL — SIGNIFICANT CHANGE UP
PLATELET # BLD AUTO: 183 K/UL — SIGNIFICANT CHANGE UP (ref 150–400)
POTASSIUM SERPL-MCNC: 3.8 MMOL/L — SIGNIFICANT CHANGE UP (ref 3.5–5.3)
POTASSIUM SERPL-SCNC: 3.8 MMOL/L — SIGNIFICANT CHANGE UP (ref 3.5–5.3)
RBC # BLD: 3.29 M/UL — LOW (ref 3.8–5.2)
RBC # FLD: 12.7 % — SIGNIFICANT CHANGE UP (ref 10.3–14.5)
S AUREUS DNA NOSE QL NAA+PROBE: SIGNIFICANT CHANGE UP
SODIUM SERPL-SCNC: 133 MMOL/L — LOW (ref 135–145)
SPECIMEN SOURCE: SIGNIFICANT CHANGE UP
WBC # BLD: 12.56 K/UL — HIGH (ref 3.8–10.5)
WBC # FLD AUTO: 12.56 K/UL — HIGH (ref 3.8–10.5)

## 2022-03-10 PROCEDURE — 71045 X-RAY EXAM CHEST 1 VIEW: CPT | Mod: 26

## 2022-03-10 PROCEDURE — 99233 SBSQ HOSP IP/OBS HIGH 50: CPT

## 2022-03-10 RX ORDER — ACETAMINOPHEN 500 MG
975 TABLET ORAL EVERY 6 HOURS
Refills: 0 | Status: DISCONTINUED | OUTPATIENT
Start: 2022-03-10 | End: 2022-03-16

## 2022-03-10 RX ORDER — OXYCODONE HYDROCHLORIDE 5 MG/1
5 TABLET ORAL ONCE
Refills: 0 | Status: DISCONTINUED | OUTPATIENT
Start: 2022-03-10 | End: 2022-03-10

## 2022-03-10 RX ORDER — POTASSIUM CHLORIDE 20 MEQ
20 PACKET (EA) ORAL ONCE
Refills: 0 | Status: COMPLETED | OUTPATIENT
Start: 2022-03-10 | End: 2022-03-10

## 2022-03-10 RX ORDER — MODAFINIL 200 MG/1
200 TABLET ORAL
Refills: 0 | Status: DISCONTINUED | OUTPATIENT
Start: 2022-03-11 | End: 2022-03-16

## 2022-03-10 RX ORDER — MODAFINIL 200 MG/1
100 TABLET ORAL
Refills: 0 | Status: DISCONTINUED | OUTPATIENT
Start: 2022-03-11 | End: 2022-03-16

## 2022-03-10 RX ADMIN — PIPERACILLIN AND TAZOBACTAM 25 GRAM(S): 4; .5 INJECTION, POWDER, LYOPHILIZED, FOR SOLUTION INTRAVENOUS at 06:09

## 2022-03-10 RX ADMIN — Medication 975 MILLIGRAM(S): at 14:46

## 2022-03-10 RX ADMIN — OXYCODONE HYDROCHLORIDE 5 MILLIGRAM(S): 5 TABLET ORAL at 08:26

## 2022-03-10 RX ADMIN — LIDOCAINE 1 PATCH: 4 CREAM TOPICAL at 20:41

## 2022-03-10 RX ADMIN — PIPERACILLIN AND TAZOBACTAM 25 GRAM(S): 4; .5 INJECTION, POWDER, LYOPHILIZED, FOR SOLUTION INTRAVENOUS at 19:19

## 2022-03-10 RX ADMIN — HEPARIN SODIUM 5000 UNIT(S): 5000 INJECTION INTRAVENOUS; SUBCUTANEOUS at 06:09

## 2022-03-10 RX ADMIN — Medication 975 MILLIGRAM(S): at 18:26

## 2022-03-10 RX ADMIN — Medication 650 MILLIGRAM(S): at 06:08

## 2022-03-10 RX ADMIN — OXYCODONE HYDROCHLORIDE 5 MILLIGRAM(S): 5 TABLET ORAL at 14:30

## 2022-03-10 RX ADMIN — Medication 250 MILLIGRAM(S): at 06:09

## 2022-03-10 RX ADMIN — PANTOPRAZOLE SODIUM 40 MILLIGRAM(S): 20 TABLET, DELAYED RELEASE ORAL at 06:08

## 2022-03-10 RX ADMIN — OXYCODONE HYDROCHLORIDE 5 MILLIGRAM(S): 5 TABLET ORAL at 09:15

## 2022-03-10 RX ADMIN — PIPERACILLIN AND TAZOBACTAM 25 GRAM(S): 4; .5 INJECTION, POWDER, LYOPHILIZED, FOR SOLUTION INTRAVENOUS at 11:27

## 2022-03-10 RX ADMIN — OXYCODONE HYDROCHLORIDE 5 MILLIGRAM(S): 5 TABLET ORAL at 10:00

## 2022-03-10 RX ADMIN — DORNASE ALFA 2.5 MILLIGRAM(S): 1 SOLUTION RESPIRATORY (INHALATION) at 20:34

## 2022-03-10 RX ADMIN — HEPARIN SODIUM 5000 UNIT(S): 5000 INJECTION INTRAVENOUS; SUBCUTANEOUS at 18:26

## 2022-03-10 RX ADMIN — OXYCODONE HYDROCHLORIDE 5 MILLIGRAM(S): 5 TABLET ORAL at 09:00

## 2022-03-10 RX ADMIN — Medication 250 MILLIGRAM(S): at 17:48

## 2022-03-10 RX ADMIN — POLYETHYLENE GLYCOL 3350 17 GRAM(S): 17 POWDER, FOR SOLUTION ORAL at 11:52

## 2022-03-10 RX ADMIN — Medication 975 MILLIGRAM(S): at 20:41

## 2022-03-10 RX ADMIN — LIDOCAINE 1 PATCH: 4 CREAM TOPICAL at 11:52

## 2022-03-10 RX ADMIN — LIDOCAINE 1 PATCH: 4 CREAM TOPICAL at 23:10

## 2022-03-10 RX ADMIN — Medication 20 MILLIEQUIVALENT(S): at 08:26

## 2022-03-10 RX ADMIN — Medication 975 MILLIGRAM(S): at 12:25

## 2022-03-10 RX ADMIN — Medication 75 MICROGRAM(S): at 06:09

## 2022-03-10 RX ADMIN — DORNASE ALFA 2.5 MILLIGRAM(S): 1 SOLUTION RESPIRATORY (INHALATION) at 09:11

## 2022-03-10 RX ADMIN — OXYCODONE HYDROCHLORIDE 5 MILLIGRAM(S): 5 TABLET ORAL at 15:00

## 2022-03-11 LAB
ANION GAP SERPL CALC-SCNC: 12 MMOL/L — SIGNIFICANT CHANGE UP (ref 7–14)
BUN SERPL-MCNC: 8 MG/DL — SIGNIFICANT CHANGE UP (ref 7–23)
CALCIUM SERPL-MCNC: 8.8 MG/DL — SIGNIFICANT CHANGE UP (ref 8.4–10.5)
CHLORIDE SERPL-SCNC: 99 MMOL/L — SIGNIFICANT CHANGE UP (ref 98–107)
CO2 SERPL-SCNC: 23 MMOL/L — SIGNIFICANT CHANGE UP (ref 22–31)
CREAT SERPL-MCNC: 0.54 MG/DL — SIGNIFICANT CHANGE UP (ref 0.5–1.3)
EGFR: 99 ML/MIN/1.73M2 — SIGNIFICANT CHANGE UP
GLUCOSE SERPL-MCNC: 116 MG/DL — HIGH (ref 70–99)
HCT VFR BLD CALC: 29.4 % — LOW (ref 34.5–45)
HGB BLD-MCNC: 9.8 G/DL — LOW (ref 11.5–15.5)
MAGNESIUM SERPL-MCNC: 2.1 MG/DL — SIGNIFICANT CHANGE UP (ref 1.6–2.6)
MCHC RBC-ENTMCNC: 30.2 PG — SIGNIFICANT CHANGE UP (ref 27–34)
MCHC RBC-ENTMCNC: 33.3 GM/DL — SIGNIFICANT CHANGE UP (ref 32–36)
MCV RBC AUTO: 90.5 FL — SIGNIFICANT CHANGE UP (ref 80–100)
NRBC # BLD: 0 /100 WBCS — SIGNIFICANT CHANGE UP
NRBC # FLD: 0 K/UL — SIGNIFICANT CHANGE UP
PHOSPHATE SERPL-MCNC: 3.1 MG/DL — SIGNIFICANT CHANGE UP (ref 2.5–4.5)
PLATELET # BLD AUTO: 190 K/UL — SIGNIFICANT CHANGE UP (ref 150–400)
POTASSIUM SERPL-MCNC: 4.2 MMOL/L — SIGNIFICANT CHANGE UP (ref 3.5–5.3)
POTASSIUM SERPL-SCNC: 4.2 MMOL/L — SIGNIFICANT CHANGE UP (ref 3.5–5.3)
RBC # BLD: 3.25 M/UL — LOW (ref 3.8–5.2)
RBC # FLD: 12.4 % — SIGNIFICANT CHANGE UP (ref 10.3–14.5)
SARS-COV-2 RNA SPEC QL NAA+PROBE: SIGNIFICANT CHANGE UP
SODIUM SERPL-SCNC: 134 MMOL/L — LOW (ref 135–145)
VANCOMYCIN TROUGH SERPL-MCNC: 4.4 UG/ML — LOW (ref 10–20)
WBC # BLD: 9.99 K/UL — SIGNIFICANT CHANGE UP (ref 3.8–10.5)
WBC # FLD AUTO: 9.99 K/UL — SIGNIFICANT CHANGE UP (ref 3.8–10.5)

## 2022-03-11 PROCEDURE — 99233 SBSQ HOSP IP/OBS HIGH 50: CPT

## 2022-03-11 PROCEDURE — 71045 X-RAY EXAM CHEST 1 VIEW: CPT | Mod: 26

## 2022-03-11 RX ORDER — IPRATROPIUM/ALBUTEROL SULFATE 18-103MCG
3 AEROSOL WITH ADAPTER (GRAM) INHALATION EVERY 6 HOURS
Refills: 0 | Status: DISCONTINUED | OUTPATIENT
Start: 2022-03-11 | End: 2022-03-14

## 2022-03-11 RX ORDER — SODIUM CHLORIDE 9 MG/ML
4 INJECTION INTRAMUSCULAR; INTRAVENOUS; SUBCUTANEOUS EVERY 6 HOURS
Refills: 0 | Status: DISCONTINUED | OUTPATIENT
Start: 2022-03-11 | End: 2022-03-14

## 2022-03-11 RX ORDER — LACTULOSE 10 G/15ML
10 SOLUTION ORAL EVERY 8 HOURS
Refills: 0 | Status: DISCONTINUED | OUTPATIENT
Start: 2022-03-11 | End: 2022-03-16

## 2022-03-11 RX ORDER — VANCOMYCIN HCL 1 G
1000 VIAL (EA) INTRAVENOUS EVERY 12 HOURS
Refills: 0 | Status: DISCONTINUED | OUTPATIENT
Start: 2022-03-11 | End: 2022-03-12

## 2022-03-11 RX ORDER — MODAFINIL 200 MG/1
100 TABLET ORAL ONCE
Refills: 0 | Status: DISCONTINUED | OUTPATIENT
Start: 2022-03-11 | End: 2022-03-11

## 2022-03-11 RX ADMIN — HEPARIN SODIUM 5000 UNIT(S): 5000 INJECTION INTRAVENOUS; SUBCUTANEOUS at 18:29

## 2022-03-11 RX ADMIN — Medication 975 MILLIGRAM(S): at 11:59

## 2022-03-11 RX ADMIN — PIPERACILLIN AND TAZOBACTAM 25 GRAM(S): 4; .5 INJECTION, POWDER, LYOPHILIZED, FOR SOLUTION INTRAVENOUS at 18:30

## 2022-03-11 RX ADMIN — DORNASE ALFA 2.5 MILLIGRAM(S): 1 SOLUTION RESPIRATORY (INHALATION) at 09:27

## 2022-03-11 RX ADMIN — Medication 975 MILLIGRAM(S): at 13:00

## 2022-03-11 RX ADMIN — OXYCODONE HYDROCHLORIDE 5 MILLIGRAM(S): 5 TABLET ORAL at 10:00

## 2022-03-11 RX ADMIN — OXYCODONE HYDROCHLORIDE 5 MILLIGRAM(S): 5 TABLET ORAL at 18:52

## 2022-03-11 RX ADMIN — DORNASE ALFA 2.5 MILLIGRAM(S): 1 SOLUTION RESPIRATORY (INHALATION) at 21:04

## 2022-03-11 RX ADMIN — OXYCODONE HYDROCHLORIDE 5 MILLIGRAM(S): 5 TABLET ORAL at 12:40

## 2022-03-11 RX ADMIN — PIPERACILLIN AND TAZOBACTAM 25 GRAM(S): 4; .5 INJECTION, POWDER, LYOPHILIZED, FOR SOLUTION INTRAVENOUS at 00:09

## 2022-03-11 RX ADMIN — SODIUM CHLORIDE 4 MILLILITER(S): 9 INJECTION INTRAMUSCULAR; INTRAVENOUS; SUBCUTANEOUS at 15:33

## 2022-03-11 RX ADMIN — Medication 3 MILLILITER(S): at 15:33

## 2022-03-11 RX ADMIN — PIPERACILLIN AND TAZOBACTAM 25 GRAM(S): 4; .5 INJECTION, POWDER, LYOPHILIZED, FOR SOLUTION INTRAVENOUS at 06:02

## 2022-03-11 RX ADMIN — Medication 3 MILLILITER(S): at 09:27

## 2022-03-11 RX ADMIN — Medication 250 MILLIGRAM(S): at 08:50

## 2022-03-11 RX ADMIN — OXYCODONE HYDROCHLORIDE 5 MILLIGRAM(S): 5 TABLET ORAL at 09:24

## 2022-03-11 RX ADMIN — LACTULOSE 10 GRAM(S): 10 SOLUTION ORAL at 16:20

## 2022-03-11 RX ADMIN — SODIUM CHLORIDE 4 MILLILITER(S): 9 INJECTION INTRAMUSCULAR; INTRAVENOUS; SUBCUTANEOUS at 21:04

## 2022-03-11 RX ADMIN — Medication 75 MICROGRAM(S): at 06:16

## 2022-03-11 RX ADMIN — OXYCODONE HYDROCHLORIDE 5 MILLIGRAM(S): 5 TABLET ORAL at 22:14

## 2022-03-11 RX ADMIN — SENNA PLUS 2 TABLET(S): 8.6 TABLET ORAL at 22:03

## 2022-03-11 RX ADMIN — MODAFINIL 100 MILLIGRAM(S): 200 TABLET ORAL at 09:24

## 2022-03-11 RX ADMIN — OXYCODONE HYDROCHLORIDE 5 MILLIGRAM(S): 5 TABLET ORAL at 13:40

## 2022-03-11 RX ADMIN — LACTULOSE 10 GRAM(S): 10 SOLUTION ORAL at 22:03

## 2022-03-11 RX ADMIN — SODIUM CHLORIDE 4 MILLILITER(S): 9 INJECTION INTRAMUSCULAR; INTRAVENOUS; SUBCUTANEOUS at 09:27

## 2022-03-11 RX ADMIN — Medication 3 MILLILITER(S): at 21:03

## 2022-03-11 RX ADMIN — OXYCODONE HYDROCHLORIDE 5 MILLIGRAM(S): 5 TABLET ORAL at 19:04

## 2022-03-11 RX ADMIN — Medication 250 MILLIGRAM(S): at 18:30

## 2022-03-11 RX ADMIN — OXYCODONE HYDROCHLORIDE 5 MILLIGRAM(S): 5 TABLET ORAL at 06:16

## 2022-03-11 RX ADMIN — PIPERACILLIN AND TAZOBACTAM 25 GRAM(S): 4; .5 INJECTION, POWDER, LYOPHILIZED, FOR SOLUTION INTRAVENOUS at 12:38

## 2022-03-11 RX ADMIN — HEPARIN SODIUM 5000 UNIT(S): 5000 INJECTION INTRAVENOUS; SUBCUTANEOUS at 06:15

## 2022-03-11 RX ADMIN — OXYCODONE HYDROCHLORIDE 5 MILLIGRAM(S): 5 TABLET ORAL at 06:45

## 2022-03-11 RX ADMIN — OXYCODONE HYDROCHLORIDE 5 MILLIGRAM(S): 5 TABLET ORAL at 22:40

## 2022-03-11 RX ADMIN — PANTOPRAZOLE SODIUM 40 MILLIGRAM(S): 20 TABLET, DELAYED RELEASE ORAL at 06:16

## 2022-03-11 NOTE — PROGRESS NOTE ADULT - SUBJECTIVE AND OBJECTIVE BOX
CHIEF COMPLAINT: FOLLOW UP IN ICU FOR POSTOPERATIVE CARE OF PATIENT WHO IS S/P LUNG RESECTION      PROCEDURES: right robot-assisted VATS superior segmentectomy with MLND (level 7, 8, 9, 10, 11) 07-Mar-2022     ISSUES:   Lung nodule  Post op pain  Chest tube in place  RLL PNA  S/P Lobectomy of L lung  Hypothyroidism  Hx of melanoma s/p resection  Hx of cervical cancer   Anxiety  Depression      INTERVAL EVENTS:     Febrile to 101 last evening, still unable to cough up secretions. Ambulating in ICU with spo2 >95% on RA, OOB to chair, continue on mucolytics, bronchodilators.  Day 3 ABX today, negative blood cultures so far, no sputum sample sent.  Tolerating diet, Last BM 5 days ago      HISTORY:   Patient reports moderate pain at chest wall incision sites which is worse with coughing and deep breathing without associated fever or dyspnea. Pain is improved with use of pain meds.     PHYSICAL EXAM:   Gen: Comfortable, No acute distress  Eyes: Sclera white, Conjunctiva normal, Eyelids normal, Pupils symmetrical   ENT: Mucous membranes moist,  ,  ,    Neck: Trachea midline,  ,  ,  ,  ,  ,    CV: Rate regular, Rhythm regular,  ,  ,    Resp: Breath sounds clear, No accessory muscles use  Abd: Soft, Non-distended, Non-tender, Bowel sounds normal,  ,  ,    Skin: Warm, No peripheral edema of lower extremities,  ,    : No  Butler in place  Neuro: Moving all 4 extremities,    Psych: A&Ox3      ASSESSMENT AND PLAN:     NEURO:  Post-operative Pain - Stable. Pain control with oral oxycodone and Tylenol IV PRN.          RESPIRATORY:  Hypoxia - Wean nasal cannula for goal O2sat above 92. Obtain CXR. Incentive spirometry. Chest PT and frequent suctioning. Continue bronchodilators. OOB to chair & ambulate w/ assistance. Continuous pulse oximetry for support & to prevent decompensation.     Continue vanco/zosyn. F/u cultures, send sputum for cultures when able to get sample. Vanco dose increased.      CARDIOVASCULAR:  Hemodynamically stable - Not on pressors. Continue hemodynamic monitoring.  Telemetry (medical test) - Reviewed by me today independently. Normal sinus rhythm.           RENAL:  Stable - Monitor IOs and electrolytes. Keep K above 4.0 and Mg above 2.0.        GASTROINTESTINAL:  GI prophylaxis not indicated  Zofran and Reglan IV PRN for nausea  Regular consistency diet        HEMATOLOGIC:  No signs of active bleeding. Monitor Hgb in CBC in AM  DVT prophylaxis with heparin subQ and SCDs.       INFECTIOUS DISEASE:  All surgical sites appear clean. vanco/zosyn, check sputum cultures.  Will monitor for fever and leukocytosis. No allergic reaction to zosyn.          ENDOCRINE:  Stable – Monitor glucose fingersticks for goal 120-180.  Hypothyroidism - stable. Continue Synthroid.          ONCOLOGY:  Lung nodule - Improved. S/P resection. Follow up final pathology.      Pertinent clinical, laboratory, radiographic, hemodynamic, echocardiographic, respiratory data, microbiologic data and chart were reviewed by myself and analyzed frequently throughout the course of the day and night by myself.    Plan discussed at length with the CTICU staff and Attending CT Surgeon -   Dr Ross Escobar.    Patient's status was discussed with patient at bedside.    _________________________  VITAL SIGNS:  Vital Signs Last 24 Hrs  T(C): 37.1 (11 Mar 2022 08:00), Max: 38.5 (10 Mar 2022 18:00)  T(F): 98.8 (11 Mar 2022 08:00), Max: 101.3 (10 Mar 2022 18:00)  HR: 82 (11 Mar 2022 09:27) (64 - 91)  BP: 145/69 (11 Mar 2022 09:00) (102/46 - 145/69)  BP(mean): 87 (11 Mar 2022 09:00) (59 - 104)  RR: 18 (11 Mar 2022 09:00) (17 - 26)  SpO2: 99% (11 Mar 2022 09:00) (92% - 100%)  I/Os:   I&O's Detail    10 Mar 2022 07:01  -  11 Mar 2022 07:00  --------------------------------------------------------  IN:    IV PiggyBack: 650 mL    Oral Fluid: 290 mL  Total IN: 940 mL    OUT:    Voided (mL): 2850 mL  Total OUT: 2850 mL    Total NET: -1910 mL      11 Mar 2022 07:01  -  11 Mar 2022 10:45  --------------------------------------------------------  IN:    IV PiggyBack: 250 mL    Oral Fluid: 240 mL  Total IN: 490 mL    OUT:  Total OUT: 0 mL    Total NET: 490 mL              MEDICATIONS:  MEDICATIONS  (STANDING):  albuterol/ipratropium for Nebulization 3 milliLiter(s) Nebulizer every 6 hours  dornase beau Solution 2.5 milliGRAM(s) Inhalation two times a day  heparin   Injectable 5000 Unit(s) SubCutaneous every 12 hours  lactulose Syrup 10 Gram(s) Oral every 8 hours  levothyroxine 75 MICROGram(s) Oral daily  lidocaine   4% Patch 1 Patch Transdermal daily  modafinil 100 milliGRAM(s) Oral <User Schedule>  modafinil 200 milliGRAM(s) Oral <User Schedule>  pantoprazole    Tablet 40 milliGRAM(s) Oral before breakfast  piperacillin/tazobactam IVPB.. 3.375 Gram(s) IV Intermittent every 6 hours  polyethylene glycol 3350 17 Gram(s) Oral daily  senna 2 Tablet(s) Oral at bedtime  sodium chloride 3%  Inhalation 4 milliLiter(s) Inhalation every 6 hours  vancomycin  IVPB 1000 milliGRAM(s) IV Intermittent every 12 hours    MEDICATIONS  (PRN):  acetaminophen     Tablet .. 975 milliGRAM(s) Oral every 6 hours PRN Temp greater or equal to 38C (100.4F)  metoclopramide Injectable 10 milliGRAM(s) IV Push every 8 hours PRN nausea or vomiting not controlled by zofran  naloxone Injectable 0.1 milliGRAM(s) IV Push every 3 minutes PRN For ANY of the following changes in patient status:  A. RR LESS THAN 10 breaths per minute, B. Oxygen saturation LESS THAN 90%, C. Sedation score of 6  ondansetron Injectable 4 milliGRAM(s) IV Push every 6 hours PRN Nausea  oxyCODONE    IR 5 milliGRAM(s) Oral every 3 hours PRN Severe Pain (7 - 10)  oxyCODONE    IR 2.5 milliGRAM(s) Oral every 3 hours PRN Moderate Pain (4 - 6)      LABS:  Laboratory data was independently reviewed by me today.                           9.8    9.99  )-----------( 190      ( 11 Mar 2022 05:49 )             29.4     03-11    134<L>  |  99  |  8   ----------------------------<  116<H>  4.2   |  23  |  0.54    Ca    8.8      11 Mar 2022 05:49  Phos  3.1     03-11  Mg     2.10     03-11                RADIOLOGY:   Radiology images were independently reviewed by me today. Reports were reviewed by me today.    Xray Chest 1 View- PORTABLE-Routine:   ACC: [id number] EXAM:  XR CHEST PORTABLE ROUTINE 1V                          PROCEDURE DATE:  03/10/2022          INTERPRETATION:  TIME OF EXAM: March 10, 2022 at 5:00 AM    CLINICAL INFORMATION: Postop    TECHNIQUE:   Portable chest    INTERPRETATION:    Right paracardiac opacity again seen felt to represent pericardial cyst   or diverticulum on most recent chest CT.     Persistent haziness at the right lung base could represent layering   effusion versus lower lobe pneumonia.    Unchanged from the study of the day before may represent pneumonia.   Remainder of the lungs are clear and the heart  size is stable. No   pneumothorax.      COMPARISON:  March 9, 2022      IMPRESSION:  Postop hazy right lung base either pneumonia versus layering   effusion.    --- End of Report ---            NESTOR HORTON MD; Attending Radiologist  This document has been electronically signed. Mar 10 2022  6:47AM (03-10-22 @ 05:47)  Xray Chest 1 View- PORTABLE-Routine:   ACC: [id number] EXAM:  XR CHEST PORTABLE ROUTINE 1V                          PROCEDURE DATE:  03/09/2022          INTERPRETATION:  TIME OF EXAM: March 9, 2022 at 6:07 AM    CLINICAL INFORMATION: Postop follow-up    TECHNIQUE:   Portable chest    INTERPRETATION:    Again seen is a consolidation involving the right lower lobe may be   secondary to aspiration. Upper lobes are clear bilaterally. The heart is   not enlarged. Tiny right apical pneumothorax.      COMPARISON:  March 8      IMPRESSION:  Follow-up showing no significant change with right lower   lobe consolidation and right apical pneumothorax.    --- End of Report ---            NESTOR HORTON MD; Attending Radiologist  This document has been electronically signed. Mar  9 2022 10:58AM (03-09-22 @ 06:56)  Xray Chest 1 View- PORTABLE-Urgent:   ACC: [id number] EXAM:  XR CHEST PORTABLE URGENT 1V                          PROCEDURE DATE:  03/08/2022          INTERPRETATION:  INDICATION: Chest tube removal    COMPARISON: 3/8/2022 at 5:29 AM    IMPRESSION: There is a small right pneumothorax status post removal of   right-sided chest tube. There is a stable right lower lobe consolidation.   There is subcutaneous emphysema in the right chest wall as was seen   previously.            --- End of Report ---            FITO CELAYA MD; Attending Radiologist  This document has been electronically signed. Mar  8 2022  5:17PM (03-08-22 @ 15:43)

## 2022-03-12 ENCOUNTER — TRANSCRIPTION ENCOUNTER (OUTPATIENT)
Age: 72
End: 2022-03-12

## 2022-03-12 LAB
ANION GAP SERPL CALC-SCNC: 15 MMOL/L — HIGH (ref 7–14)
BUN SERPL-MCNC: 7 MG/DL — SIGNIFICANT CHANGE UP (ref 7–23)
CALCIUM SERPL-MCNC: 9.4 MG/DL — SIGNIFICANT CHANGE UP (ref 8.4–10.5)
CHLORIDE SERPL-SCNC: 94 MMOL/L — LOW (ref 98–107)
CO2 SERPL-SCNC: 24 MMOL/L — SIGNIFICANT CHANGE UP (ref 22–31)
CREAT SERPL-MCNC: 0.53 MG/DL — SIGNIFICANT CHANGE UP (ref 0.5–1.3)
CULTURE RESULTS: SIGNIFICANT CHANGE UP
EGFR: 99 ML/MIN/1.73M2 — SIGNIFICANT CHANGE UP
GLUCOSE SERPL-MCNC: 123 MG/DL — HIGH (ref 70–99)
HCT VFR BLD CALC: 31 % — LOW (ref 34.5–45)
HGB BLD-MCNC: 10.1 G/DL — LOW (ref 11.5–15.5)
MAGNESIUM SERPL-MCNC: 2.2 MG/DL — SIGNIFICANT CHANGE UP (ref 1.6–2.6)
MCHC RBC-ENTMCNC: 29.9 PG — SIGNIFICANT CHANGE UP (ref 27–34)
MCHC RBC-ENTMCNC: 32.6 GM/DL — SIGNIFICANT CHANGE UP (ref 32–36)
MCV RBC AUTO: 91.7 FL — SIGNIFICANT CHANGE UP (ref 80–100)
NRBC # BLD: 0 /100 WBCS — SIGNIFICANT CHANGE UP
NRBC # FLD: 0 K/UL — SIGNIFICANT CHANGE UP
PHOSPHATE SERPL-MCNC: 3.5 MG/DL — SIGNIFICANT CHANGE UP (ref 2.5–4.5)
PLATELET # BLD AUTO: 224 K/UL — SIGNIFICANT CHANGE UP (ref 150–400)
POTASSIUM SERPL-MCNC: 4 MMOL/L — SIGNIFICANT CHANGE UP (ref 3.5–5.3)
POTASSIUM SERPL-SCNC: 4 MMOL/L — SIGNIFICANT CHANGE UP (ref 3.5–5.3)
RBC # BLD: 3.38 M/UL — LOW (ref 3.8–5.2)
RBC # FLD: 12.5 % — SIGNIFICANT CHANGE UP (ref 10.3–14.5)
SODIUM SERPL-SCNC: 133 MMOL/L — LOW (ref 135–145)
SPECIMEN SOURCE: SIGNIFICANT CHANGE UP
WBC # BLD: 9.33 K/UL — SIGNIFICANT CHANGE UP (ref 3.8–10.5)
WBC # FLD AUTO: 9.33 K/UL — SIGNIFICANT CHANGE UP (ref 3.8–10.5)

## 2022-03-12 PROCEDURE — 99233 SBSQ HOSP IP/OBS HIGH 50: CPT

## 2022-03-12 PROCEDURE — 71045 X-RAY EXAM CHEST 1 VIEW: CPT | Mod: 26

## 2022-03-12 RX ORDER — KETOROLAC TROMETHAMINE 30 MG/ML
15 SYRINGE (ML) INJECTION ONCE
Refills: 0 | Status: DISCONTINUED | OUTPATIENT
Start: 2022-03-12 | End: 2022-03-12

## 2022-03-12 RX ADMIN — ONDANSETRON 4 MILLIGRAM(S): 8 TABLET, FILM COATED ORAL at 09:30

## 2022-03-12 RX ADMIN — HEPARIN SODIUM 5000 UNIT(S): 5000 INJECTION INTRAVENOUS; SUBCUTANEOUS at 17:13

## 2022-03-12 RX ADMIN — Medication 1 TABLET(S): at 17:13

## 2022-03-12 RX ADMIN — ONDANSETRON 4 MILLIGRAM(S): 8 TABLET, FILM COATED ORAL at 17:15

## 2022-03-12 RX ADMIN — Medication 3 MILLILITER(S): at 23:30

## 2022-03-12 RX ADMIN — MODAFINIL 200 MILLIGRAM(S): 200 TABLET ORAL at 10:10

## 2022-03-12 RX ADMIN — SODIUM CHLORIDE 4 MILLILITER(S): 9 INJECTION INTRAMUSCULAR; INTRAVENOUS; SUBCUTANEOUS at 04:15

## 2022-03-12 RX ADMIN — Medication 3 MILLILITER(S): at 04:15

## 2022-03-12 RX ADMIN — LACTULOSE 10 GRAM(S): 10 SOLUTION ORAL at 13:16

## 2022-03-12 RX ADMIN — Medication 3 MILLILITER(S): at 09:18

## 2022-03-12 RX ADMIN — Medication 15 MILLIGRAM(S): at 11:10

## 2022-03-12 RX ADMIN — POLYETHYLENE GLYCOL 3350 17 GRAM(S): 17 POWDER, FOR SOLUTION ORAL at 11:02

## 2022-03-12 RX ADMIN — Medication 3 MILLILITER(S): at 15:33

## 2022-03-12 RX ADMIN — PIPERACILLIN AND TAZOBACTAM 25 GRAM(S): 4; .5 INJECTION, POWDER, LYOPHILIZED, FOR SOLUTION INTRAVENOUS at 05:28

## 2022-03-12 RX ADMIN — OXYCODONE HYDROCHLORIDE 5 MILLIGRAM(S): 5 TABLET ORAL at 10:00

## 2022-03-12 RX ADMIN — Medication 75 MICROGRAM(S): at 05:29

## 2022-03-12 RX ADMIN — OXYCODONE HYDROCHLORIDE 5 MILLIGRAM(S): 5 TABLET ORAL at 09:30

## 2022-03-12 RX ADMIN — SENNA PLUS 2 TABLET(S): 8.6 TABLET ORAL at 21:39

## 2022-03-12 RX ADMIN — OXYCODONE HYDROCHLORIDE 5 MILLIGRAM(S): 5 TABLET ORAL at 16:38

## 2022-03-12 RX ADMIN — Medication 975 MILLIGRAM(S): at 18:59

## 2022-03-12 RX ADMIN — OXYCODONE HYDROCHLORIDE 5 MILLIGRAM(S): 5 TABLET ORAL at 05:20

## 2022-03-12 RX ADMIN — PANTOPRAZOLE SODIUM 40 MILLIGRAM(S): 20 TABLET, DELAYED RELEASE ORAL at 06:02

## 2022-03-12 RX ADMIN — PIPERACILLIN AND TAZOBACTAM 25 GRAM(S): 4; .5 INJECTION, POWDER, LYOPHILIZED, FOR SOLUTION INTRAVENOUS at 00:39

## 2022-03-12 RX ADMIN — HEPARIN SODIUM 5000 UNIT(S): 5000 INJECTION INTRAVENOUS; SUBCUTANEOUS at 05:29

## 2022-03-12 RX ADMIN — SODIUM CHLORIDE 4 MILLILITER(S): 9 INJECTION INTRAMUSCULAR; INTRAVENOUS; SUBCUTANEOUS at 15:38

## 2022-03-12 RX ADMIN — SODIUM CHLORIDE 4 MILLILITER(S): 9 INJECTION INTRAMUSCULAR; INTRAVENOUS; SUBCUTANEOUS at 09:18

## 2022-03-12 RX ADMIN — OXYCODONE HYDROCHLORIDE 5 MILLIGRAM(S): 5 TABLET ORAL at 21:00

## 2022-03-12 RX ADMIN — OXYCODONE HYDROCHLORIDE 5 MILLIGRAM(S): 5 TABLET ORAL at 16:08

## 2022-03-12 RX ADMIN — OXYCODONE HYDROCHLORIDE 5 MILLIGRAM(S): 5 TABLET ORAL at 23:31

## 2022-03-12 RX ADMIN — OXYCODONE HYDROCHLORIDE 5 MILLIGRAM(S): 5 TABLET ORAL at 20:01

## 2022-03-12 RX ADMIN — DORNASE ALFA 2.5 MILLIGRAM(S): 1 SOLUTION RESPIRATORY (INHALATION) at 23:43

## 2022-03-12 RX ADMIN — DORNASE ALFA 2.5 MILLIGRAM(S): 1 SOLUTION RESPIRATORY (INHALATION) at 09:18

## 2022-03-12 RX ADMIN — SODIUM CHLORIDE 4 MILLILITER(S): 9 INJECTION INTRAMUSCULAR; INTRAVENOUS; SUBCUTANEOUS at 23:30

## 2022-03-12 RX ADMIN — OXYCODONE HYDROCHLORIDE 5 MILLIGRAM(S): 5 TABLET ORAL at 06:02

## 2022-03-12 RX ADMIN — LACTULOSE 10 GRAM(S): 10 SOLUTION ORAL at 21:39

## 2022-03-12 RX ADMIN — LACTULOSE 10 GRAM(S): 10 SOLUTION ORAL at 05:29

## 2022-03-12 RX ADMIN — Medication 250 MILLIGRAM(S): at 05:28

## 2022-03-12 NOTE — PROGRESS NOTE ADULT - SUBJECTIVE AND OBJECTIVE BOX
THOR MANASA      71y   Female   MRN-[id number]         penicillin (Unknown)             Daily     Daily Drug Dosing Weight  Height (cm): 161.9 (07 Mar 2022 05:52)  Weight (kg): 55.3 (07 Mar 2022 05:52)  BMI (kg/m2): 21.1 (07 Mar 2022 05:52)  BSA (m2): 1.58 (07 Mar 2022 05:52)      Pt is a 71 yr old female scheduled for Robotic Assisted Right Video Assisted Thoracoscopy Right Superior Segmentectomy with Dr Escobar tentatively 3/7/22  - Pt hx malignant melanoma removed right flank 2/21 and CT noted RLL ground glass opacity. 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 Further CT scan noted solid and ground glass pulmonary nodules. Pt also noted to have poss pericardial diverticulum on CT scan 11/21 - pt to have Cardiac MRI tomorrow to further evaluate. Pt denies SOB or CP but admits to extreme fatigue for many years and diffuse joint / muscle pains that come and go. hx Hypothyroid and depression/anxiety - pt cancelled surgery scheduled 1/25/22 because of extreme anxiety     Pt instructed to contact surgeon's office concerning COVID test prior to surgery  (18 Feb 2022 15:08)    Procedure: right robot-assisted VATS superior segmentectomy with MLND (level 7, 8, 9, 10, 11) 07-Mar-2022                        Issues:              RLL Pneumonia / Atelectasis  Lung nodule              Postop pain              S/P Lobectomy of L lung  Hypothyroidism  Hx of melanoma s/p resection  Hx of cervical cancer   Anxiety  Depression  Chronic fatigue syndrome                Home Medications:  armodafinil 150 mg oral tablet: 1 tab(s) orally once a day (07 Mar 2022 06:21)  armodafinil 50 mg oral tablet: 2 tab(s) orally once a day (07 Mar 2022 06:21)  levothyroxine 75 mcg (0.075 mg) oral tablet: 1 tab(s) orally once a day (07 Mar 2022 06:21)  vitamin C: OTC supplement  (07 Mar 2022 06:21)  vitamin D: OTC supplement  (07 Mar 2022 06:21)  zinc: OTC supplement  (07 Mar 2022 06:21)    PAST MEDICAL & SURGICAL HISTORY:  Depression    Anxiety    Mononucleosis    Lyme disease    Hypothyroid    Vertigo    Chronic fatigue syndrome    Pulmonary nodule    Uterine fibroid    Cervical ca    Malignant melanoma    History of thoracotomy    Malignant melanoma  2021 - removed right back    H/O cone biopsy of cervix      Vital Signs Last 24 Hrs  T(C): 36.7 (12 Mar 2022 04:00), Max: 37.3 (11 Mar 2022 12:00)  T(F): 98 (12 Mar 2022 04:00), Max: 99.1 (11 Mar 2022 12:00)  HR: 71 (12 Mar 2022 09:00) (58 - 88)  BP: 120/53 (12 Mar 2022 09:00) (93/47 - 149/64)  BP(mean): 69 (12 Mar 2022 09:00) (57 - 94)  RR: 17 (12 Mar 2022 09:00) (12 - 25)  SpO2: 97% (12 Mar 2022 09:00) (93% - 99%)  I&O's Detail    11 Mar 2022 07:01  -  12 Mar 2022 07:00  --------------------------------------------------------  IN:    IV PiggyBack: 800 mL    Oral Fluid: 780 mL  Total IN: 1580 mL    OUT:    Voided (mL): 3000 mL  Total OUT: 3000 mL    Total NET: -1420 mL        CAPILLARY BLOOD GLUCOSE        Home Medications:  armodafinil 150 mg oral tablet: 1 tab(s) orally once a day (07 Mar 2022 06:21)  armodafinil 50 mg oral tablet: 2 tab(s) orally once a day (07 Mar 2022 06:21)  levothyroxine 75 mcg (0.075 mg) oral tablet: 1 tab(s) orally once a day (07 Mar 2022 06:21)  vitamin C: OTC supplement  (07 Mar 2022 06:21)  vitamin D: OTC supplement  (07 Mar 2022 06:21)  zinc: OTC supplement  (07 Mar 2022 06:21)    MEDICATIONS  (STANDING):  albuterol/ipratropium for Nebulization 3 milliLiter(s) Nebulizer every 6 hours  dornase beau Solution 2.5 milliGRAM(s) Inhalation two times a day  heparin   Injectable 5000 Unit(s) SubCutaneous every 12 hours  lactulose Syrup 10 Gram(s) Oral every 8 hours  levothyroxine 75 MICROGram(s) Oral daily  lidocaine   4% Patch 1 Patch Transdermal daily  modafinil 100 milliGRAM(s) Oral <User Schedule>  modafinil 200 milliGRAM(s) Oral <User Schedule>  pantoprazole    Tablet 40 milliGRAM(s) Oral before breakfast  piperacillin/tazobactam IVPB.. 3.375 Gram(s) IV Intermittent every 6 hours  polyethylene glycol 3350 17 Gram(s) Oral daily  senna 2 Tablet(s) Oral at bedtime  sodium chloride 3%  Inhalation 4 milliLiter(s) Inhalation every 6 hours  vancomycin  IVPB 1000 milliGRAM(s) IV Intermittent every 12 hours    MEDICATIONS  (PRN):  acetaminophen     Tablet .. 975 milliGRAM(s) Oral every 6 hours PRN Temp greater or equal to 38C (100.4F)  metoclopramide Injectable 10 milliGRAM(s) IV Push every 8 hours PRN nausea or vomiting not controlled by zofran  naloxone Injectable 0.1 milliGRAM(s) IV Push every 3 minutes PRN For ANY of the following changes in patient status:  A. RR LESS THAN 10 breaths per minute, B. Oxygen saturation LESS THAN 90%, C. Sedation score of 6  ondansetron Injectable 4 milliGRAM(s) IV Push every 6 hours PRN Nausea  oxyCODONE    IR 5 milliGRAM(s) Oral every 3 hours PRN Severe Pain (7 - 10)  oxyCODONE    IR 2.5 milliGRAM(s) Oral every 3 hours PRN Moderate Pain (4 - 6)        Physical exam:   General:               Pt is awake, alert,  appears to be in pain but not in  distress                                                  Neuro:                  Nonfocal                             Cardiovascular:   S1 & S2, regular                           Respiratory:         Air entry is fair and equal on both sides, has  conducted sounds at right base                          GI:                          Soft, nondistended and nontender, Bowel sounds active                            Ext:                        No cyanosis or edema                            Labs:                                                                           10.1   9.33  )-----------( 224      ( 12 Mar 2022 05:37 )             31.0             03-12    133<L>  |  94<L>  |  7   ----------------------------<  123<H>  4.0   |  24  |  0.53    Ca    9.4      12 Mar 2022 05:37  Phos  3.5     03-12  Mg     2.20     03-12                    Culture - Sputum (collected 10 Mar 2022 12:10)  Source: .Sputum Sputum  Gram Stain (10 Mar 2022 15:01):    Few polymorphonuclear leukocytes per low power field    Few Squamous epithelial cells per low power field    Few Gram Negative Rods per oil power field    Few Gram Positive Rods per oil power field    Few Gram positive cocci in pairs, chains and clusters per oil power field  Preliminary Report (11 Mar 2022 07:57):    Normal Respiratory Lulu present    Culture - Blood (collected 10 Mar 2022 10:54)  Source: .Blood Blood  Preliminary Report (11 Mar 2022 11:01):    No growth to date.        CXR:  < from: Xray Chest 1 View- PORTABLE-Routine (Xray Chest 1 View- PORTABLE-Routine in AM.) (03.11.22 @ 07:05) >  Moderate size right pleural effusion again seen likely with underlying   atelectasis about the same as the study of the day before. Trace left   effusion. Visualized lungs are clear. No pneumothorax.      COMPARISON:  March 10    IMPRESSION:  Right moderate-sized effusion with underlying atelectasis.      Plan:  General: 71yFemale s/p right robot-assisted VATS superior segmentectomy with MLND (level 7, 8, 9, 10, 11) 07-Mar-2022 , experiencing  pain with deep breathing.                             Neuro:                                         Pain control with Oxy /  Tylenol PRN    Chronic fatigue syndrome; Restarted modafinil                            Cardiovascular:                                          Continue hemodynamic monitoring.                            Respiratory:                                         Pt is on RA                                         Comfortable, not in any distress.                                         Encourage incentive spirometry                                          RLL Pneumonia: Switch antibiotics to Augmentin  cultures -  NGTD                                                                Continue bronchodilators, pulmonary toilet- PRN                            GI                                         On  regular diet as tolerated.                                          Continue Zofran / Reglan for nausea - PRN                                         Continue bowel regimen	                                                                 Renal:                                                                                  Monitor I/Os and electrolytes                                                                                        Hem/ Onc:                                         DVT prophylaxis with SQ Heparin and SCDs                                         Monitor for signs of bleeding.                                          Follow CBC in AM                           Infectious disease:     RLL Pneumonia: On Vanco and Zosyn, Switch antibiotics to Augmentin  Cultures - NGTD                                          Monitor for fever / leukocytosis.                                          All surgical incision / chest tube  sites look clean                            Endocrine      Hypothyroidism: Continue Synthroid                                          Continue Accu-Checks with coverage       Pertinent clinical, laboratory, radiographic, hemodynamic, echocardiographic, respiratory data, microbiologic data and chart were reviewed and analyzed frequently throughout the course of the day and night  Patient seen, examined and plan discussed with CT Surgeon Dr. Escobar  / CTICU team during rounds.    OOB to chair and ambulate as tolerated.     Status discussed with patient /  patient's family and updated plan of care    I have spent 40 minutes with this patient including 20 minutes of time coordinating care, updating patient family.          Barry Lee MD

## 2022-03-13 LAB
ANION GAP SERPL CALC-SCNC: 11 MMOL/L — SIGNIFICANT CHANGE UP (ref 7–14)
BUN SERPL-MCNC: 7 MG/DL — SIGNIFICANT CHANGE UP (ref 7–23)
CALCIUM SERPL-MCNC: 9.5 MG/DL — SIGNIFICANT CHANGE UP (ref 8.4–10.5)
CHLORIDE SERPL-SCNC: 94 MMOL/L — LOW (ref 98–107)
CO2 SERPL-SCNC: 27 MMOL/L — SIGNIFICANT CHANGE UP (ref 22–31)
CREAT SERPL-MCNC: 0.52 MG/DL — SIGNIFICANT CHANGE UP (ref 0.5–1.3)
EGFR: 100 ML/MIN/1.73M2 — SIGNIFICANT CHANGE UP
GLUCOSE SERPL-MCNC: 110 MG/DL — HIGH (ref 70–99)
HCT VFR BLD CALC: 28.8 % — LOW (ref 34.5–45)
HGB BLD-MCNC: 9.8 G/DL — LOW (ref 11.5–15.5)
MAGNESIUM SERPL-MCNC: 2.2 MG/DL — SIGNIFICANT CHANGE UP (ref 1.6–2.6)
MCHC RBC-ENTMCNC: 30.6 PG — SIGNIFICANT CHANGE UP (ref 27–34)
MCHC RBC-ENTMCNC: 34 GM/DL — SIGNIFICANT CHANGE UP (ref 32–36)
MCV RBC AUTO: 90 FL — SIGNIFICANT CHANGE UP (ref 80–100)
NRBC # BLD: 0 /100 WBCS — SIGNIFICANT CHANGE UP
NRBC # FLD: 0 K/UL — SIGNIFICANT CHANGE UP
PHOSPHATE SERPL-MCNC: 4 MG/DL — SIGNIFICANT CHANGE UP (ref 2.5–4.5)
PLATELET # BLD AUTO: 265 K/UL — SIGNIFICANT CHANGE UP (ref 150–400)
POTASSIUM SERPL-MCNC: 4.4 MMOL/L — SIGNIFICANT CHANGE UP (ref 3.5–5.3)
POTASSIUM SERPL-SCNC: 4.4 MMOL/L — SIGNIFICANT CHANGE UP (ref 3.5–5.3)
RBC # BLD: 3.2 M/UL — LOW (ref 3.8–5.2)
RBC # FLD: 12.3 % — SIGNIFICANT CHANGE UP (ref 10.3–14.5)
SODIUM SERPL-SCNC: 132 MMOL/L — LOW (ref 135–145)
WBC # BLD: 6.65 K/UL — SIGNIFICANT CHANGE UP (ref 3.8–10.5)
WBC # FLD AUTO: 6.65 K/UL — SIGNIFICANT CHANGE UP (ref 3.8–10.5)

## 2022-03-13 PROCEDURE — 71045 X-RAY EXAM CHEST 1 VIEW: CPT | Mod: 26

## 2022-03-13 RX ORDER — IBUPROFEN 200 MG
400 TABLET ORAL EVERY 8 HOURS
Refills: 0 | Status: DISCONTINUED | OUTPATIENT
Start: 2022-03-13 | End: 2022-03-16

## 2022-03-13 RX ADMIN — SODIUM CHLORIDE 4 MILLILITER(S): 9 INJECTION INTRAMUSCULAR; INTRAVENOUS; SUBCUTANEOUS at 22:08

## 2022-03-13 RX ADMIN — MODAFINIL 200 MILLIGRAM(S): 200 TABLET ORAL at 09:10

## 2022-03-13 RX ADMIN — HEPARIN SODIUM 5000 UNIT(S): 5000 INJECTION INTRAVENOUS; SUBCUTANEOUS at 06:02

## 2022-03-13 RX ADMIN — Medication 10 MILLIGRAM(S): at 16:49

## 2022-03-13 RX ADMIN — Medication 975 MILLIGRAM(S): at 06:02

## 2022-03-13 RX ADMIN — Medication 10 MILLIGRAM(S): at 06:19

## 2022-03-13 RX ADMIN — OXYCODONE HYDROCHLORIDE 5 MILLIGRAM(S): 5 TABLET ORAL at 00:10

## 2022-03-13 RX ADMIN — Medication 400 MILLIGRAM(S): at 21:37

## 2022-03-13 RX ADMIN — OXYCODONE HYDROCHLORIDE 5 MILLIGRAM(S): 5 TABLET ORAL at 06:03

## 2022-03-13 RX ADMIN — LIDOCAINE 1 PATCH: 4 CREAM TOPICAL at 12:29

## 2022-03-13 RX ADMIN — LACTULOSE 10 GRAM(S): 10 SOLUTION ORAL at 13:26

## 2022-03-13 RX ADMIN — LIDOCAINE 1 PATCH: 4 CREAM TOPICAL at 22:15

## 2022-03-13 RX ADMIN — Medication 400 MILLIGRAM(S): at 15:00

## 2022-03-13 RX ADMIN — OXYCODONE HYDROCHLORIDE 5 MILLIGRAM(S): 5 TABLET ORAL at 09:39

## 2022-03-13 RX ADMIN — SODIUM CHLORIDE 4 MILLILITER(S): 9 INJECTION INTRAMUSCULAR; INTRAVENOUS; SUBCUTANEOUS at 05:36

## 2022-03-13 RX ADMIN — OXYCODONE HYDROCHLORIDE 5 MILLIGRAM(S): 5 TABLET ORAL at 06:49

## 2022-03-13 RX ADMIN — LACTULOSE 10 GRAM(S): 10 SOLUTION ORAL at 06:02

## 2022-03-13 RX ADMIN — PANTOPRAZOLE SODIUM 40 MILLIGRAM(S): 20 TABLET, DELAYED RELEASE ORAL at 06:19

## 2022-03-13 RX ADMIN — Medication 1 TABLET(S): at 06:03

## 2022-03-13 RX ADMIN — DORNASE ALFA 2.5 MILLIGRAM(S): 1 SOLUTION RESPIRATORY (INHALATION) at 22:07

## 2022-03-13 RX ADMIN — DORNASE ALFA 2.5 MILLIGRAM(S): 1 SOLUTION RESPIRATORY (INHALATION) at 08:16

## 2022-03-13 RX ADMIN — OXYCODONE HYDROCHLORIDE 5 MILLIGRAM(S): 5 TABLET ORAL at 13:26

## 2022-03-13 RX ADMIN — ONDANSETRON 4 MILLIGRAM(S): 8 TABLET, FILM COATED ORAL at 00:08

## 2022-03-13 RX ADMIN — SENNA PLUS 2 TABLET(S): 8.6 TABLET ORAL at 21:37

## 2022-03-13 RX ADMIN — OXYCODONE HYDROCHLORIDE 5 MILLIGRAM(S): 5 TABLET ORAL at 09:03

## 2022-03-13 RX ADMIN — Medication 1 TABLET(S): at 18:29

## 2022-03-13 RX ADMIN — POLYETHYLENE GLYCOL 3350 17 GRAM(S): 17 POWDER, FOR SOLUTION ORAL at 12:29

## 2022-03-13 RX ADMIN — Medication 3 MILLILITER(S): at 05:36

## 2022-03-13 RX ADMIN — Medication 3 MILLILITER(S): at 22:06

## 2022-03-13 RX ADMIN — OXYCODONE HYDROCHLORIDE 5 MILLIGRAM(S): 5 TABLET ORAL at 14:02

## 2022-03-13 RX ADMIN — HEPARIN SODIUM 5000 UNIT(S): 5000 INJECTION INTRAVENOUS; SUBCUTANEOUS at 18:29

## 2022-03-13 RX ADMIN — ONDANSETRON 4 MILLIGRAM(S): 8 TABLET, FILM COATED ORAL at 14:50

## 2022-03-13 RX ADMIN — Medication 3 MILLILITER(S): at 09:14

## 2022-03-13 RX ADMIN — Medication 975 MILLIGRAM(S): at 06:49

## 2022-03-13 RX ADMIN — OXYCODONE HYDROCHLORIDE 5 MILLIGRAM(S): 5 TABLET ORAL at 19:05

## 2022-03-13 RX ADMIN — Medication 975 MILLIGRAM(S): at 12:29

## 2022-03-13 RX ADMIN — SODIUM CHLORIDE 4 MILLILITER(S): 9 INJECTION INTRAMUSCULAR; INTRAVENOUS; SUBCUTANEOUS at 09:17

## 2022-03-13 RX ADMIN — LACTULOSE 10 GRAM(S): 10 SOLUTION ORAL at 21:37

## 2022-03-13 RX ADMIN — OXYCODONE HYDROCHLORIDE 5 MILLIGRAM(S): 5 TABLET ORAL at 23:05

## 2022-03-13 RX ADMIN — MODAFINIL 100 MILLIGRAM(S): 200 TABLET ORAL at 18:30

## 2022-03-13 RX ADMIN — Medication 75 MICROGRAM(S): at 06:04

## 2022-03-13 RX ADMIN — Medication 3 MILLILITER(S): at 16:29

## 2022-03-13 RX ADMIN — OXYCODONE HYDROCHLORIDE 5 MILLIGRAM(S): 5 TABLET ORAL at 18:29

## 2022-03-13 RX ADMIN — SODIUM CHLORIDE 4 MILLILITER(S): 9 INJECTION INTRAMUSCULAR; INTRAVENOUS; SUBCUTANEOUS at 16:36

## 2022-03-13 RX ADMIN — Medication 975 MILLIGRAM(S): at 19:01

## 2022-03-13 RX ADMIN — Medication 975 MILLIGRAM(S): at 13:30

## 2022-03-13 NOTE — DISCHARGE NOTE PROVIDER - NSDCMRMEDTOKEN_GEN_ALL_CORE_FT
armodafinil 150 mg oral tablet: 1 tab(s) orally once a day  armodafinil 50 mg oral tablet: 2 tab(s) orally once a day  levothyroxine 75 mcg (0.075 mg) oral tablet: 1 tab(s) orally once a day  vitamin C: OTC supplement   vitamin D: OTC supplement   zinc: OTC supplement    Advil 200 mg oral tablet: 2 tab(s) orally every 6 hours, As Needed  Can alternate with Tylenol  armodafinil 150 mg oral tablet: 1 tab(s) orally once a day  armodafinil 50 mg oral tablet: 2 tab(s) orally once a day  bisacodyl 10 mg rectal suppository: 1 suppository(ies) rectal once a day, As Needed  levothyroxine 75 mcg (0.075 mg) oral tablet: 1 tab(s) orally once a day  magnesium citrate 1.745 g/30 mL oral liquid: orally once a day, As Needed  polyethylene glycol 3350 oral powder for reconstitution: 17 gram(s) orally once a day  senna oral tablet: 2 tab(s) orally once a day (at bedtime)  traMADol 50 mg oral tablet: 1 tab(s) orally every 6 hours, As Needed for severe pain.  MDD:4   Tylenol 325 mg oral tablet: 2 tab(s) orally every 6 hours, As Needed  vitamin C: OTC supplement   vitamin D: OTC supplement   zinc: OTC supplement   Zofran 4 mg oral tablet: 1 tab(s) orally every 8 hours, As Needed

## 2022-03-13 NOTE — DISCHARGE NOTE PROVIDER - NSDCCPTREATMENT_GEN_ALL_CORE_FT
PRINCIPAL PROCEDURE  Procedure: Segmentectomy, lung, robot-assisted, thoracoscopic  Findings and Treatment:

## 2022-03-13 NOTE — DISCHARGE NOTE PROVIDER - HOSPITAL COURSE
This 71 yr old female underwent a Robotic Assisted Right VATS, Right Superior Segmentectomy and MLND on 3/7/22.  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  A CT scan noted a RLL ground glass opacity and a follow up CT scan noted solid and ground glass pulmonary nodules. Pt also noted to have a pericardial diverticulum on a CT scan 11/21.  Post-op fevers were attributed to a possible infiltrate so Zosyn and Vanco were started and then switched to Augmentin for discharge home. 71 yr old female underwent a Robotic Assisted Right VATS, Right Superior Segmentectomy and MLND on 3/7/22.  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  A CT scan noted a RLL ground glass opacity and a follow up CT scan noted solid and ground glass pulmonary nodules. Pt also noted to have a pericardial diverticulum on a CT scan 11/21.  Post-op fevers were attributed to a possible infiltrate so Zosyn and Vanco were started and then switched to Augmentin. Antibiotics discontinued 3/14.  Pain medications adjusted. Narcotics stopped, encouraged to just keep Motrin w Tylenol ATC.  Pt nauseated and constipated.   3/15-Pt refusing lactulose, Mag Citrate. Willing to take Enema. 3/16- S/p Enema with sml BM. Today pt improved. Pt had additional TWE this am, had large BM. Feels better. Ate lunch.  IVF bolus given this am for hydration and hyponatremia. Pt dizziness improved. OOB ad keya in room. Rt. CT site suture removed per DR. Escobar. Pt in agreement to discharge home  this evening. Cleared for dc by Dr. Escobar who saw pt this morning.   All VS stable.

## 2022-03-13 NOTE — PROGRESS NOTE ADULT - SUBJECTIVE AND OBJECTIVE BOX
Subjective: pt admits pain at surgical site  "I am miserable, I feel weak and horrible"  pt seen and examined with Dr Escobar this AM who provided reassurance    Vital Signs:  Vital Signs Last 24 Hrs  T(C): 36.8 (03-13-22 @ 12:40), Max: 38.2 (03-12-22 @ 19:02)  T(F): 98.3 (03-13-22 @ 12:40), Max: 100.8 (03-12-22 @ 19:02)  HR: 78 (03-13-22 @ 12:40) (66 - 99)  BP: 125/51 (03-13-22 @ 12:40) (121/51 - 153/82)  RR: 19 (03-13-22 @ 12:40) (18 - 20)  SpO2: 100% (03-13-22 @ 12:40) (95% - 100%) on (O2)    Telemetry/Alarms: sr  General: WN/WD NAD  Neurology: Awake, nonfocal, HADDAD x 4  Eyes: Scleras clear, PERRLA/ EOMI, Gross vision intact  ENT:Gross hearing intact, grossly patent pharynx, no stridor  Neck: Neck supple, trachea midline, No JVD,   Respiratory: CTA B/L, No wheezing, rales, rhonchi  CV: RRR, S1S2, no murmurs, rubs or gallops  Abdominal: Soft, NT, ND +BS,   Extremities: No edema, + peripheral pulses  Skin: No Rashes, Hematoma, Ecchymosis  Lymphatic: No Neck, axilla, groin LAD  Psych: Oriented x 3, normal affect  Incisions: c,d,i    Relevant labs, radiology and Medications reviewed                        9.8    6.65  )-----------( 265      ( 13 Mar 2022 07:01 )             28.8     03-13    132<L>  |  94<L>  |  7   ----------------------------<  110<H>  4.4   |  27  |  0.52    Ca    9.5      13 Mar 2022 07:01  Phos  4.0     03-13  Mg     2.20     03-13        MEDICATIONS  (STANDING):  albuterol/ipratropium for Nebulization 3 milliLiter(s) Nebulizer every 6 hours  amoxicillin  875 milliGRAM(s)/clavulanate 1 Tablet(s) Oral every 12 hours  dornase beau Solution 2.5 milliGRAM(s) Inhalation two times a day  heparin   Injectable 5000 Unit(s) SubCutaneous every 12 hours  ibuprofen  Tablet. 400 milliGRAM(s) Oral every 8 hours  lactulose Syrup 10 Gram(s) Oral every 8 hours  levothyroxine 75 MICROGram(s) Oral daily  lidocaine   4% Patch 1 Patch Transdermal daily  modafinil 200 milliGRAM(s) Oral <User Schedule>  modafinil 100 milliGRAM(s) Oral <User Schedule>  pantoprazole    Tablet 40 milliGRAM(s) Oral before breakfast  polyethylene glycol 3350 17 Gram(s) Oral daily  senna 2 Tablet(s) Oral at bedtime  sodium chloride 3%  Inhalation 4 milliLiter(s) Inhalation every 6 hours    MEDICATIONS  (PRN):  acetaminophen     Tablet .. 975 milliGRAM(s) Oral every 6 hours PRN Temp greater or equal to 38C (100.4F)  metoclopramide Injectable 10 milliGRAM(s) IV Push every 8 hours PRN nausea or vomiting not controlled by zofran  naloxone Injectable 0.1 milliGRAM(s) IV Push every 3 minutes PRN For ANY of the following changes in patient status:  A. RR LESS THAN 10 breaths per minute, B. Oxygen saturation LESS THAN 90%, C. Sedation score of 6  ondansetron Injectable 4 milliGRAM(s) IV Push every 6 hours PRN Nausea  oxyCODONE    IR 2.5 milliGRAM(s) Oral every 3 hours PRN Moderate Pain (4 - 6)  oxyCODONE    IR 5 milliGRAM(s) Oral every 3 hours PRN Severe Pain (7 - 10)    Pertinent Physical Exam  I&O's Summary      Assessment  71y Female  w/ PAST MEDICAL & SURGICAL HISTORY:  Depression    Anxiety    Mononucleosis    Lyme disease    Hypothyroid    Vertigo    Chronic fatigue syndrome    Pulmonary nodule    Uterine fibroid    Cervical ca    Malignant melanoma    History of thoracotomy    Malignant melanoma  2021 - removed right back    H/O cone biopsy of cervix    71 yr old female underwent a Robotic Assisted Right VATS, Right Superior Segmentectomy and MLND on 3/7/22.  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  A CT scan noted a RLL ground glass opacity and a follow up CT scan noted solid and ground glass pulmonary nodules. Pt also noted to have a pericardial diverticulum on a CT scan 11/21.  Post-op fevers were attributed to a possible infiltrate so Zosyn and Vanco were started and then switched to Augmentin        PLAN  Neuro: Pain management  Pulm: Encourage coughing, deep breathing and use of incentive spirometry. Wean off supplemental oxygen as able. Daily CXR.   Cardio: Monitor telemetry/alarms  GI: Tolerating diet. Continue stool softeners.  Renal: monitor urine output, supplement electrolytes as needed  Vasc: Heparin SC/SCDs for DVT prophylaxis  Heme: Stable H/H. .   ID: Augmentin  Therapy: OOB/ambulate  Disposition: Aim to D/C to home tomorrow, once pain and symptoms controlled  Discussed with Cardiothoracic Team at AM rounds.      Subjective: pt admits pain at surgical site  "I am miserable, I feel weak and horrible"  pt seen and examined with Dr Escobar this AM who provided reassurance    Vital Signs:  Vital Signs Last 24 Hrs  T(C): 36.8 (03-13-22 @ 12:40), Max: 38.2 (03-12-22 @ 19:02)  T(F): 98.3 (03-13-22 @ 12:40), Max: 100.8 (03-12-22 @ 19:02)  HR: 78 (03-13-22 @ 12:40) (66 - 99)  BP: 125/51 (03-13-22 @ 12:40) (121/51 - 153/82)  RR: 19 (03-13-22 @ 12:40) (18 - 20)  SpO2: 100% (03-13-22 @ 12:40) (95% - 100%) on (O2)    Telemetry/Alarms: sr  General: WN/WD NAD  Neurology: Awake, nonfocal, HADDAD x 4  Eyes: Scleras clear, PERRLA/ EOMI, Gross vision intact  ENT:Gross hearing intact, grossly patent pharynx, no stridor  Neck: Neck supple, trachea midline, No JVD,   Respiratory: CTA B/L, No wheezing, rales, rhonchi  CV: RRR, S1S2, no murmurs, rubs or gallops  Abdominal: Soft, NT, ND +BS,   Extremities: No edema, + peripheral pulses  Skin: No Rashes, Hematoma, Ecchymosis  Lymphatic: No Neck, axilla, groin LAD  Psych: Oriented x 3, normal affect  Incisions: c,d,i    Relevant labs, radiology and Medications reviewed                        9.8    6.65  )-----------( 265      ( 13 Mar 2022 07:01 )             28.8     03-13    132<L>  |  94<L>  |  7   ----------------------------<  110<H>  4.4   |  27  |  0.52    Ca    9.5      13 Mar 2022 07:01  Phos  4.0     03-13  Mg     2.20     03-13        MEDICATIONS  (STANDING):  albuterol/ipratropium for Nebulization 3 milliLiter(s) Nebulizer every 6 hours  amoxicillin  875 milliGRAM(s)/clavulanate 1 Tablet(s) Oral every 12 hours  dornase beau Solution 2.5 milliGRAM(s) Inhalation two times a day  heparin   Injectable 5000 Unit(s) SubCutaneous every 12 hours  ibuprofen  Tablet. 400 milliGRAM(s) Oral every 8 hours  lactulose Syrup 10 Gram(s) Oral every 8 hours  levothyroxine 75 MICROGram(s) Oral daily  lidocaine   4% Patch 1 Patch Transdermal daily  modafinil 200 milliGRAM(s) Oral <User Schedule>  modafinil 100 milliGRAM(s) Oral <User Schedule>  pantoprazole    Tablet 40 milliGRAM(s) Oral before breakfast  polyethylene glycol 3350 17 Gram(s) Oral daily  senna 2 Tablet(s) Oral at bedtime  sodium chloride 3%  Inhalation 4 milliLiter(s) Inhalation every 6 hours    MEDICATIONS  (PRN):  acetaminophen     Tablet .. 975 milliGRAM(s) Oral every 6 hours PRN Temp greater or equal to 38C (100.4F)  metoclopramide Injectable 10 milliGRAM(s) IV Push every 8 hours PRN nausea or vomiting not controlled by zofran  naloxone Injectable 0.1 milliGRAM(s) IV Push every 3 minutes PRN For ANY of the following changes in patient status:  A. RR LESS THAN 10 breaths per minute, B. Oxygen saturation LESS THAN 90%, C. Sedation score of 6  ondansetron Injectable 4 milliGRAM(s) IV Push every 6 hours PRN Nausea  oxyCODONE    IR 2.5 milliGRAM(s) Oral every 3 hours PRN Moderate Pain (4 - 6)  oxyCODONE    IR 5 milliGRAM(s) Oral every 3 hours PRN Severe Pain (7 - 10)    Pertinent Physical Exam  I&O's Summary      Assessment  71y Female  w/ PAST MEDICAL & SURGICAL HISTORY:  Depression    Anxiety    Mononucleosis    Lyme disease    Hypothyroid    Vertigo    Chronic fatigue syndrome    Pulmonary nodule    Uterine fibroid    Cervical ca    Malignant melanoma    History of thoracotomy    Malignant melanoma  2021 - removed right back    H/O cone biopsy of cervix    71 yr old female underwent a Robotic Assisted Right VATS, Right Superior Segmentectomy and MLND on 3/7/22.  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  A CT scan noted a RLL ground glass opacity and a follow up CT scan noted solid and ground glass pulmonary nodules. Pt also noted to have a pericardial diverticulum on a CT scan 11/21.  Post-op fevers were attributed to a possible infiltrate so Zosyn and Vanco were started and then switched to Augmentin        PLAN  Neuro: Pain management; added motrin 400mg q8h for better pain control  Pulm: Encourage coughing, deep breathing and use of incentive spirometry. Wean off supplemental oxygen as able. Daily CXR.   Cardio: Monitor telemetry/alarms  GI: Tolerating diet. Continue stool softeners.  Renal: monitor urine output, supplement electrolytes as needed  Vasc: Heparin SC/SCDs for DVT prophylaxis  Heme: Stable H/H. .   ID: Augmentin  Therapy: OOB/ambulate  Disposition: Aim to D/C to home tomorrow, once pain and symptoms controlled  Discussed with Cardiothoracic Team at AM rounds.

## 2022-03-13 NOTE — DISCHARGE NOTE PROVIDER - NSDCFUADDINST_GEN_ALL_CORE_FT
Keep the chest tube dressing on for 2 days and then remove it so you can shower.  Keep the area open to air as much as possible.  Call Dr Escobar if increased bleeding or shortness of breath develop.   Walk frequently. Continue to use incentive spirometer. Continue to cough and deep breathe. Keep wounds uncovered and shower daily. Suture is already removed.

## 2022-03-13 NOTE — DISCHARGE NOTE PROVIDER - NSDCFUADDAPPT_GEN_ALL_CORE_FT
See Dr Escobar in about a week.  Call for an appointment.  See Dr Escobar in 2 weeks. Call for an apt. [phone]  See your PCP in 1-2 weeks as well.  Continue over the counter meds for constipation as needed.

## 2022-03-13 NOTE — DISCHARGE NOTE PROVIDER - CARE PROVIDERS DIRECT ADDRESSES
[email or web address]

## 2022-03-13 NOTE — DISCHARGE NOTE PROVIDER - CARE PROVIDER_API CALL
Ross Escobar)  Thoracic Surgery  [phone] [place] [address]  Phone: [phone]  Fax: [phone]  Established Patient  Follow Up Time: 1 week

## 2022-03-14 PROCEDURE — 71045 X-RAY EXAM CHEST 1 VIEW: CPT | Mod: 26

## 2022-03-14 RX ORDER — TRAMADOL HYDROCHLORIDE 50 MG/1
50 TABLET ORAL EVERY 4 HOURS
Refills: 0 | Status: DISCONTINUED | OUTPATIENT
Start: 2022-03-14 | End: 2022-03-15

## 2022-03-14 RX ORDER — TRAMADOL HYDROCHLORIDE 50 MG/1
25 TABLET ORAL EVERY 4 HOURS
Refills: 0 | Status: DISCONTINUED | OUTPATIENT
Start: 2022-03-14 | End: 2022-03-15

## 2022-03-14 RX ORDER — ONDANSETRON 8 MG/1
8 TABLET, FILM COATED ORAL EVERY 6 HOURS
Refills: 0 | Status: DISCONTINUED | OUTPATIENT
Start: 2022-03-14 | End: 2022-03-16

## 2022-03-14 RX ADMIN — SENNA PLUS 2 TABLET(S): 8.6 TABLET ORAL at 21:37

## 2022-03-14 RX ADMIN — LIDOCAINE 1 PATCH: 4 CREAM TOPICAL at 11:21

## 2022-03-14 RX ADMIN — LIDOCAINE 1 PATCH: 4 CREAM TOPICAL at 00:15

## 2022-03-14 RX ADMIN — Medication 10 MILLIGRAM(S): at 06:36

## 2022-03-14 RX ADMIN — POLYETHYLENE GLYCOL 3350 17 GRAM(S): 17 POWDER, FOR SOLUTION ORAL at 11:21

## 2022-03-14 RX ADMIN — Medication 400 MILLIGRAM(S): at 13:54

## 2022-03-14 RX ADMIN — Medication 400 MILLIGRAM(S): at 14:30

## 2022-03-14 RX ADMIN — HEPARIN SODIUM 5000 UNIT(S): 5000 INJECTION INTRAVENOUS; SUBCUTANEOUS at 17:20

## 2022-03-14 RX ADMIN — Medication 400 MILLIGRAM(S): at 21:37

## 2022-03-14 RX ADMIN — ONDANSETRON 4 MILLIGRAM(S): 8 TABLET, FILM COATED ORAL at 04:08

## 2022-03-14 RX ADMIN — ONDANSETRON 8 MILLIGRAM(S): 8 TABLET, FILM COATED ORAL at 11:22

## 2022-03-14 RX ADMIN — HEPARIN SODIUM 5000 UNIT(S): 5000 INJECTION INTRAVENOUS; SUBCUTANEOUS at 07:36

## 2022-03-14 RX ADMIN — Medication 10 MILLIGRAM(S): at 21:40

## 2022-03-14 RX ADMIN — Medication 10 MILLIGRAM(S): at 05:28

## 2022-03-14 RX ADMIN — OXYCODONE HYDROCHLORIDE 5 MILLIGRAM(S): 5 TABLET ORAL at 04:08

## 2022-03-14 NOTE — PROGRESS NOTE ADULT - SUBJECTIVE AND OBJECTIVE BOX
Subjective: pt with pain complaints  +nausea despite antiemetics  no BM x 8days, poor appetite  subjective abdominal distention   pt seen and examined with Dr Escobar this AM who provided reassurance    Vital Signs:  Vital Signs Last 24 Hrs  T(C): 36.7 (03-14-22 @ 04:14), Max: 37.3 (03-13-22 @ 17:34)  T(F): 98.1 (03-14-22 @ 04:14), Max: 99.2 (03-13-22 @ 21:30)  HR: 75 (03-14-22 @ 04:14) (69 - 90)  BP: 137/55 (03-14-22 @ 04:14) (123/48 - 137/55)  RR: 18 (03-14-22 @ 04:14) (18 - 19)  SpO2: 95% (03-14-22 @ 04:14) (95% - 100%) on (O2)    Telemetry/Alarms: ar  General: WN/WD NAD  Neurology: Awake, nonfocal, HADDAD x 4  Eyes: Scleras clear, PERRLA/ EOMI, Gross vision intact  ENT:Gross hearing intact, grossly patent pharynx, no stridor  Neck: Neck supple, trachea midline, No JVD,   Respiratory: CTA B/L, No wheezing, rales, rhonchi  CV: RRR, S1S2, no murmurs, rubs or gallops  Abdominal: Soft, NT, ND +BS,   Extremities: No edema, + peripheral pulses  Skin: No Rashes, Hematoma, Ecchymosis  Lymphatic: No Neck, axilla, groin LAD  Psych: Oriented x 3, normal affect  Incisions: c,d,i  Relevant labs, radiology and Medications reviewed                        9.8    6.65  )-----------( 265      ( 13 Mar 2022 07:01 )             28.8     03-13    132<L>  |  94<L>  |  7   ----------------------------<  110<H>  4.4   |  27  |  0.52    Ca    9.5      13 Mar 2022 07:01  Phos  4.0     03-13  Mg     2.20     03-13        MEDICATIONS  (STANDING):  albuterol/ipratropium for Nebulization 3 milliLiter(s) Nebulizer every 6 hours  dornase beau Solution 2.5 milliGRAM(s) Inhalation two times a day  heparin   Injectable 5000 Unit(s) SubCutaneous every 12 hours  ibuprofen  Tablet. 400 milliGRAM(s) Oral every 8 hours  lactulose Syrup 10 Gram(s) Oral every 8 hours  levothyroxine 75 MICROGram(s) Oral daily  lidocaine   4% Patch 1 Patch Transdermal daily  modafinil 200 milliGRAM(s) Oral <User Schedule>  modafinil 100 milliGRAM(s) Oral <User Schedule>  pantoprazole    Tablet 40 milliGRAM(s) Oral before breakfast  polyethylene glycol 3350 17 Gram(s) Oral daily  senna 2 Tablet(s) Oral at bedtime  sodium chloride 3%  Inhalation 4 milliLiter(s) Inhalation every 6 hours    MEDICATIONS  (PRN):  acetaminophen     Tablet .. 975 milliGRAM(s) Oral every 6 hours PRN Temp greater or equal to 38C (100.4F)  bisacodyl Suppository 10 milliGRAM(s) Rectal daily PRN Constipation  metoclopramide Injectable 10 milliGRAM(s) IV Push every 8 hours PRN nausea or vomiting not controlled by zofran  naloxone Injectable 0.1 milliGRAM(s) IV Push every 3 minutes PRN For ANY of the following changes in patient status:  A. RR LESS THAN 10 breaths per minute, B. Oxygen saturation LESS THAN 90%, C. Sedation score of 6  ondansetron Injectable 4 milliGRAM(s) IV Push every 6 hours PRN Nausea  oxyCODONE    IR 5 milliGRAM(s) Oral every 3 hours PRN Severe Pain (7 - 10)  oxyCODONE    IR 2.5 milliGRAM(s) Oral every 3 hours PRN Moderate Pain (4 - 6)    Pertinent Physical Exam  I&O's Summary    13 Mar 2022 07:01  -  14 Mar 2022 07:00  --------------------------------------------------------  IN: 240 mL / OUT: 0 mL / NET: 240 mL        Assessment  71y Female  w/ PAST MEDICAL & SURGICAL HISTORY:  Depression  Anxiety  Mononucleosis  Lyme disease  Hypothyroid  Vertigo  Chronic fatigue syndrome  Pulmonary nodule  Uterine fibroid  Cervical ca  Malignant melanoma  History of thoracotomy  Malignant melanoma  2021 - removed right back  H/O cone biopsy of cervix    71 yr old female underwent a Robotic Assisted Right VATS, Right Superior Segmentectomy and MLND on 3/7/22.  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  A CT scan noted a RLL ground glass opacity and a follow up CT scan noted solid and ground glass pulmonary nodules. Pt also noted to have a pericardial diverticulum on a CT scan 11/21.  Post-op fevers were attributed to a possible infiltrate so Zosyn and Vanco were started and then switched to Augmentin. Antibiotics discontinued today. Pain medications adjusted. Pt nauseated and constipated. Pain medication regimen adjusted.     PLAN  Neuro: Pain management; tylenol, motrin and tramadol  Pulm: Encourage coughing, deep breathing and use of incentive spirometry. Wean off supplemental oxygen as able. Daily CXR.   Cardio: Monitor telemetry/alarms  GI: Tolerating diet. Continue stool softeners and bowel regimen; zofran and reglan for nausea  Renal: monitor urine output, supplement electrolytes as needed  Vasc: Heparin SC/SCDs for DVT prophylaxis  Heme: Stable H/H. .   ID: will DC augmentin  Therapy: OOB/ambulate; encourage mobilization  Disposition: Aim to D/C to home once symptoms controlled  Discussed with Cardiothoracic Team at AM rounds.

## 2022-03-15 LAB
CULTURE RESULTS: SIGNIFICANT CHANGE UP
SPECIMEN SOURCE: SIGNIFICANT CHANGE UP

## 2022-03-15 PROCEDURE — 71045 X-RAY EXAM CHEST 1 VIEW: CPT | Mod: 26

## 2022-03-15 RX ORDER — SODIUM CHLORIDE 9 MG/ML
1000 INJECTION, SOLUTION INTRAVENOUS
Refills: 0 | Status: DISCONTINUED | OUTPATIENT
Start: 2022-03-15 | End: 2022-03-16

## 2022-03-15 RX ADMIN — POLYETHYLENE GLYCOL 3350 17 GRAM(S): 17 POWDER, FOR SOLUTION ORAL at 12:56

## 2022-03-15 RX ADMIN — SENNA PLUS 2 TABLET(S): 8.6 TABLET ORAL at 23:04

## 2022-03-15 RX ADMIN — Medication 10 MILLIGRAM(S): at 23:24

## 2022-03-15 RX ADMIN — SODIUM CHLORIDE 50 MILLILITER(S): 9 INJECTION, SOLUTION INTRAVENOUS at 17:33

## 2022-03-15 RX ADMIN — HEPARIN SODIUM 5000 UNIT(S): 5000 INJECTION INTRAVENOUS; SUBCUTANEOUS at 23:04

## 2022-03-15 RX ADMIN — HEPARIN SODIUM 5000 UNIT(S): 5000 INJECTION INTRAVENOUS; SUBCUTANEOUS at 09:34

## 2022-03-15 RX ADMIN — PANTOPRAZOLE SODIUM 40 MILLIGRAM(S): 20 TABLET, DELAYED RELEASE ORAL at 09:12

## 2022-03-15 RX ADMIN — Medication 75 MICROGRAM(S): at 09:12

## 2022-03-15 RX ADMIN — MODAFINIL 200 MILLIGRAM(S): 200 TABLET ORAL at 10:03

## 2022-03-15 RX ADMIN — Medication 10 MILLIGRAM(S): at 23:25

## 2022-03-15 NOTE — PROGRESS NOTE ADULT - SUBJECTIVE AND OBJECTIVE BOX
Subjective: "I'm so nauseous. I'm not eating or drinking too much" Pt states continued constipation. No BM since hospitalization. Passing flatus. No vomiting. Pt not willing to take certain laxatives. Discussed w pt to consider Enema today as best solution. Pt reluctant but in agreement. Amb ad keya in room.   No CP or SOB. C/o fatigue    Vital Signs:  Vital Signs Last 24 Hrs  T(C): 37.4 (03-15-22 @ 10:00), Max: 37.4 (03-15-22 @ 10:00)  T(F): 99.3 (03-15-22 @ 10:00), Max: 99.3 (03-15-22 @ 10:00)  HR: 86 (03-15-22 @ 10:00) (71 - 86)  BP: 133/57 (03-15-22 @ 10:00) (123/85 - 156/72)  RR: 17 (03-15-22 @ 10:00) (16 - 18)  SpO2: 96% (03-15-22 @ 10:00) (95% - 98%) on (O2)    Telemetry/Alarms:  General: WN/WD NAD  Neurology: Awake, nonfocal, HADDAD x 4  Eyes: Scleras clear, PERRLA/ EOMI, Gross vision intact  ENT:Gross hearing intact, grossly patent pharynx, no stridor  Neck: Neck supple, trachea midline, No JVD,   Respiratory: Dec BS at Right base  CV: RRR, S1S2, no murmurs, rubs or gallops  Abdominal: softly distended. Slight tender to right LQ upon palp. +BS, +flatus. Voiding.   Extremities: No edema, + peripheral pulses  Skin: No Rashes, Hematoma, Ecchymosis  Lymphatic: No Neck, axilla, groin LAD  Psych: Oriented x 3, normal affect  Incisions: Rt VATS DERECK, c/d/i.     Relevant labs, radiology and Medications reviewed    CXR with stable sml right pleural effusion.         MEDICATIONS  (STANDING):  dextrose 5% + sodium chloride 0.45%. 1000 milliLiter(s) (50 mL/Hr) IV Continuous <Continuous>  heparin   Injectable 5000 Unit(s) SubCutaneous every 12 hours  ibuprofen  Tablet. 400 milliGRAM(s) Oral every 8 hours  lactulose Syrup 10 Gram(s) Oral every 8 hours  levothyroxine 75 MICROGram(s) Oral daily  lidocaine   4% Patch 1 Patch Transdermal daily  modafinil 200 milliGRAM(s) Oral <User Schedule>  modafinil 100 milliGRAM(s) Oral <User Schedule>  pantoprazole    Tablet 40 milliGRAM(s) Oral before breakfast  polyethylene glycol 3350 17 Gram(s) Oral daily  senna 2 Tablet(s) Oral at bedtime    MEDICATIONS  (PRN):  acetaminophen     Tablet .. 975 milliGRAM(s) Oral every 6 hours PRN Temp greater or equal to 38C (100.4F)  bisacodyl Suppository 10 milliGRAM(s) Rectal daily PRN Constipation  metoclopramide Injectable 10 milliGRAM(s) IV Push every 8 hours PRN nausea or vomiting not controlled by zofran  naloxone Injectable 0.1 milliGRAM(s) IV Push every 3 minutes PRN For ANY of the following changes in patient status:  A. RR LESS THAN 10 breaths per minute, B. Oxygen saturation LESS THAN 90%, C. Sedation score of 6  ondansetron Injectable 8 milliGRAM(s) IV Push every 6 hours PRN Nausea and/or Vomiting    Pertinent Physical Exam  I&O's Summary    14 Mar 2022 07:01  -  15 Mar 2022 07:00  --------------------------------------------------------  IN: 977 mL / OUT: 0 mL / NET: 977 mL        Assessment  71y Female  w/ PAST MEDICAL & SURGICAL HISTORY:  Depression    Anxiety    Mononucleosis    Lyme disease    Hypothyroid    Vertigo    Chronic fatigue syndrome    Pulmonary nodule    Uterine fibroid    Cervical ca    Malignant melanoma    History of thoracotomy    Malignant melanoma  2021 - removed right back    H/O cone biopsy of cervix    admitted with complaints of Patient is a 71y old  Female who presents with a chief complaint of RVATS, Right superior segmentectomy (11 Mar 2022 10:35)  71 yr old female underwent a Robotic Assisted Right VATS, Right Superior Segmentectomy and MLND on 3/7/22.  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  A CT scan noted a RLL ground glass opacity and a follow up CT scan noted solid and ground glass pulmonary nodules. Pt also noted to have a pericardial diverticulum on a CT scan 11/21.  Post-op fevers were attributed to a possible infiltrate so Zosyn and Vanco were started and then switched to Augmentin. Antibiotics discontinued 3/14.  Pain medications adjusted. Narcotics stopped, encouraged to just keep Motrin w Tylenol ATC.  Pt nauseated and constipated.   3/15-Pt refusing lactulose, Mag Citrate. Willing to take Enema.     PLAN  Neuro: Pain management. Cont non narcotics as tolerated  Pulm: Encourage coughing, deep breathing and use of incentive spirometry.  Cardio: Monitor telemetry/alarms  GI: Cont diet as tolerated. Cont Reglan/Anti emetics as needed. Enema this am with some relief. May repeat this evening per Dr. Escobar  Renal: monitor urine output, supplement electrolytes as needed. IVF for now while po intake poor. Encourage diet.   Vasc: Heparin SC/SCDs for DVT prophylaxis  Heme: Stable H/H. .   ID: Off antibiotics. Stable.  Therapy: OOB/ambulate  Disposition: Aim to D/C to home once symptoms resolve.   Discussed with Cardiothoracic Team at AM rounds.

## 2022-03-16 ENCOUNTER — TRANSCRIPTION ENCOUNTER (OUTPATIENT)
Age: 72
End: 2022-03-16

## 2022-03-16 VITALS
TEMPERATURE: 98 F | SYSTOLIC BLOOD PRESSURE: 139 MMHG | RESPIRATION RATE: 17 BRPM | OXYGEN SATURATION: 98 % | HEART RATE: 77 BPM | DIASTOLIC BLOOD PRESSURE: 59 MMHG

## 2022-03-16 LAB
ANION GAP SERPL CALC-SCNC: 12 MMOL/L — SIGNIFICANT CHANGE UP (ref 7–14)
BUN SERPL-MCNC: 9 MG/DL — SIGNIFICANT CHANGE UP (ref 7–23)
CALCIUM SERPL-MCNC: 9.4 MG/DL — SIGNIFICANT CHANGE UP (ref 8.4–10.5)
CHLORIDE SERPL-SCNC: 95 MMOL/L — LOW (ref 98–107)
CO2 SERPL-SCNC: 23 MMOL/L — SIGNIFICANT CHANGE UP (ref 22–31)
CREAT SERPL-MCNC: 0.43 MG/DL — LOW (ref 0.5–1.3)
EGFR: 105 ML/MIN/1.73M2 — SIGNIFICANT CHANGE UP
GLUCOSE SERPL-MCNC: 120 MG/DL — HIGH (ref 70–99)
HCT VFR BLD CALC: 29.2 % — LOW (ref 34.5–45)
HGB BLD-MCNC: 9.6 G/DL — LOW (ref 11.5–15.5)
MCHC RBC-ENTMCNC: 29.6 PG — SIGNIFICANT CHANGE UP (ref 27–34)
MCHC RBC-ENTMCNC: 32.9 GM/DL — SIGNIFICANT CHANGE UP (ref 32–36)
MCV RBC AUTO: 90.1 FL — SIGNIFICANT CHANGE UP (ref 80–100)
NRBC # BLD: 0 /100 WBCS — SIGNIFICANT CHANGE UP
NRBC # FLD: 0 K/UL — SIGNIFICANT CHANGE UP
PLATELET # BLD AUTO: 397 K/UL — SIGNIFICANT CHANGE UP (ref 150–400)
POTASSIUM SERPL-MCNC: 3.7 MMOL/L — SIGNIFICANT CHANGE UP (ref 3.5–5.3)
POTASSIUM SERPL-SCNC: 3.7 MMOL/L — SIGNIFICANT CHANGE UP (ref 3.5–5.3)
RBC # BLD: 3.24 M/UL — LOW (ref 3.8–5.2)
RBC # FLD: 12.3 % — SIGNIFICANT CHANGE UP (ref 10.3–14.5)
SODIUM SERPL-SCNC: 130 MMOL/L — LOW (ref 135–145)
WBC # BLD: 7.97 K/UL — SIGNIFICANT CHANGE UP (ref 3.8–10.5)
WBC # FLD AUTO: 7.97 K/UL — SIGNIFICANT CHANGE UP (ref 3.8–10.5)

## 2022-03-16 RX ORDER — POTASSIUM CHLORIDE 20 MEQ
40 PACKET (EA) ORAL ONCE
Refills: 0 | Status: COMPLETED | OUTPATIENT
Start: 2022-03-16 | End: 2022-03-16

## 2022-03-16 RX ORDER — POLYETHYLENE GLYCOL 3350 17 G/17G
17 POWDER, FOR SOLUTION ORAL
Qty: 0 | Refills: 0 | DISCHARGE
Start: 2022-03-16

## 2022-03-16 RX ORDER — SENNA PLUS 8.6 MG/1
2 TABLET ORAL
Qty: 0 | Refills: 0 | DISCHARGE
Start: 2022-03-16

## 2022-03-16 RX ORDER — TRAMADOL HYDROCHLORIDE 50 MG/1
1 TABLET ORAL
Qty: 20 | Refills: 0
Start: 2022-03-16 | End: 2022-03-20

## 2022-03-16 RX ORDER — MULTIVIT WITH MIN/MFOLATE/K2 340-15/3 G
0.5 POWDER (GRAM) ORAL ONCE
Refills: 0 | Status: COMPLETED | OUTPATIENT
Start: 2022-03-16 | End: 2022-03-16

## 2022-03-16 RX ORDER — MULTIVIT WITH MIN/MFOLATE/K2 340-15/3 G
0 POWDER (GRAM) ORAL
Qty: 0 | Refills: 0 | DISCHARGE
Start: 2022-03-16

## 2022-03-16 RX ORDER — SODIUM CHLORIDE 9 MG/ML
500 INJECTION INTRAMUSCULAR; INTRAVENOUS; SUBCUTANEOUS ONCE
Refills: 0 | Status: COMPLETED | OUTPATIENT
Start: 2022-03-16 | End: 2022-03-16

## 2022-03-16 RX ORDER — ONDANSETRON 8 MG/1
1 TABLET, FILM COATED ORAL
Qty: 15 | Refills: 0
Start: 2022-03-16 | End: 2022-03-20

## 2022-03-16 RX ADMIN — Medication 40 MILLIEQUIVALENT(S): at 12:36

## 2022-03-16 RX ADMIN — Medication 10 MILLIGRAM(S): at 12:35

## 2022-03-16 RX ADMIN — PANTOPRAZOLE SODIUM 40 MILLIGRAM(S): 20 TABLET, DELAYED RELEASE ORAL at 08:58

## 2022-03-16 RX ADMIN — Medication 400 MILLIGRAM(S): at 14:53

## 2022-03-16 RX ADMIN — Medication 75 MICROGRAM(S): at 07:45

## 2022-03-16 RX ADMIN — HEPARIN SODIUM 5000 UNIT(S): 5000 INJECTION INTRAVENOUS; SUBCUTANEOUS at 07:45

## 2022-03-16 RX ADMIN — Medication 400 MILLIGRAM(S): at 14:23

## 2022-03-16 RX ADMIN — SODIUM CHLORIDE 250 MILLILITER(S): 9 INJECTION INTRAMUSCULAR; INTRAVENOUS; SUBCUTANEOUS at 10:07

## 2022-03-16 RX ADMIN — POLYETHYLENE GLYCOL 3350 17 GRAM(S): 17 POWDER, FOR SOLUTION ORAL at 12:35

## 2022-03-16 NOTE — DISCHARGE NOTE NURSING/CASE MANAGEMENT/SOCIAL WORK - PATIENT PORTAL LINK FT
You can access the FollowMyHealth Patient Portal offered by [facility] by registering at the following website: [email or web address]. By joining [facility]’s FollowMyHealth portal, you will also be able to view your health information using other applications (apps) compatible with our system.

## 2022-03-16 NOTE — PROGRESS NOTE ADULT - SUBJECTIVE AND OBJECTIVE BOX
Subjective: "I'm better but I still feel weak and dizzy sometimes" Denies any vomiting. Some nausea improved. Pt eating saltine crackers, an apple, jello, drinking water. Pt had 3 sml BMs yesterday after enema. Pt requesting another TWE this am since it's improving her symptoms. OOB ad keya but c/o general fatigue. No CP or SOB. Pt still  refusing MOM, lactulose and Mag citrate. Benefits discussed w pt at length    Vital Signs:  Vital Signs Last 24 Hrs  T(C): 37.2 (03-16-22 @ 07:39), Max: 37.4 (03-15-22 @ 10:00)  T(F): 98.9 (03-16-22 @ 07:39), Max: 99.4 (03-15-22 @ 20:10)  HR: 75 (03-16-22 @ 07:39) (72 - 86)  BP: 142/66 (03-16-22 @ 07:39) (133/57 - 153/68)  RR: 17 (03-16-22 @ 07:39) (17 - 18)  SpO2: 96% (03-16-22 @ 00:47) (96% - 99%) on (O2)    Telemetry/Alarms:  General: WN/WD NAD  Neurology: Awake, nonfocal, HADDAD x 4  Eyes: Scleras clear, PERRLA/ EOMI, Gross vision intact  ENT:Gross hearing intact, grossly patent pharynx, no stridor  Neck: Neck supple, trachea midline, No JVD,   Respiratory: slight dec to Rt. base  CV: RRR, S1S2, no murmurs, rubs or gallops  Abdominal: Abd softer today, minimal tenderness. +frequent flatus. BS x 4 quads. Sml BM x 3 yesterday  Extremities: No edema, + peripheral pulses  Skin: No Rashes, Hematoma, Ecchymosis  Lymphatic: No Neck, axilla, groin LAD  Psych: Oriented x 3, normal affect  Incisions: rt VATS c/d/i.     Relevant labs, radiology and Medications reviewed                        9.6    7.97  )-----------( 397      ( 16 Mar 2022 08:22 )             29.2     03-16    130<L>  |  95<L>  |  9   ----------------------------<  120<H>  3.7   |  23  |  0.43<L>    Ca    9.4      16 Mar 2022 08:22        MEDICATIONS  (STANDING):  bisacodyl Suppository 10 milliGRAM(s) Rectal daily  heparin   Injectable 5000 Unit(s) SubCutaneous every 12 hours  ibuprofen  Tablet. 400 milliGRAM(s) Oral every 8 hours  lactulose Syrup 10 Gram(s) Oral every 8 hours  levothyroxine 75 MICROGram(s) Oral daily  lidocaine   4% Patch 1 Patch Transdermal daily  modafinil 200 milliGRAM(s) Oral <User Schedule>  modafinil 100 milliGRAM(s) Oral <User Schedule>  pantoprazole    Tablet 40 milliGRAM(s) Oral before breakfast  polyethylene glycol 3350 17 Gram(s) Oral daily  senna 2 Tablet(s) Oral at bedtime  sodium chloride 0.9% Bolus 500 milliLiter(s) IV Bolus once    MEDICATIONS  (PRN):  acetaminophen     Tablet .. 975 milliGRAM(s) Oral every 6 hours PRN Temp greater or equal to 38C (100.4F)  metoclopramide Injectable 10 milliGRAM(s) IV Push every 8 hours PRN nausea or vomiting not controlled by zofran  naloxone Injectable 0.1 milliGRAM(s) IV Push every 3 minutes PRN For ANY of the following changes in patient status:  A. RR LESS THAN 10 breaths per minute, B. Oxygen saturation LESS THAN 90%, C. Sedation score of 6  ondansetron Injectable 8 milliGRAM(s) IV Push every 6 hours PRN Nausea and/or Vomiting    Pertinent Physical Exam  I&O's Summary    15 Mar 2022 07:01  -  16 Mar 2022 07:00  --------------------------------------------------------  IN: 950 mL / OUT: 0 mL / NET: 950 mL          Assessment  71y Female  w/ PAST MEDICAL & SURGICAL HISTORY:  Depression    Anxiety    Mononucleosis    Lyme disease    Hypothyroid    Vertigo    Chronic fatigue syndrome    Pulmonary nodule    Uterine fibroid    Cervical ca    Malignant melanoma    History of thoracotomy    Malignant melanoma  2021 - removed right back    H/O cone biopsy of cervix    admitted with complaints of Patient is a 71y old  Female who presents with a chief complaint of lung surgery (15 Mar 2022 14:49)  RVATS, Right superior segmentectomy (11 Mar 2022 10:35)  71 yr old female underwent a Robotic Assisted Right VATS, Right Superior Segmentectomy and MLND on 3/7/22.  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  A CT scan noted a RLL ground glass opacity and a follow up CT scan noted solid and ground glass pulmonary nodules. Pt also noted to have a pericardial diverticulum on a CT scan 11/21.  Post-op fevers were attributed to a possible infiltrate so Zosyn and Vanco were started and then switched to Augmentin. Antibiotics discontinued 3/14.  Pain medications adjusted. Narcotics stopped, encouraged to just keep Motrin w Tylenol ATC.  Pt nauseated and constipated.   3/15-Pt refusing lactulose, Mag Citrate. Willing to take Enema. 3/16- S/p Enema with sml BM. Today pt improved.     PLAN  Neuro: Pain management. Cont off all narcotics. Pain is controlled  Pulm: Encourage coughing, deep breathing and use of incentive spirometry.   Cardio: Monitor telemetry/alarms  GI: Cont diet as tolerated. Will give another TWE this am. Consider Mag Citrate.   Renal: monitor urine output, supplement electrolytes as needed. Will give IVF NS bolus for hydration/hyponatremia x 1   Vasc: Heparin SC/SCDs for DVT prophylaxis  Heme: Stable H/H. .   ID: Off antibiotics. Stable.  Therapy: OOB/ambulate  Disposition: Aim to D/C to home later today or tomorrow if pt improved.   Discussed with Cardiothoracic Team at AM rounds.

## 2022-03-16 NOTE — DISCHARGE NOTE NURSING/CASE MANAGEMENT/SOCIAL WORK - NSDCPNINST_GEN_ALL_CORE
Call your provider for a follow-up appointment.  Call your provider for fever/chills; persistent nausea, vomiting, diarrhea; uncontrolled bleeding; pus discharge; severe pain unrelieved by pain medication.

## 2022-03-16 NOTE — DIETITIAN INITIAL EVALUATION ADULT. - ORAL INTAKE PTA/DIET HISTORY
Pt follows a lacto-ovo vegetarian diet at home. She states her appetite and po intake were good PTA.

## 2022-03-16 NOTE — PROGRESS NOTE ADULT - PROVIDER SPECIALTY LIST ADULT
CT Surgery
Critical Care
Pain Medicine
Thoracic Surgery
Thoracic Surgery
Critical Care
Pain Medicine
Pain Medicine
CT Surgery
Critical Care

## 2022-03-16 NOTE — DIETITIAN INITIAL EVALUATION ADULT. - OTHER INFO
. Pt has a history of Hypothyroidism, Cervical Ca, Pulmonary Nodule and Malignant Melanoma. Pt is s/p Right VATS on 3/7/22.   Pt reports following a Lacto-Ovo Vegetarian diet. She states her po intake has been poor because she has been experiencing nausea. Discussed strategies to manage nausea. Encouraged Pt to try to increase her po intake.   Pt states her usual weight was 125 lbs. Her admission weight was 122 lbs.  RDN remains available.

## 2022-03-16 NOTE — DISCHARGE NOTE NURSING/CASE MANAGEMENT/SOCIAL WORK - NSDCPEFALRISK_GEN_ALL_CORE
For information on Fall & Injury Prevention, visit: [email or web address] OR  [email or web address] OR  https://www.cdc.gov/steadi/patient.html

## 2022-03-16 NOTE — DISCHARGE NOTE NURSING/CASE MANAGEMENT/SOCIAL WORK - NSDCFUADDAPPT_GEN_ALL_CORE_FT
See Dr Escobar in 2 weeks. Call for an apt. [phone]  See your PCP in 1-2 weeks as well.  Continue over the counter meds for constipation as needed.

## 2022-03-17 LAB — SURGICAL PATHOLOGY STUDY: SIGNIFICANT CHANGE UP

## 2022-03-29 NOTE — H&P PST ADULT - MAMMOGRAM, LAST, PROFILE
Chief Complaint   Patient presents with   • Follow-up     6 month follow up chronics       Here for follow-up of his chronic medical problems.  He is accompanied by his daughter.    Continues medical therapy for hypertension and depression.   Blood pressure appears to be controlled.  He has not been checking blood pressures at home recently.  No lightheadedness. Denies chest pain or shortness of breath.  No PND, orthopnea or lower extremity edema.     Denies uncontrolled symptoms of depression.  Continues the citalopram at 20 mg daily. Reports it is doing well at home.  He has been more physically and socially active in the last 2-3 years.  Spends a lot of time with family.  He is golfing in a league the last couple of summers and plans to do so weak season, walks 9-18 holes without any problems.  Walks outdoors for exercise without any problems and enjoys that.      Continues to follow with his neurosurgeon status post intracranial aneurysm coiling procedures and previous history of intracranial hemorrhage. His neurosurgeon has emphasized the importance of optimal blood pressure control.  After his last visit he was told he does not need to follow up there for 2 years from the last visit.  No new neurologic symptoms, deficits or headaches.    He decided to stop following up in pulmonology and he discontinued the pulmonary rehab.  He simply does not feel he needs it.  Stop the inhaler therapy other than the albuterol he has on hand but he has not felt the need to use it.  Denies shortness of breath, cough, wheezing recently.      No uncontrolled symptoms of heartburn or reflux, no dysphagia on the PPI.      Current Outpatient Medications   Medication Sig Dispense Refill   • citalopram (CeleXA) 20 MG tablet Take 1 tablet by mouth daily. 90 tablet 3   • lisinopril (ZESTRIL) 40 MG tablet Take 1 tablet by mouth daily. 90 tablet 1   • metoPROLOL tartrate (LOPRESSOR) 25 MG tablet Take 1 tablet by mouth 2 times daily. 180  tablet 1   • omeprazole (PriLOSEC) 40 MG capsule Take 1 capsule by mouth daily. 90 capsule 1   • tamsulosin (FLOMAX) 0.4 MG Cap Take 1 capsule by mouth daily after a meal. 90 capsule 1   • Spacer/Aero-Holding Chambers Device Use with MDI 1 each 0   • albuterol (PROVENTIL HFA) 108 (90 Base) MCG/ACT inhaler Inhale 2 puffs into the lungs every 4 hours as needed for Shortness of Breath or Wheezing.      • DISPENSE Super Trio  - Multi Vitamin, Omega 3, Antioxidant     • aspirin 81 MG EC tablet Take 1 tablet by mouth daily. 250 tablet 2   • Probiotic Product (PROBIOTIC DAILY PO) Take 2 tablets by mouth daily.       No current facility-administered medications for this visit.         ALLERGIES:  No Known Allergies    Past Medical History:   Diagnosis Date   • BPH (benign prostatic hyperplasia)    • Brain hemorrhage open without coma (CMS/HCC)    • Cerebral aneurysm     multiple   • Cerebral venous sinus thrombosis     anticoagulated for 1 yr   • COPD (chronic obstructive pulmonary disease) (CMS/[facility])    • Essential (primary) hypertension    • Gallstone pancreatitis 2014   • GERD (gastroesophageal reflux disease)    • Head ache    • Lingular pneumonia    • Unspecified intracranial hemorrhage        Social History     Tobacco Use   • Smoking status: Former Smoker     Packs/day: 1.50     Years: 50.00     Pack years: 75.00     Types: Cigarettes     Start date: 1960     Quit date: 2010     Years since quittin.6   • Smokeless tobacco: Never Used   Substance Use Topics   • Alcohol use: No     Alcohol/week: 0.0 standard drinks     Social History     Substance and Sexual Activity   Drug Use No             Visit Vitals  BP (!) 150/84 (BP Location: LUE - Left upper extremity, Patient Position: Sitting, Cuff Size: Regular)   Pulse (!) 52   Temp 97.3 °F (36.3 °C)   Resp 20   Ht 6' 1\" (1.854 m)   Wt 100 kg (220 lb 6.4 oz)   BMI 29.08 kg/m²       Mood and affect normal.      PHYSICAL EXAM  General:  Patient appeared in  no distress.  Vitals:  Vital signs reviewed, see nurse's notes.  HENT:  Normocephalic, atraumatic. Bilateral external ears normal. Tympanic membranes and ear canals normal. Oropharynx moist. No oral exudates. Nose normal.  Neck:  No jugular venous distention or lymphadenopathy.  Cardiovascular:  Regular rhythm. Normal sounds and absence of murmurs, rubs or gallops.  Lungs:  Clear to auscultation without rales, rhonchi, or wheezing.  Abdomen:  Soft, nontender, and without evidence of organomegaly, masses or guarding. Normal bowel sounds.  Extremities: Trace edema in ankles bilaterally.  Neurologic:  Nonfocal and normal sensation.    Recent PHQ (Patient Health Questionnaire) 2/9 Score    PHQ 2:  Date Adult PHQ 2 Score Adult PHQ 2 Interpretation   9/29/2021 0 No further screening needed       PHQ 9:               ASSESSMENT AND PLAN:    1. Essential hypertension  Blood pressure reasonably controlled.  Current regimen.  Follow-up in 6 months for wellness visit.  - lisinopril (ZESTRIL) 40 MG tablet; Take 1 tablet by mouth daily.  Dispense: 90 tablet; Refill: 1  - metoPROLOL tartrate (LOPRESSOR) 25 MG tablet; Take 1 tablet by mouth 2 times daily.  Dispense: 180 tablet; Refill: 1    2. Obstructive emphysema (CMS/HCC)  Normal exam today and he denies any perceived symptoms whatsoever.  Did again confirm with him that he is not wishing or willing to follow-up in pulmonology or get back on any of his previous inhaler therapies and he assures me that is indeed the case.  Seek evaluation promptly for any uncontrolled respiratory symptoms.      3. Major depressive disorder with single episode, in full remission (CMS/HCC)  Currently controlled.  Reported uncontrolled mood related symptoms and continue citalopram in the meantime.  Patient and his daughter both feel that he has definitely benefit from the medicine.    4. Gastroesophageal reflux disease, unspecified whether esophagitis present  Continue the PPI and report  uncontrolled symptoms.    5. Benign non-nodular prostatic hyperplasia with lower urinary tract symptoms  Denies any uncontrolled bladder related symptoms or nocturia on the Flomax.  Continue.  - tamsulosin (FLOMAX) 0.4 MG Cap; Take 1 capsule by mouth daily after a meal.  Dispense: 90 capsule; Refill: 1    6. Gastroesophageal reflux disease    - omeprazole (PriLOSEC) 40 MG capsule; Take 1 capsule by mouth daily.  Dispense: 90 capsule; Refill: 1     2021

## 2022-04-01 ENCOUNTER — APPOINTMENT (OUTPATIENT)
Dept: THORACIC SURGERY | Facility: CLINIC | Age: 72
End: 2022-04-01
Payer: MEDICARE

## 2022-04-01 VITALS
SYSTOLIC BLOOD PRESSURE: 134 MMHG | BODY MASS INDEX: 19.07 KG/M2 | OXYGEN SATURATION: 99 % | HEART RATE: 66 BPM | DIASTOLIC BLOOD PRESSURE: 75 MMHG | WEIGHT: 109 LBS | RESPIRATION RATE: 16 BRPM | HEIGHT: 63.5 IN

## 2022-04-01 DIAGNOSIS — J90 PLEURAL EFFUSION, NOT ELSEWHERE CLASSIFIED: ICD-10-CM

## 2022-04-01 PROCEDURE — 99024 POSTOP FOLLOW-UP VISIT: CPT

## 2022-04-26 ENCOUNTER — RESULT REVIEW (OUTPATIENT)
Age: 72
End: 2022-04-26

## 2022-04-26 ENCOUNTER — APPOINTMENT (OUTPATIENT)
Dept: CT IMAGING | Facility: HOSPITAL | Age: 72
End: 2022-04-26
Payer: MEDICARE

## 2022-04-26 ENCOUNTER — OUTPATIENT (OUTPATIENT)
Dept: OUTPATIENT SERVICES | Facility: HOSPITAL | Age: 72
LOS: 1 days | End: 2022-04-26
Payer: MEDICARE

## 2022-04-26 DIAGNOSIS — Z98.890 OTHER SPECIFIED POSTPROCEDURAL STATES: Chronic | ICD-10-CM

## 2022-04-26 DIAGNOSIS — C43.9 MALIGNANT MELANOMA OF SKIN, UNSPECIFIED: Chronic | ICD-10-CM

## 2022-04-26 DIAGNOSIS — R91.1 SOLITARY PULMONARY NODULE: ICD-10-CM

## 2022-04-26 PROCEDURE — 71250 CT THORAX DX C-: CPT | Mod: 26

## 2022-04-26 PROCEDURE — 71250 CT THORAX DX C-: CPT

## 2022-04-29 ENCOUNTER — NON-APPOINTMENT (OUTPATIENT)
Age: 72
End: 2022-04-29

## 2022-05-02 ENCOUNTER — OUTPATIENT (OUTPATIENT)
Dept: OUTPATIENT SERVICES | Facility: HOSPITAL | Age: 72
LOS: 1 days | Discharge: HOME | End: 2022-05-02

## 2022-05-02 DIAGNOSIS — Z98.890 OTHER SPECIFIED POSTPROCEDURAL STATES: Chronic | ICD-10-CM

## 2022-05-02 DIAGNOSIS — C43.9 MALIGNANT MELANOMA OF SKIN, UNSPECIFIED: ICD-10-CM

## 2022-05-02 DIAGNOSIS — C43.9 MALIGNANT MELANOMA OF SKIN, UNSPECIFIED: Chronic | ICD-10-CM

## 2022-05-04 ENCOUNTER — APPOINTMENT (OUTPATIENT)
Dept: THORACIC SURGERY | Facility: CLINIC | Age: 72
End: 2022-05-04
Payer: MEDICARE

## 2022-05-04 PROCEDURE — 99024 POSTOP FOLLOW-UP VISIT: CPT

## 2022-05-05 ENCOUNTER — APPOINTMENT (OUTPATIENT)
Dept: HEMATOLOGY ONCOLOGY | Facility: CLINIC | Age: 72
End: 2022-05-05
Payer: MEDICARE

## 2022-05-05 VITALS
TEMPERATURE: 98.4 F | DIASTOLIC BLOOD PRESSURE: 68 MMHG | WEIGHT: 112.88 LBS | HEART RATE: 68 BPM | HEIGHT: 63.46 IN | RESPIRATION RATE: 16 BRPM | OXYGEN SATURATION: 99 % | SYSTOLIC BLOOD PRESSURE: 115 MMHG | BODY MASS INDEX: 19.75 KG/M2

## 2022-05-05 PROCEDURE — 99214 OFFICE O/P EST MOD 30 MIN: CPT

## 2022-05-05 NOTE — ASSESSMENT
[FreeTextEntry1] : CT chest, pathology results reviewed.\par Stage T1a (1A) malignant melanoma–no vascular or neural invasion or ulceration on pathology.  Status post wide excision with no residual lesion. Surveillance-continue f/u with dermatology.\par \par 11/2021-RLL nodule on CT, PET/CT imaging. \par 3/7/22-s/p Right VATS robotic-assisted superior segmentectomy with lymph node dissection. 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 Stage 1A1 (T1miN0).\par Reviewed diagnosis, prognosis.  No plans for adjuvant therapy at this time.  Patient to proceed with CT angiogram of the chest as recommended by radiology based on most recent CT scan.  She also has short interval f/u CT chest ordered by thoracic surgery to do in ~ 6 weeks. No current complaints of chest pain, shortness of breath or palpitations.\par \par Patient was given the opportunity to ask questions.  Her questions have been answered to their apparent satisfaction at this time.

## 2022-05-05 NOTE — HISTORY OF PRESENT ILLNESS
[Disease: _____________________] : Disease: [unfilled] [T: ___] : T[unfilled] [AJCC Stage: ____] : AJCC Stage: [unfilled] [de-identified] : 2/2021-Patient presented to her dermatologist ([facility])-had full body scan, for screening evaluation-found suspicious lesion right side.\par Had biopsy of right flank lesion with pathology revealing melanoma measuring 0.32 mm.  No vascular or neuro invasion or ulceration.  She subsequently had excision of the area with pathology revealing no residual melanocytic proliferation.  Margins free (portion of skin that 4.7 x 2 x 0.8 cm).  Dermatologist recommended continued surveillance.\par \par 11/16/21-CT chest-3.7 cm superior right lower lobe part solid, cystic and groundglass lesion with 5 mm solid component on mediastinal windows, concerning for an adenocarcinoma spectrum lesion. Numerous other solid and groundglass nodules measuring up to 6 mm can be reassessed at that time.\par \par 11/22/21-PET/CT scan-part solid, cystic and groundglass lesions in superior segment right lower lobe, unchanged and better evaluated on CT from 11/15/2021, with background FDG avidity; this is indeterminate. Malignancy with low FDG avidity like adenocarcinoma in situ is not excluded. Additional scattered subcentimeter bilateral solid and groundglass nodules, too small to characterize are nonspecific. Fibroid uterus with no associated abnormal FDG activity.\par \par 3/7/22-s/p Right VATS robotic-assisted superior segmentectomy with lymph node dissection. 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 Stage 1A1 (T1miN0).\par \par  [de-identified] : melanoma [de-identified] : S/P lung surgery.\par Had visit with Dr. Escobar yesterday-told to do f/u CT chest in 6 weeks.\par Patient awaiting auth. for CTA chest.\par No c/o CP, SOB or increase cough. No GI/ or neurologic complaints. \par No abnormal bruising or bleeding reported. 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

## 2022-05-05 NOTE — PHYSICAL EXAM
[Fully active, able to carry on all pre-disease performance without restriction] : Status 0 - Fully active, able to carry on all pre-disease performance without restriction [Normal] : affect appropriate [de-identified] : right CW surgical scars

## 2022-05-13 ENCOUNTER — OUTPATIENT (OUTPATIENT)
Dept: OUTPATIENT SERVICES | Facility: HOSPITAL | Age: 72
LOS: 1 days | End: 2022-05-13
Payer: MEDICARE

## 2022-05-13 ENCOUNTER — NON-APPOINTMENT (OUTPATIENT)
Age: 72
End: 2022-05-13

## 2022-05-13 ENCOUNTER — RESULT REVIEW (OUTPATIENT)
Age: 72
End: 2022-05-13

## 2022-05-13 ENCOUNTER — APPOINTMENT (OUTPATIENT)
Dept: CT IMAGING | Facility: HOSPITAL | Age: 72
End: 2022-05-13
Payer: MEDICARE

## 2022-05-13 DIAGNOSIS — C34.90 MALIGNANT NEOPLASM OF UNSPECIFIED PART OF UNSPECIFIED BRONCHUS OR LUNG: ICD-10-CM

## 2022-05-13 DIAGNOSIS — Z98.890 OTHER SPECIFIED POSTPROCEDURAL STATES: Chronic | ICD-10-CM

## 2022-05-13 DIAGNOSIS — C43.9 MALIGNANT MELANOMA OF SKIN, UNSPECIFIED: Chronic | ICD-10-CM

## 2022-05-13 PROCEDURE — 71275 CT ANGIOGRAPHY CHEST: CPT | Mod: 26

## 2022-05-13 PROCEDURE — 71275 CT ANGIOGRAPHY CHEST: CPT

## 2022-05-21 ENCOUNTER — EMERGENCY (EMERGENCY)
Facility: HOSPITAL | Age: 72
LOS: 1 days | Discharge: HOME | End: 2022-05-21
Attending: EMERGENCY MEDICINE | Admitting: EMERGENCY MEDICINE
Payer: MEDICARE

## 2022-05-21 VITALS
SYSTOLIC BLOOD PRESSURE: 158 MMHG | HEIGHT: 63.75 IN | RESPIRATION RATE: 19 BRPM | TEMPERATURE: 98 F | WEIGHT: 112.44 LBS | HEART RATE: 73 BPM | DIASTOLIC BLOOD PRESSURE: 80 MMHG | OXYGEN SATURATION: 99 %

## 2022-05-21 DIAGNOSIS — Z98.890 OTHER SPECIFIED POSTPROCEDURAL STATES: Chronic | ICD-10-CM

## 2022-05-21 DIAGNOSIS — C43.9 MALIGNANT MELANOMA OF SKIN, UNSPECIFIED: Chronic | ICD-10-CM

## 2022-05-21 LAB
ALBUMIN SERPL ELPH-MCNC: 3.8 G/DL — SIGNIFICANT CHANGE UP (ref 3.3–5)
ALP SERPL-CCNC: 106 U/L — SIGNIFICANT CHANGE UP (ref 40–120)
ALT FLD-CCNC: 32 U/L — SIGNIFICANT CHANGE UP (ref 10–45)
ANION GAP SERPL CALC-SCNC: 7 MMOL/L — SIGNIFICANT CHANGE UP (ref 5–17)
AST SERPL-CCNC: 21 U/L — SIGNIFICANT CHANGE UP (ref 10–40)
BASOPHILS # BLD AUTO: 0.03 K/UL — SIGNIFICANT CHANGE UP (ref 0–0.2)
BASOPHILS NFR BLD AUTO: 0.6 % — SIGNIFICANT CHANGE UP (ref 0–2)
BILIRUB SERPL-MCNC: 0.2 MG/DL — SIGNIFICANT CHANGE UP (ref 0.2–1.2)
BUN SERPL-MCNC: 15 MG/DL — SIGNIFICANT CHANGE UP (ref 7–23)
CALCIUM SERPL-MCNC: 9 MG/DL — SIGNIFICANT CHANGE UP (ref 8.4–10.5)
CHLORIDE SERPL-SCNC: 98 MMOL/L — SIGNIFICANT CHANGE UP (ref 96–108)
CO2 SERPL-SCNC: 30 MMOL/L — SIGNIFICANT CHANGE UP (ref 22–31)
CREAT SERPL-MCNC: 0.54 MG/DL — SIGNIFICANT CHANGE UP (ref 0.5–1.3)
D DIMER BLD IA.RAPID-MCNC: 321 NG/ML DDU — HIGH
EGFR: 98 ML/MIN/1.73M2 — SIGNIFICANT CHANGE UP
EOSINOPHIL # BLD AUTO: 0.08 K/UL — SIGNIFICANT CHANGE UP (ref 0–0.5)
EOSINOPHIL NFR BLD AUTO: 1.5 % — SIGNIFICANT CHANGE UP (ref 0–6)
GLUCOSE SERPL-MCNC: 102 MG/DL — HIGH (ref 70–99)
HCT VFR BLD CALC: 35.3 % — SIGNIFICANT CHANGE UP (ref 34.5–45)
HGB BLD-MCNC: 11.9 G/DL — SIGNIFICANT CHANGE UP (ref 11.5–15.5)
IMM GRANULOCYTES NFR BLD AUTO: 0.4 % — SIGNIFICANT CHANGE UP (ref 0–1.5)
LYMPHOCYTES # BLD AUTO: 1.18 K/UL — SIGNIFICANT CHANGE UP (ref 1–3.3)
LYMPHOCYTES # BLD AUTO: 22.8 % — SIGNIFICANT CHANGE UP (ref 13–44)
MCHC RBC-ENTMCNC: 30.6 PG — SIGNIFICANT CHANGE UP (ref 27–34)
MCHC RBC-ENTMCNC: 33.7 GM/DL — SIGNIFICANT CHANGE UP (ref 32–36)
MCV RBC AUTO: 90.7 FL — SIGNIFICANT CHANGE UP (ref 80–100)
MONOCYTES # BLD AUTO: 0.48 K/UL — SIGNIFICANT CHANGE UP (ref 0–0.9)
MONOCYTES NFR BLD AUTO: 9.3 % — SIGNIFICANT CHANGE UP (ref 2–14)
NEUTROPHILS # BLD AUTO: 3.38 K/UL — SIGNIFICANT CHANGE UP (ref 1.8–7.4)
NEUTROPHILS NFR BLD AUTO: 65.4 % — SIGNIFICANT CHANGE UP (ref 43–77)
NRBC # BLD: 0 /100 WBCS — SIGNIFICANT CHANGE UP (ref 0–0)
PLATELET # BLD AUTO: 265 K/UL — SIGNIFICANT CHANGE UP (ref 150–400)
POTASSIUM SERPL-MCNC: 4.1 MMOL/L — SIGNIFICANT CHANGE UP (ref 3.5–5.3)
POTASSIUM SERPL-SCNC: 4.1 MMOL/L — SIGNIFICANT CHANGE UP (ref 3.5–5.3)
PROT SERPL-MCNC: 7.3 G/DL — SIGNIFICANT CHANGE UP (ref 6–8.3)
RBC # BLD: 3.89 M/UL — SIGNIFICANT CHANGE UP (ref 3.8–5.2)
RBC # FLD: 13.7 % — SIGNIFICANT CHANGE UP (ref 10.3–14.5)
SARS-COV-2 RNA SPEC QL NAA+PROBE: SIGNIFICANT CHANGE UP
SODIUM SERPL-SCNC: 135 MMOL/L — SIGNIFICANT CHANGE UP (ref 135–145)
TROPONIN I, HIGH SENSITIVITY RESULT: 8 NG/L — SIGNIFICANT CHANGE UP
WBC # BLD: 5.17 K/UL — SIGNIFICANT CHANGE UP (ref 3.8–10.5)
WBC # FLD AUTO: 5.17 K/UL — SIGNIFICANT CHANGE UP (ref 3.8–10.5)

## 2022-05-21 PROCEDURE — 71045 X-RAY EXAM CHEST 1 VIEW: CPT | Mod: 26

## 2022-05-21 PROCEDURE — 99285 EMERGENCY DEPT VISIT HI MDM: CPT

## 2022-05-21 PROCEDURE — 93010 ELECTROCARDIOGRAM REPORT: CPT

## 2022-05-21 NOTE — ED PROVIDER NOTE - NSTIMEPROVIDERCAREINITIATE_GEN_ER
Health Maintenance Due   Topic Date Due   • Diabetes GFR  12/04/1975   • Diabetes Foot Exam  12/04/1975   • DTaP/Tdap/Td Vaccine (1 - Tdap) 12/04/1976   • Cervical Cancer Screening HPV CO-Testing  12/04/1987   • Colorectal Cancer Screening-Colonoscopy  12/04/2007   • Shingles Vaccine (1 of 2) 12/04/2007   • Hepatitis C Screening  12/04/2008   • Diabetes A1C  07/22/2017   • Breast Cancer Screening  05/03/2019       Patient is due for the topics as listed above and wishes to proceed with them. Will discuss with provider           21-May-2022 21:16

## 2022-05-21 NOTE — ED PROVIDER NOTE - PATIENT PORTAL LINK FT
You can access the FollowMyHealth Patient Portal offered by [facility] by registering at the following website: [email or web address]. By joining [facility]’s FollowMyHealth portal, you will also be able to view your health information using other applications (apps) compatible with our system.

## 2022-05-21 NOTE — ED PROVIDER NOTE - OBJECTIVE STATEMENT
73 y/o F with h/o Pulmonary nodule resection few months ago , presents  to ED c/o past few days she is not feeling well,, c/o dry non productive cough , chest discomfort and generalized weakness , no Wheezing, no wt loss, also states she is under lots stress since she had lung surgery,

## 2022-05-21 NOTE — ED PROVIDER NOTE - CLINICAL SUMMARY MEDICAL DECISION MAKING FREE TEXT BOX
pt with h/o pulmonary nodule , s/p resection few months ago p/w non specific chest pain and SOB, but pt had no distress on exam , ekg was normal, cxr was NAPD , tropx2 were normal, has mildly elevated BP due stress, despite pt had mild elevation in D-dimer , pt is unlikely to have PE, in my clinical judgement pt is stressed out from complicated surgery of pulmonary nodule

## 2022-05-21 NOTE — ED ADULT TRIAGE NOTE - CHIEF COMPLAINT QUOTE
Pt c/o not feeling well/lightheaded with chest pressure, s/p right LLL surgery for stage 1 Ca last 3/22/22

## 2022-05-21 NOTE — ED ADULT NURSE NOTE - OBJECTIVE STATEMENT
Pt c/o not feeling well/lightheaded with chest pressure, s/p right LLL surgery for stage 1 Ca last 3/22/22  additionally  pt has recent bx. of right lower leg

## 2022-05-22 VITALS
SYSTOLIC BLOOD PRESSURE: 134 MMHG | RESPIRATION RATE: 20 BRPM | DIASTOLIC BLOOD PRESSURE: 76 MMHG | HEART RATE: 64 BPM | OXYGEN SATURATION: 99 % | TEMPERATURE: 98 F

## 2022-05-22 LAB
APPEARANCE UR: CLEAR — SIGNIFICANT CHANGE UP
BILIRUB UR-MCNC: NEGATIVE — SIGNIFICANT CHANGE UP
COLOR SPEC: YELLOW — SIGNIFICANT CHANGE UP
DIFF PNL FLD: NEGATIVE — SIGNIFICANT CHANGE UP
GLUCOSE UR QL: NEGATIVE — SIGNIFICANT CHANGE UP
KETONES UR-MCNC: NEGATIVE — SIGNIFICANT CHANGE UP
LEUKOCYTE ESTERASE UR-ACNC: NEGATIVE — SIGNIFICANT CHANGE UP
NITRITE UR-MCNC: NEGATIVE — SIGNIFICANT CHANGE UP
PH UR: 8 — SIGNIFICANT CHANGE UP (ref 5–8)
PROT UR-MCNC: NEGATIVE — SIGNIFICANT CHANGE UP
SP GR SPEC: 1.01 — SIGNIFICANT CHANGE UP (ref 1.01–1.02)
TROPONIN I, HIGH SENSITIVITY RESULT: 8.2 NG/L — SIGNIFICANT CHANGE UP
UROBILINOGEN FLD QL: NEGATIVE — SIGNIFICANT CHANGE UP

## 2022-05-22 PROCEDURE — 87635 SARS-COV-2 COVID-19 AMP PRB: CPT

## 2022-05-22 PROCEDURE — 36415 COLL VENOUS BLD VENIPUNCTURE: CPT

## 2022-05-22 PROCEDURE — 85025 COMPLETE CBC W/AUTO DIFF WBC: CPT

## 2022-05-22 PROCEDURE — 84484 ASSAY OF TROPONIN QUANT: CPT

## 2022-05-22 PROCEDURE — 80053 COMPREHEN METABOLIC PANEL: CPT

## 2022-05-22 PROCEDURE — 71045 X-RAY EXAM CHEST 1 VIEW: CPT

## 2022-05-22 PROCEDURE — 85379 FIBRIN DEGRADATION QUANT: CPT

## 2022-05-22 PROCEDURE — 99285 EMERGENCY DEPT VISIT HI MDM: CPT | Mod: 25

## 2022-05-22 PROCEDURE — 93005 ELECTROCARDIOGRAM TRACING: CPT

## 2022-05-31 DIAGNOSIS — I31.9 DISEASE OF PERICARDIUM, UNSPECIFIED: ICD-10-CM

## 2022-06-10 ENCOUNTER — APPOINTMENT (OUTPATIENT)
Dept: CARDIOLOGY | Facility: CLINIC | Age: 72
End: 2022-06-10
Payer: MEDICARE

## 2022-06-10 ENCOUNTER — NON-APPOINTMENT (OUTPATIENT)
Age: 72
End: 2022-06-10

## 2022-06-10 VITALS
DIASTOLIC BLOOD PRESSURE: 74 MMHG | WEIGHT: 112 LBS | RESPIRATION RATE: 19 BRPM | HEIGHT: 63 IN | BODY MASS INDEX: 19.84 KG/M2 | OXYGEN SATURATION: 99 % | HEART RATE: 57 BPM | SYSTOLIC BLOOD PRESSURE: 124 MMHG

## 2022-06-10 PROCEDURE — 93000 ELECTROCARDIOGRAM COMPLETE: CPT

## 2022-06-10 PROCEDURE — 99214 OFFICE O/P EST MOD 30 MIN: CPT

## 2022-06-10 PROCEDURE — 93306 TTE W/DOPPLER COMPLETE: CPT

## 2022-06-15 NOTE — DISCUSSION/SUMMARY
[Home] : at home, [unfilled] , at the time of the visit. [Medical Office: ([facility])___] : at the medical office located in  [Time Spent: ___ minutes] : I have spent [unfilled] minutes with the patient on the telephone

## 2022-06-15 NOTE — HISTORY OF PRESENT ILLNESS
[Preoperative Visit] : for a medical evaluation prior to surgery [Scheduled Procedure ___] : a [unfilled] [Date of Surgery ___] : on [unfilled] [Surgeon Name ___] : surgeon: [unfilled] [FreeTextEntry1] : Shala tells me that surgery is planned on 1/25. A CAT scan is plan one week prior. If the lesion is stable , then surgery may be postponed only to rescanned in 3 to 4 months. She notes cough however covid 19 testing was negative. she feels an occasional odd sensation under the rib cage over the past two years which is unexplained when she takes a deep breath.she  comes today for clarification of   Her   overall cardiovascular risk.\par she was advised to undergo a complete cardiac evaluation.she denies currentchest pains shortness of breath or loss of consciousnes.\par \par  [de-identified] : [place]

## 2022-06-15 NOTE — REASON FOR VISIT
[Symptom and Test Evaluation] : symptom and test evaluation [Abnormal Test Result] : an abnormal test result [FreeTextEntry1] : Shala tells me that surgery is planned on 125 she is saying [place] a CAT scan is planned one week before Deondre feels that if the lesion is stable and surgery may be postponed in the rescan planned in 3 to 4 months Shala notes a cough COBIT was negative she feels occasional odd sensation under the rib cage over the past two years which is unexplained when she takes a deep breath\par \par Update 6/10/2022\par Shala was recently seen in hospital with some shortness of breath the D-dimer was borderline elevated and a CTA to rule out pulmonary embolism was negative she inquires about some pleuritic-like pains which most likely represent postoperative inflammation\par

## 2022-06-15 NOTE — ASSESSMENT
[FreeTextEntry1] : I have asked   Shala   to undergo detailed cardiac testing in order to evaluate her overall cardiovascular risk.\par An assessment of both structural and functional heart disease was recommended to the patient.\par In this regard, a stress test in cardiac MRI were advised to the patient. we are concerned about melanoma and metastatic spread to the pericardium. The stresses has returned satisfactory although the achieved heart rate was non-diagnostic. Without evidence or recent infarction overt heart failure or unstable angina and based upon a satisfactory stress test Ms. Hale may undergo planned evaluation of the lung nodule which constitutes hi risk surgery in a patient with intermediate cardiac risk at an ASA class II or III anesthesia risk\par \par Addendum 6/10/2022\par The D-dimer was borderline given Shala's age and in fact a CTA was negative for pulmonary embolism we will continue to treat empirically with nonsteroidals and anti-inflammatories for some pleuritic-like pains follow-up with Dr. Hendrix for further oncology evaluations in the second setting of a carcinoma which has been resected\par \par medical necessity\par this is a high risk encounter based upon the need to assess postoperative atypical chest pains in the setting of a borderline biomarker D-dimer\par \par

## 2022-06-15 NOTE — PHYSICAL EXAM
[Normal Appearance] : normal appearance [General Appearance - Well Developed] : well developed [Well Groomed] : well groomed [No Deformities] : no deformities [General Appearance - Well Nourished] : well nourished [General Appearance - In No Acute Distress] : no acute distress [Normal Conjunctiva] : the conjunctiva exhibited no abnormalities [Eyelids - No Xanthelasma] : the eyelids demonstrated no xanthelasmas [Normal Oral Mucosa] : normal oral mucosa [No Oral Pallor] : no oral pallor [No Oral Cyanosis] : no oral cyanosis [Normal Jugular Venous A Waves Present] : normal jugular venous A waves present [Normal Jugular Venous V Waves Present] : normal jugular venous V waves present [No Jugular Venous Alonzo A Waves] : no jugular venous alonzo A waves [Respiration, Rhythm And Depth] : normal respiratory rhythm and effort [Exaggerated Use Of Accessory Muscles For Inspiration] : no accessory muscle use [Auscultation Breath Sounds / Voice Sounds] : lungs were clear to auscultation bilaterally [Heart Rate And Rhythm] : heart rate and rhythm were normal [Heart Sounds] : normal S1 and S2 [Murmurs] : no murmurs present [Abdomen Soft] : soft [Abdomen Tenderness] : non-tender [Abdomen Mass (___ Cm)] : no abdominal mass palpated [Abnormal Walk] : normal gait [Gait - Sufficient For Exercise Testing] : the gait was sufficient for exercise testing [Nail Clubbing] : no clubbing of the fingernails [Cyanosis, Localized] : no localized cyanosis [Petechial Hemorrhages (___cm)] : no petechial hemorrhages [Skin Color & Pigmentation] : normal skin color and pigmentation [] : no rash [No Venous Stasis] : no venous stasis [Skin Lesions] : no skin lesions [No Skin Ulcers] : no skin ulcer [No Xanthoma] : no  xanthoma was observed [Oriented To Time, Place, And Person] : oriented to person, place, and time [Affect] : the affect was normal [Mood] : the mood was normal [FreeTextEntry1] : mild appropriate anxiety

## 2022-06-20 ENCOUNTER — OUTPATIENT (OUTPATIENT)
Dept: OUTPATIENT SERVICES | Facility: HOSPITAL | Age: 72
LOS: 1 days | End: 2022-06-20
Payer: MEDICARE

## 2022-06-20 ENCOUNTER — APPOINTMENT (OUTPATIENT)
Dept: CT IMAGING | Facility: HOSPITAL | Age: 72
End: 2022-06-20
Payer: MEDICARE

## 2022-06-20 ENCOUNTER — RESULT REVIEW (OUTPATIENT)
Age: 72
End: 2022-06-20

## 2022-06-20 DIAGNOSIS — Z98.890 OTHER SPECIFIED POSTPROCEDURAL STATES: Chronic | ICD-10-CM

## 2022-06-20 DIAGNOSIS — C34.90 MALIGNANT NEOPLASM OF UNSPECIFIED PART OF UNSPECIFIED BRONCHUS OR LUNG: ICD-10-CM

## 2022-06-20 DIAGNOSIS — C43.9 MALIGNANT MELANOMA OF SKIN, UNSPECIFIED: Chronic | ICD-10-CM

## 2022-06-20 PROCEDURE — 71250 CT THORAX DX C-: CPT

## 2022-06-20 PROCEDURE — 71250 CT THORAX DX C-: CPT | Mod: 26

## 2022-06-22 ENCOUNTER — APPOINTMENT (OUTPATIENT)
Dept: THORACIC SURGERY | Facility: CLINIC | Age: 72
End: 2022-06-22
Payer: MEDICARE

## 2022-06-22 PROCEDURE — 99446 NTRPROF PH1/NTRNET/EHR 5-10: CPT

## 2022-06-23 ENCOUNTER — NON-APPOINTMENT (OUTPATIENT)
Age: 72
End: 2022-06-23

## 2022-06-24 ENCOUNTER — APPOINTMENT (OUTPATIENT)
Dept: CARDIOLOGY | Facility: CLINIC | Age: 72
End: 2022-06-24

## 2022-06-24 ENCOUNTER — NON-APPOINTMENT (OUTPATIENT)
Age: 72
End: 2022-06-24

## 2022-06-24 NOTE — DATA REVIEWED
[FreeTextEntry1] : Independently reviewed the following:\par - CT Chest on 5/13/22\par \par \par \par \par CT Scan of the chest from [facility] on 6/20/22\par COMPARISON: CT chest 5/13/2022.\par FINDINGS:\par LUNGS/AIRWAYS/PLEURA: No endobronchial lesion. Right upper lobe tracheal bronchus. Resolved groundglass component and overall decreased size of right lower lobe consolidative opacity adjacent to right lower lobe staple line. Stable 0.7 cm right upper lobe groundglass nodule (4-47) and few other sub-6 mm mm nodules in both lungs, for example, the left lower lobe (4-124) and left upper lobe (4-135). Stable small right pleural effusion and mild right pleural thickening.\par \par LYMPH NODES/MEDIASTINUM: Decreased mediastinal cyst. No enlarged lymph nodes.

## 2022-06-24 NOTE — REVIEW OF SYSTEMS
[Feeling Tired] : feeling tired [Cough] : cough [SOB on Exertion] : shortness of breath during exertion [Negative] : Heme/Lymph [Feeling Poorly] : not feeling poorly [Chest Pain] : no chest pain [Palpitations] : no palpitations [Shortness Of Breath] : no shortness of breath

## 2022-06-24 NOTE — ASSESSMENT
[FreeTextEntry1] : In summary, Ms. MANASA MCRAE, 72 year old female, never a smoker (+ second hand smoke), w/ hx of recently diagnosed Stage IA malignant melanoma. s/p wide excision of flank on 2/15/21, p/w neck pain s/p CT Neck which revealed RLL groundglass opacity. 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 Further CT Chest reveals other solid and groundglass pulmonary nodules. \par \par Patient presents today w/ follow up CT to evaluate RLL consolidation. Patient with pleuritic chest pain followed up with Dr. Rubi. D-Dimer borderline. Empirically treated with nonsteroidals and anti-inflammatories. She is only taking the antiinflamatory medications and states she really just feels pressure when taking deep breaths that is improving. She is still coughing but that is also improving. \par \par After reviewing her imaging there is improvement in her scans. Her pleuritic chest discomfort has resolved as has her shortness of breath. There remains scar tissue noted in the lungs however no area of concern for recurrence. I would like for her to continue surveillance imaging in 6 months with a non contrast CT. She is agreeable and will return to care after imaging. \par \par PLAN:\par - CT Scan of the chest non contrast in 6 months  \par - Return to care after imaging\par \par \par  \par \par \par \par Abram KNAPP NP am scribing for and in the presence of Dr. Escobar the following sections HISTORY OF PRESENT ILLNESS, PAST MEDICAL/FAMILY/SOCIAL HISTORY; REVIEW OF SYSTEMS; VITAL SIGNS; PHYSICAL EXAM; DISPOSITION.\par \par "I personally performed the services described in the documentation, reviewed the documentation recorded by the scribe in my presence and accurately and completely records my words and actions."\par \par \par \par

## 2022-06-24 NOTE — HISTORY OF PRESENT ILLNESS
[FreeTextEntry1] : Ms. MANASA MCRAE, 72 year old female, never a smoker (+ second hand smoke), w/ hx of recently diagnosed Stage IA malignant melanoma. s/p wide excision of flank on 2/15/21, p/w neck pain s/p CT Neck which revealed RLL groundglass opacity. 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 Further CT Chest reveals other solid and groundglass pulmonary nodules. \par \par \par \par 3/7/22-s/p Right VATS robotic-assisted superior segmentectomy with lymph node dissection on 3/7/22. Path of RLL superior segment lobectomy reveals T1miN0 Minimally Invasive Adenocarcinoma, 3.5 cm, G1, Acinar, Negative RACHEL, Negative Margins, Negative LN (0/10).\par \par CT Chest on 4/26/22:\par - Right lower lobe partial resection with adjacent consolidation with central groundglass. This at least represents postoperative changes however superimposed acute abnormality is a consideration\par \par CT Chest on 5/13/22: \par - Right upper lobe tracheal bronchus, anatomic variant\par - Decreased right lower lobe right lower lobe consolidation and central groundglass component adjacent to a staple line \par - Stable, small right pleural effusion and pleural thickening\par - Stable anterior mediastinal cyst.\par \par Post operatively, followed up with Dr. Sue Hendrix - No adjuvant therapy at this time. \par \par Patient presents today w/ follow up CT to evaluate RLL consolidation. Patient with pleuritic chest pain followed up with Dr. Rubi. D-Dimer borderline. Empirically treated with nonsteroidals and anti-inflammatories. She is only taking the antiinflamatory medications and states she really just feels pressure when taking deep breaths that is improving. She is still coughing but that is also improving. \par \par \par \par

## 2022-06-24 NOTE — CONSULT LETTER
[Dear  ___] : Dear  [unfilled], [Courtesy Letter:] : I had the pleasure of seeing your patient, [unfilled], in my office today. [Please see my note below.] : Please see my note below. [Sincerely,] : Sincerely, [FreeTextEntry2] : Dr. Hendrix ([facility])/ref \par  [FreeTextEntry3] : Ross Escobar MD, FACS \par Vice Chairperson, Thoracic Surgery, [facility] Department of Cardiovascular & Thoracic Surgery \par , [facility] [employer] [facility] [employer] [facility]

## 2022-06-24 NOTE — REASON FOR VISIT
[Patient] : the patient [Medical Office: ([facility])___] : at the medical office located in  [Home] : at home, [unfilled] , at the time of the visit. [FreeTextEntry4] : Abram Sawyer, NP

## 2022-07-05 ENCOUNTER — RX RENEWAL (OUTPATIENT)
Age: 72
End: 2022-07-05

## 2022-08-02 LAB
ALBUMIN SERPL ELPH-MCNC: 4.2 G/DL
ALP BLD-CCNC: 100 U/L
ALT SERPL-CCNC: 19 U/L
ANION GAP SERPL CALC-SCNC: 10 MMOL/L
AST SERPL-CCNC: 21 U/L
BASOPHILS # BLD AUTO: 0.04 K/UL
BASOPHILS NFR BLD AUTO: 0.8 %
BILIRUB SERPL-MCNC: 0.3 MG/DL
BUN SERPL-MCNC: 18 MG/DL
CALCIUM SERPL-MCNC: 9.5 MG/DL
CHLORIDE SERPL-SCNC: 98 MMOL/L
CO2 SERPL-SCNC: 26 MMOL/L
CREAT SERPL-MCNC: 0.56 MG/DL
EGFR: 98 ML/MIN/1.73M2
EOSINOPHIL # BLD AUTO: 0.09 K/UL
EOSINOPHIL NFR BLD AUTO: 1.7 %
GLUCOSE SERPL-MCNC: 101 MG/DL
HCT VFR BLD CALC: 36.6 %
HGB BLD-MCNC: 12.1 G/DL
IMM GRANULOCYTES NFR BLD AUTO: 0.2 %
LYMPHOCYTES # BLD AUTO: 1.16 K/UL
LYMPHOCYTES NFR BLD AUTO: 21.8 %
MAN DIFF?: NORMAL
MCHC RBC-ENTMCNC: 30.4 PG
MCHC RBC-ENTMCNC: 33.1 GM/DL
MCV RBC AUTO: 92 FL
MONOCYTES # BLD AUTO: 0.49 K/UL
MONOCYTES NFR BLD AUTO: 9.2 %
NEUTROPHILS # BLD AUTO: 3.53 K/UL
NEUTROPHILS NFR BLD AUTO: 66.3 %
PLATELET # BLD AUTO: 261 K/UL
POTASSIUM SERPL-SCNC: 4.2 MMOL/L
PROT SERPL-MCNC: 6.6 G/DL
RBC # BLD: 3.98 M/UL
RBC # FLD: 12.6 %
SODIUM SERPL-SCNC: 134 MMOL/L
WBC # FLD AUTO: 5.32 K/UL

## 2022-08-03 ENCOUNTER — OUTPATIENT (OUTPATIENT)
Dept: OUTPATIENT SERVICES | Facility: HOSPITAL | Age: 72
LOS: 1 days | Discharge: HOME | End: 2022-08-03

## 2022-08-03 DIAGNOSIS — Z98.890 OTHER SPECIFIED POSTPROCEDURAL STATES: Chronic | ICD-10-CM

## 2022-08-03 DIAGNOSIS — C43.9 MALIGNANT MELANOMA OF SKIN, UNSPECIFIED: ICD-10-CM

## 2022-08-03 DIAGNOSIS — C43.9 MALIGNANT MELANOMA OF SKIN, UNSPECIFIED: Chronic | ICD-10-CM

## 2022-08-11 ENCOUNTER — NON-APPOINTMENT (OUTPATIENT)
Age: 72
End: 2022-08-11

## 2022-08-11 ENCOUNTER — APPOINTMENT (OUTPATIENT)
Dept: HEMATOLOGY ONCOLOGY | Facility: CLINIC | Age: 72
End: 2022-08-11

## 2022-08-11 VITALS
WEIGHT: 113.54 LBS | HEIGHT: 63.03 IN | HEART RATE: 64 BPM | SYSTOLIC BLOOD PRESSURE: 147 MMHG | BODY MASS INDEX: 20.12 KG/M2 | DIASTOLIC BLOOD PRESSURE: 80 MMHG | OXYGEN SATURATION: 99 % | TEMPERATURE: 98 F | RESPIRATION RATE: 16 BRPM

## 2022-08-11 PROCEDURE — 99213 OFFICE O/P EST LOW 20 MIN: CPT

## 2022-08-11 RX ORDER — LEVOTHYROXINE SODIUM 0.09 MG/1
88 TABLET ORAL
Qty: 90 | Refills: 0 | Status: ACTIVE | COMMUNITY
Start: 2022-05-24

## 2022-08-11 NOTE — HISTORY OF PRESENT ILLNESS
[Disease: _____________________] : Disease: [unfilled] [T: ___] : T[unfilled] [AJCC Stage: ____] : AJCC Stage: [unfilled] [de-identified] : 2/2021-Patient presented to her dermatologist ([facility])-had full body scan, for screening evaluation-found suspicious lesion right side.\par Had biopsy of right flank lesion with pathology revealing melanoma measuring 0.32 mm.  No vascular or neuro invasion or ulceration.  She subsequently had excision of the area with pathology revealing no residual melanocytic proliferation.  Margins free (portion of skin that 4.7 x 2 x 0.8 cm).  Dermatologist recommended continued surveillance.\par \par 11/16/21-CT chest-3.7 cm superior right lower lobe part solid, cystic and groundglass lesion with 5 mm solid component on mediastinal windows, concerning for an adenocarcinoma spectrum lesion. Numerous other solid and groundglass nodules measuring up to 6 mm can be reassessed at that time.\par \par 11/22/21-PET/CT scan-part solid, cystic and groundglass lesions in superior segment right lower lobe, unchanged and better evaluated on CT from 11/15/2021, with background FDG avidity; this is indeterminate. Malignancy with low FDG avidity like adenocarcinoma in situ is not excluded. Additional scattered subcentimeter bilateral solid and groundglass nodules, too small to characterize are nonspecific. Fibroid uterus with no associated abnormal FDG activity.\par \par 3/7/22-s/p Right VATS robotic-assisted superior segmentectomy with lymph node dissection. 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 Stage 1A1 (T1miN0).\par \par  [de-identified] : melanoma [de-identified] : No c/o CP, SOB or increased cough. No GI/ or neurologic complaints. \par No abnormal bruising or bleeding reported. 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

## 2022-08-11 NOTE — ASSESSMENT
[FreeTextEntry1] : CT chest results, lab work reviewed.\par 2/2021 Stage T1a (1A) malignant melanoma–no vascular or neural invasion or ulceration on pathology.  Status post wide excision with no residual lesion. Surveillance-continue f/u with dermatology.\par \par 11/2021-RLL nodule on CT, PET/CT imaging. \par 3/7/22-s/p Right VATS robotic-assisted superior segmentectomy with lymph node dissection. 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 Stage 1A1 (T1miN0). Clinically stable at present. Expectant surveillance. Next CT chest per thoracic surgeon.\par \par #3) HM-Mammogram/breast US due-scripts given to patient.\par \par Patient was given the opportunity to ask questions.  Her questions have been answered to their apparent satisfaction at this time.

## 2022-08-11 NOTE — PHYSICAL EXAM
[Fully active, able to carry on all pre-disease performance without restriction] : Status 0 - Fully active, able to carry on all pre-disease performance without restriction [Normal] : affect appropriate [de-identified] : right CW surgical scars

## 2022-08-13 ENCOUNTER — NON-APPOINTMENT (OUTPATIENT)
Age: 72
End: 2022-08-13

## 2022-09-27 ENCOUNTER — APPOINTMENT (OUTPATIENT)
Dept: OBGYN | Facility: CLINIC | Age: 72
End: 2022-09-27

## 2022-09-27 VITALS
DIASTOLIC BLOOD PRESSURE: 80 MMHG | OXYGEN SATURATION: 96 % | HEIGHT: 63 IN | TEMPERATURE: 97.1 F | WEIGHT: 113 LBS | HEART RATE: 70 BPM | SYSTOLIC BLOOD PRESSURE: 138 MMHG | BODY MASS INDEX: 20.02 KG/M2

## 2022-09-27 PROCEDURE — 99387 INIT PM E/M NEW PAT 65+ YRS: CPT

## 2022-10-05 LAB
C TRACH RRNA SPEC QL NAA+PROBE: NOT DETECTED
CANDIDA VAG CYTO: NOT DETECTED
CYTOLOGY CVX/VAG DOC THIN PREP: ABNORMAL
G VAGINALIS+PREV SP MTYP VAG QL MICRO: NOT DETECTED
HPV HIGH+LOW RISK DNA PNL CVX: NOT DETECTED
N GONORRHOEA RRNA SPEC QL NAA+PROBE: NOT DETECTED
SOURCE AMPLIFICATION: NORMAL
T VAGINALIS VAG QL WET PREP: NOT DETECTED

## 2022-10-13 ENCOUNTER — RESULT REVIEW (OUTPATIENT)
Age: 72
End: 2022-10-13

## 2022-10-13 ENCOUNTER — APPOINTMENT (OUTPATIENT)
Dept: MAMMOGRAPHY | Facility: HOSPITAL | Age: 72
End: 2022-10-13

## 2022-10-13 ENCOUNTER — APPOINTMENT (OUTPATIENT)
Dept: ULTRASOUND IMAGING | Facility: HOSPITAL | Age: 72
End: 2022-10-13

## 2022-10-13 ENCOUNTER — OUTPATIENT (OUTPATIENT)
Dept: OUTPATIENT SERVICES | Facility: HOSPITAL | Age: 72
LOS: 1 days | End: 2022-10-13
Payer: MEDICARE

## 2022-10-13 DIAGNOSIS — C43.9 MALIGNANT MELANOMA OF SKIN, UNSPECIFIED: Chronic | ICD-10-CM

## 2022-10-13 DIAGNOSIS — Z98.890 OTHER SPECIFIED POSTPROCEDURAL STATES: Chronic | ICD-10-CM

## 2022-10-13 DIAGNOSIS — Z01.419 ENCOUNTER FOR GYNECOLOGICAL EXAMINATION (GENERAL) (ROUTINE) WITHOUT ABNORMAL FINDINGS: ICD-10-CM

## 2022-10-13 PROCEDURE — 77063 BREAST TOMOSYNTHESIS BI: CPT | Mod: 26

## 2022-10-13 PROCEDURE — 77067 SCR MAMMO BI INCL CAD: CPT | Mod: 26

## 2022-10-13 PROCEDURE — 77063 BREAST TOMOSYNTHESIS BI: CPT

## 2022-10-13 PROCEDURE — 76641 ULTRASOUND BREAST COMPLETE: CPT | Mod: 26,50

## 2022-10-13 PROCEDURE — 77067 SCR MAMMO BI INCL CAD: CPT

## 2022-10-13 PROCEDURE — 76641 ULTRASOUND BREAST COMPLETE: CPT

## 2022-10-26 ENCOUNTER — APPOINTMENT (OUTPATIENT)
Dept: GASTROENTEROLOGY | Facility: CLINIC | Age: 72
End: 2022-10-26

## 2022-10-26 VITALS
BODY MASS INDEX: 20.02 KG/M2 | TEMPERATURE: 97.5 F | OXYGEN SATURATION: 97 % | SYSTOLIC BLOOD PRESSURE: 110 MMHG | HEART RATE: 64 BPM | WEIGHT: 113 LBS | DIASTOLIC BLOOD PRESSURE: 70 MMHG | HEIGHT: 63 IN | RESPIRATION RATE: 15 BRPM

## 2022-10-26 DIAGNOSIS — Z80.0 FAMILY HISTORY OF MALIGNANT NEOPLASM OF DIGESTIVE ORGANS: ICD-10-CM

## 2022-10-26 DIAGNOSIS — Z87.898 PERSONAL HISTORY OF OTHER SPECIFIED CONDITIONS: ICD-10-CM

## 2022-10-26 PROCEDURE — 99205 OFFICE O/P NEW HI 60 MIN: CPT

## 2022-10-26 NOTE — PHYSICAL EXAM
[Alert] : alert [Normal Voice/Communication] : normal voice/communication [Healthy Appearing] : healthy appearing [No Acute Distress] : no acute distress [Sclera] : the sclera and conjunctiva were normal [Hearing Threshold Finger Rub Not [place]] : hearing was normal [Normal Lips/Gums] : the lips and gums were normal [Oropharynx] : the oropharynx was normal [Normal Appearance] : the appearance of the neck was normal [No Neck Mass] : no neck mass was observed [No Respiratory Distress] : no respiratory distress [No Acc Muscle Use] : no accessory muscle use [Respiration, Rhythm And Depth] : normal respiratory rhythm and effort [Auscultation Breath Sounds / Voice Sounds] : lungs were clear to auscultation bilaterally [Heart Rate And Rhythm] : heart rate was normal and rhythm regular [Normal S1, S2] : normal S1 and S2 [Murmurs] : no murmurs [Bowel Sounds] : normal bowel sounds [Abdomen Tenderness] : non-tender [No Masses] : no abdominal mass palpated [Abdomen Soft] : soft [] : no hepatosplenomegaly [Oriented To Time, Place, And Person] : oriented to person, place, and time

## 2022-10-26 NOTE — ASSESSMENT
[FreeTextEntry1] : Impression: \par \par Band like epig pain with pancreatitis in ddx, gallstones or otherwise.\par Excess bloating: trial of Iberogas.\par Trial of famotidine.\par \par The patient seems to have longstanding functional GI issues as well.  I have prescribed cognitive behavioral therapy.\par \par I have spent a great deal of time discussing the role of daily high-intensity exercise with the patient. We discussed behavior modification strategies to institute this habit.   I have discussed nutrition in great detail including consuming vegetable fibers on a daily basis and limiting simple carbohydrates 5 days per week.  We have also reviewed the benefits of soluble fiber supplementation, including (but not limited to), favorable effects on lipid profile, weight control and the salutary effects on colonic microbiota. We reviewed the effects of such daily habits on metabolism and the metabolic set point resulting in a healthy weight, decreased pain sensitivity (effecting the GI tract), bowel habits and other aspects of health.  I recommended a mindful meditation practice and reviewed behavior modification strategies.\par \par I have asked the patient to schedule both an upper endoscopy and a colonoscopy in the near future. I have reviewed the risks benefits and alternatives and provide the patient literature to read.  I have emphasized the need to have a good clean out including adequate fluid intake and avoiding seeds for one week prior to the procedure.  She prefers to defer at this time but will discuss at follow-up in 1 month.

## 2022-11-29 ENCOUNTER — APPOINTMENT (OUTPATIENT)
Dept: GASTROENTEROLOGY | Facility: CLINIC | Age: 72
End: 2022-11-29

## 2022-12-19 NOTE — H&P PST ADULT - FUNCTIONAL ASSESSMENT - BASIC MOBILITY ASSESSMENT TYPE
[FreeTextEntry1] : Assessment:\par \par 1. Provoked (PICC line) R Subclavian DVT 12/23/21\par     DVT resolved \par 2. HIT\par 3. 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 HTN\par 5. HLD\par \par Plan:\par 1.  She should see neurology for eval of her LOC\par 2.  Cardiac eval:\par - echo\par - Holter monitor\par - ecg today with Sinus w RBBB\par \par AG\par  Admission

## 2023-01-09 ENCOUNTER — OUTPATIENT (OUTPATIENT)
Dept: OUTPATIENT SERVICES | Facility: HOSPITAL | Age: 73
LOS: 1 days | End: 2023-01-09
Payer: MEDICARE

## 2023-01-09 ENCOUNTER — APPOINTMENT (OUTPATIENT)
Dept: CT IMAGING | Facility: HOSPITAL | Age: 73
End: 2023-01-09
Payer: MEDICARE

## 2023-01-09 DIAGNOSIS — Z98.890 OTHER SPECIFIED POSTPROCEDURAL STATES: Chronic | ICD-10-CM

## 2023-01-09 DIAGNOSIS — C43.9 MALIGNANT MELANOMA OF SKIN, UNSPECIFIED: Chronic | ICD-10-CM

## 2023-01-09 DIAGNOSIS — R91.1 SOLITARY PULMONARY NODULE: ICD-10-CM

## 2023-01-09 DIAGNOSIS — C34.90 MALIGNANT NEOPLASM OF UNSPECIFIED PART OF UNSPECIFIED BRONCHUS OR LUNG: ICD-10-CM

## 2023-01-09 PROCEDURE — 71250 CT THORAX DX C-: CPT | Mod: 26

## 2023-01-09 PROCEDURE — 71250 CT THORAX DX C-: CPT

## 2023-01-12 ENCOUNTER — APPOINTMENT (OUTPATIENT)
Dept: ULTRASOUND IMAGING | Facility: HOSPITAL | Age: 73
End: 2023-01-12
Payer: MEDICARE

## 2023-01-12 ENCOUNTER — OUTPATIENT (OUTPATIENT)
Dept: OUTPATIENT SERVICES | Facility: HOSPITAL | Age: 73
LOS: 1 days | End: 2023-01-12
Payer: MEDICARE

## 2023-01-12 DIAGNOSIS — C43.9 MALIGNANT MELANOMA OF SKIN, UNSPECIFIED: Chronic | ICD-10-CM

## 2023-01-12 DIAGNOSIS — Z98.890 OTHER SPECIFIED POSTPROCEDURAL STATES: Chronic | ICD-10-CM

## 2023-01-12 DIAGNOSIS — Z00.8 ENCOUNTER FOR OTHER GENERAL EXAMINATION: ICD-10-CM

## 2023-01-12 PROCEDURE — 76830 TRANSVAGINAL US NON-OB: CPT

## 2023-01-12 PROCEDURE — 76830 TRANSVAGINAL US NON-OB: CPT | Mod: 26

## 2023-01-18 ENCOUNTER — APPOINTMENT (OUTPATIENT)
Dept: THORACIC SURGERY | Facility: CLINIC | Age: 73
End: 2023-01-18
Payer: MEDICARE

## 2023-01-18 VITALS
SYSTOLIC BLOOD PRESSURE: 126 MMHG | WEIGHT: 116 LBS | RESPIRATION RATE: 18 BRPM | BODY MASS INDEX: 20.55 KG/M2 | TEMPERATURE: 97.8 F | HEART RATE: 60 BPM | DIASTOLIC BLOOD PRESSURE: 76 MMHG | OXYGEN SATURATION: 97 %

## 2023-01-18 PROCEDURE — 99213 OFFICE O/P EST LOW 20 MIN: CPT

## 2023-01-18 NOTE — PHYSICAL EXAM
[] : no respiratory distress [Respiration, Rhythm And Depth] : normal respiratory rhythm and effort [Exaggerated Use Of Accessory Muscles For Inspiration] : no accessory muscle use [Auscultation Breath Sounds / Voice Sounds] : lungs were clear to auscultation bilaterally [Examination Of The Chest] : the chest was normal in appearance [Chest Visual Inspection Thoracic Asymmetry] : no chest asymmetry [Diminished Respiratory Excursion] : normal chest expansion [2+] : left 2+ [Bowel Sounds] : normal bowel sounds [Abdomen Soft] : soft [Abdomen Tenderness] : non-tender [Cervical Lymph Nodes Enlarged Posterior Bilaterally] : posterior cervical [Cervical Lymph Nodes Enlarged Anterior Bilaterally] : anterior cervical [Supraclavicular Lymph Nodes Enlarged Bilaterally] : supraclavicular [Skin Color & Pigmentation] : normal skin color and pigmentation [Skin Turgor] : normal skin turgor [Oriented To Time, Place, And Person] : oriented to person, place, and time

## 2023-01-18 NOTE — H&P PST ADULT - NEGATIVE NEUROLOGICAL SYMPTOMS
No problem, Dr. Vega can review with her at her OTV.  
Received a call from Lou. She is interested in sitting down Monday and looking at her radiation plan either before or after her first treatment. She has many questions about the fields and what is being treated. I will forward message to radiation. She was appreciate of the assistance.  
no transient paralysis/no weakness/no paresthesias/no generalized seizures

## 2023-01-19 ENCOUNTER — NON-APPOINTMENT (OUTPATIENT)
Age: 73
End: 2023-01-19

## 2023-01-19 NOTE — CONSULT LETTER
[Dear  ___] : Dear  [unfilled], [Courtesy Letter:] : I had the pleasure of seeing your patient, [unfilled], in my office today. [Please see my note below.] : Please see my note below. [Sincerely,] : Sincerely, [FreeTextEntry2] : Dr. Hendrix ([facility])/ref \par  [FreeTextEntry3] : Ross Escobar MD, FACS \par Vice Chairperson, Thoracic Surgery, [facility] Department of Cardiovascular & Thoracic Surgery \par , [facility] [employer] [facility]

## 2023-01-19 NOTE — HISTORY OF PRESENT ILLNESS
[FreeTextEntry1] : Ms. MANASA MCRAE, 72 year old female, never a smoker (+ second hand smoke), w/ hx of Stage IA malignant melanoma. s/p wide excision of flank on 2/15/21, p/w neck pain s/p CT Neck which revealed RLL groundglass opacity. 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 Further CT Chest reveals other solid and groundglass pulmonary nodules. \par \par 3/7/22-s/p Right VATS robotic-assisted superior segmentectomy with lymph node dissection on 3/7/22. Path of RLL superior segment lobectomy reveals T1miN0 Minimally Invasive Adenocarcinoma, 3.5 cm, G1, Acinar, Negative RACHEL, Negative Margins, Negative LN (0/10).\par \par CT Chest on 4/26/22:\par - Right lower lobe partial resection with adjacent consolidation with central groundglass. This at least represents postoperative changes however superimposed acute abnormality is a consideration\par \par CT Chest on 5/13/22: \par - Right upper lobe tracheal bronchus, anatomic variant\par - Decreased right lower lobe right lower lobe consolidation and central groundglass component adjacent to a staple line \par - Stable, small right pleural effusion and pleural thickening\par - Stable anterior mediastinal cyst.\par \par Post operatively, followed up with Dr. Sue Hendrix - No adjuvant therapy at this time. \par \par Last visit 6/22/22- Patient presents today w/ follow up CT to evaluate RLL consolidation. Patient with pleuritic chest pain followed up with Dr. Rubi. D-Dimer borderline. Empirically treated with nonsteroidal and anti-inflammatories. She is only taking the antiinflammatory medications and states she really just feels pressure when taking deep breaths that is improving. She is still coughing but that is also improving. \par \par CT Chest on 1/9/23:\par - Decreased density surrounding staple line in RLL\par - Tracheal bronchus again supplies RUL\par - Stable 7 x 4 mm RUL pure groundglass nodule (image 44 series 3)\par - Several stable solid sub-5 mm lung nodules for instance RML (image 74) and LLL (images 105 and 121)\par - Trace right pleural effusion has resolved with similar mild right pleural thickening posterior right base.\par - Stable homogeneous fluid attenuation lesion abutting the pericardium previously demonstrating no enhancing components on MRI suggestive of pericardial cyst or diverticulum.\par \par Patient is here today for 6 month follow up. Endorses feeling more winded in the cold weather. Today, patient denies chest pain, cough, hemoptysis, fever, chills, night sweats, lightheadedness or dizziness.\par \par \par \par \par

## 2023-01-19 NOTE — ASSESSMENT
[FreeTextEntry1] : In summary, Ms. MANASA MCRAE, 72 year old female, never a smoker (+ second hand smoke), w/ hx of  diagnosed Stage IA malignant melanoma. s/p wide excision of flank on 2/15/21\par  \par 3/7/22-s/p Right VATS robotic-assisted superior segmentectomy with lymph node dissection on 3/7/22. Path of RLL superior segment lobectomy reveals T1miN0 Minimally Invasive Adenocarcinoma, 3.5 cm, G1, Acinar, Negative RACHEL, Negative Margins, Negative LN (0/10).\par \par I have independently reviewed the medical records and imaging at the time of this office consultation. CT chest with stable findings. Recommendation to return to clinic in 12 months with repeat non contrast CT to re-evaluate stability. Referred to pulm for evaluation regarding dyspnea. \par \par I, VINAY Chaudhry, personally performed the evaluation and management (E/M) services for this established patient. That E/M includes conducting the examination, assessing all new/exacerbated conditions, and establishing a new plan of care. Today, My ACP, Ana Olmstead, was here to observe my evaluation and management services for this patient to be followed going forward.\par \par \par \par

## 2023-01-23 ENCOUNTER — NON-APPOINTMENT (OUTPATIENT)
Age: 73
End: 2023-01-23

## 2023-01-25 ENCOUNTER — APPOINTMENT (OUTPATIENT)
Dept: OBGYN | Facility: CLINIC | Age: 73
End: 2023-01-25

## 2023-02-02 ENCOUNTER — OUTPATIENT (OUTPATIENT)
Dept: OUTPATIENT SERVICES | Facility: HOSPITAL | Age: 73
LOS: 1 days | Discharge: HOME | End: 2023-02-02

## 2023-02-02 DIAGNOSIS — Z98.890 OTHER SPECIFIED POSTPROCEDURAL STATES: Chronic | ICD-10-CM

## 2023-02-02 DIAGNOSIS — C43.9 MALIGNANT MELANOMA OF SKIN, UNSPECIFIED: ICD-10-CM

## 2023-02-02 DIAGNOSIS — C43.9 MALIGNANT MELANOMA OF SKIN, UNSPECIFIED: Chronic | ICD-10-CM

## 2023-02-03 LAB
ALBUMIN SERPL ELPH-MCNC: 4.5 G/DL
ALP BLD-CCNC: 100 U/L
ALT SERPL-CCNC: 23 U/L
ANION GAP SERPL CALC-SCNC: 11 MMOL/L
AST SERPL-CCNC: 23 U/L
BASOPHILS # BLD AUTO: 0.03 K/UL
BASOPHILS NFR BLD AUTO: 0.6 %
BILIRUB SERPL-MCNC: 0.2 MG/DL
BUN SERPL-MCNC: 23 MG/DL
CALCIUM SERPL-MCNC: 9.4 MG/DL
CHLORIDE SERPL-SCNC: 99 MMOL/L
CO2 SERPL-SCNC: 26 MMOL/L
CREAT SERPL-MCNC: 0.57 MG/DL
EGFR: 97 ML/MIN/1.73M2
EOSINOPHIL # BLD AUTO: 0.07 K/UL
EOSINOPHIL NFR BLD AUTO: 1.3 %
GLUCOSE SERPL-MCNC: 115 MG/DL
HCT VFR BLD CALC: 39.3 %
HGB BLD-MCNC: 12.8 G/DL
IMM GRANULOCYTES NFR BLD AUTO: 0.4 %
LYMPHOCYTES # BLD AUTO: 1.13 K/UL
LYMPHOCYTES NFR BLD AUTO: 21.4 %
MAN DIFF?: NORMAL
MCHC RBC-ENTMCNC: 30.5 PG
MCHC RBC-ENTMCNC: 32.6 GM/DL
MCV RBC AUTO: 93.6 FL
MONOCYTES # BLD AUTO: 0.52 K/UL
MONOCYTES NFR BLD AUTO: 9.9 %
NEUTROPHILS # BLD AUTO: 3.5 K/UL
NEUTROPHILS NFR BLD AUTO: 66.4 %
PLATELET # BLD AUTO: 245 K/UL
POTASSIUM SERPL-SCNC: 4.5 MMOL/L
PROT SERPL-MCNC: 7 G/DL
RBC # BLD: 4.2 M/UL
RBC # FLD: 12.5 %
SODIUM SERPL-SCNC: 135 MMOL/L
WBC # FLD AUTO: 5.27 K/UL

## 2023-02-09 ENCOUNTER — APPOINTMENT (OUTPATIENT)
Dept: HEMATOLOGY ONCOLOGY | Facility: CLINIC | Age: 73
End: 2023-02-09
Payer: MEDICARE

## 2023-02-09 VITALS
DIASTOLIC BLOOD PRESSURE: 73 MMHG | RESPIRATION RATE: 16 BRPM | HEART RATE: 67 BPM | OXYGEN SATURATION: 98 % | WEIGHT: 116.84 LBS | BODY MASS INDEX: 20.7 KG/M2 | TEMPERATURE: 97.3 F | SYSTOLIC BLOOD PRESSURE: 121 MMHG

## 2023-02-09 DIAGNOSIS — Z92.89 PERSONAL HISTORY OF OTHER MEDICAL TREATMENT: ICD-10-CM

## 2023-02-09 PROCEDURE — 99213 OFFICE O/P EST LOW 20 MIN: CPT

## 2023-02-09 NOTE — HISTORY OF PRESENT ILLNESS
[Disease: _____________________] : Disease: [unfilled] [T: ___] : T[unfilled] [AJCC Stage: ____] : AJCC Stage: [unfilled] [de-identified] : 2/2021-Patient presented to her dermatologist ([facility])-had full body scan, for screening evaluation-found suspicious lesion right side.\par Had biopsy of right flank lesion with pathology revealing melanoma measuring 0.32 mm.  No vascular or neuro invasion or ulceration.  She subsequently had excision of the area with pathology revealing no residual melanocytic proliferation.  Margins free (portion of skin that 4.7 x 2 x 0.8 cm).  Dermatologist recommended continued surveillance.\par \par 11/16/21-CT chest-3.7 cm superior right lower lobe part solid, cystic and groundglass lesion with 5 mm solid component on mediastinal windows, concerning for an adenocarcinoma spectrum lesion. Numerous other solid and groundglass nodules measuring up to 6 mm can be reassessed at that time.\par \par 11/22/21-PET/CT scan-part solid, cystic and groundglass lesions in superior segment right lower lobe, unchanged and better evaluated on CT from 11/15/2021, with background FDG avidity; this is indeterminate. Malignancy with low FDG avidity like adenocarcinoma in situ is not excluded. Additional scattered subcentimeter bilateral solid and groundglass nodules, too small to characterize are nonspecific. Fibroid uterus with no associated abnormal FDG activity.\par \par 3/7/22-s/p Right VATS robotic-assisted superior segmentectomy with lymph node dissection. 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 Stage 1A1 (T1miN0).\par \par  [de-identified] : melanoma [de-identified] : +Fatigue, OCHOA. Seeing pulmonary MD tomorrow. Sees Dr. Rubi in cardiology f/u next week.\par Saw dermatologist recently-sees regularly.\par No c/o CP, SOB at rest or increased cough. \par No abnormal bruising or bleeding reported. 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

## 2023-02-09 NOTE — PHYSICAL EXAM
[Fully active, able to carry on all pre-disease performance without restriction] : Status 0 - Fully active, able to carry on all pre-disease performance without restriction [Normal] : affect appropriate [de-identified] : right CW surgical scars

## 2023-02-09 NOTE — ASSESSMENT
[FreeTextEntry1] : CT chest results, lab work reviewed.\par 2/2021 Stage T1a (1A) malignant melanoma–no vascular or neural invasion or ulceration on pathology.  Status post wide excision with no residual lesion. Remains on surveillance-continue f/u with dermatology.\par \par 11/2021-RLL nodule on CT, PET/CT imaging. \par 3/7/22-s/p Right VATS robotic-assisted superior segmentectomy with lymph node dissection. 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 Stage 1A1 (T1miN0). Clinically stable at present. Expectant surveillance. 1/9/2023 CT chest-resolution of trace right pleural effusion and decreased density surrounding the right lower lobe staple line.  Stable groundglass and solid lung nodules.  No new or enlarging lung nodule.  Emphysema.  Next CT chest per thoracic surgeon.\par \par Patient was given the opportunity to ask questions.  Her questions have been answered to her apparent satisfaction at this time.

## 2023-02-10 ENCOUNTER — APPOINTMENT (OUTPATIENT)
Dept: PULMONOLOGY | Facility: CLINIC | Age: 73
End: 2023-02-10
Payer: MEDICARE

## 2023-02-10 VITALS
HEART RATE: 63 BPM | OXYGEN SATURATION: 98 % | WEIGHT: 115 LBS | SYSTOLIC BLOOD PRESSURE: 116 MMHG | DIASTOLIC BLOOD PRESSURE: 70 MMHG | HEIGHT: 63 IN | RESPIRATION RATE: 14 BRPM | TEMPERATURE: 97.4 F | BODY MASS INDEX: 20.38 KG/M2

## 2023-02-10 PROCEDURE — 99215 OFFICE O/P EST HI 40 MIN: CPT

## 2023-02-10 RX ORDER — FAMOTIDINE 40 MG/1
40 TABLET, FILM COATED ORAL
Qty: 180 | Refills: 2 | Status: COMPLETED | COMMUNITY
Start: 2022-10-26 | End: 2023-02-10

## 2023-02-10 RX ORDER — SODIUM SULFATE, MAGNESIUM SULFATE, AND POTASSIUM CHLORIDE 17.75; 2.7; 2.25 G/1; G/1; G/1
1479-225-188 TABLET ORAL
Qty: 1 | Refills: 0 | Status: COMPLETED | COMMUNITY
Start: 2022-10-26 | End: 2023-02-10

## 2023-02-10 RX ORDER — CEFUROXIME AXETIL 500 MG/1
500 TABLET ORAL
Qty: 10 | Refills: 0 | Status: COMPLETED | COMMUNITY
Start: 2022-02-22 | End: 2023-02-10

## 2023-02-10 RX ORDER — ALPRAZOLAM 0.25 MG/1
0.25 TABLET ORAL
Qty: 7 | Refills: 0 | Status: COMPLETED | COMMUNITY
Start: 2022-05-20 | End: 2023-02-10

## 2023-02-10 NOTE — REVIEW OF SYSTEMS
[SOB on Exertion] : sob on exertion [Abdominal Pain] : abdominal pain [Thyroid Problem] : thyroid problem [Palpitations] : palpitations [Fever] : no fever [Recent Wt Gain (___ Lbs)] : ~T no recent weight gain [Chills] : no chills [Recent Wt Loss (___ Lbs)] : ~T no recent weight loss [Dry Eyes] : no dry eyes [Sore Throat] : no sore throat [Cough] : no cough [Hemoptysis] : no hemoptysis [Sputum] : no sputum [Dyspnea] : no dyspnea [Wheezing] : no wheezing [Chest Discomfort] : no chest discomfort [GERD] : no gerd [Nausea] : no nausea [Vomiting] : no vomiting [Dysphagia] : no dysphagia [Bleeding] : no bleeding [Myalgias] : no myalgias [Chronic Pain] : no chronic pain [Rash] : no rash [Blood Transfusion] : no blood transfusion [Clotting Disorder/ Frequent bleeding] : no clotting disorder/ frequent bleeding [Diabetes] : no diabetes [Obesity] : no obesity

## 2023-02-10 NOTE — ASSESSMENT
[FreeTextEntry1] : 73y/o  female never smoker\par \par 1- shortness of breath non specific\par 2- 1/9/2023 CT chest-resolution of trace right pleural effusion and decreased density surrounding the right lower lobe staple line. Stable groundglass and solid lung nodules. No new or enlarging lung nodule. Emphysema.\par \par 3- h/o left thoracotomy   tuberculoma - INH x one year ?  compliance \par \par 4-  emphysema on ct      /  cat rescue  8 cats\par 5- hypothyroid / palpitations / anxiety   ( detox unit in past    amphetamine in past) \par 6- vaccinations\par \par \par Recommendations\par 1- allergy panel  food d -dimer  Ig E  HIV  alpha 1 antitrypsin \par 2- cardiology next week\par 3- PFT\par \par return    3 weeks

## 2023-02-10 NOTE — HISTORY OF PRESENT ILLNESS
[Never] : never [Former] : former [TextBox_4] : -  shortness of breath\par \par 73y/o female  born in [place]   never smoker     retired  psychiatric hospital worker   h/o thoracotomy scar  HARESH  tuberculous s/p excision  1975   then  INH x 1 year -  stage IA malignant melanoma s/p excision of flank 2/15/21, lyme disease, anxiety\par \par - h/o   3/7/22-s/p Right VATS robotic-assisted superior segmentectomy with lymph node dissection on 3/7/22. Path of RLL superior segment lobectomy reveals T1miN0 Minimally Invasive Adenocarcinoma, 3.5 cm, G1, Acinar, Negative RACHEL, Negative Margins, Negative LN (0/10).\par \par - h/o 1/9/2023 CT chest-resolution of trace right pleural effusion and decreased density surrounding the right lower lobe staple line. Stable groundglass and solid lung nodules. No new or enlarging lung nodule. Emphysema. \par -plan for  heme onc     f/u  and CT chest per thoracic surgeon.\par \par -feels band like pulling sensation since surgery  3/22    worse with deep inspiration -      has   cat rescue      8 cats \par -feeling is constant  shallow    breathing at times\par - no cough \par -more sob than usual\par - no chest pain \par -never had covid \par

## 2023-02-10 NOTE — PHYSICAL EXAM
[Elongated Uvula] : elongated uvula [III] : Mallampati Class: III [Normal Appearance] : normal appearance [Supple] : supple [No JVD] : no jvd [Normal S1, S2] : normal s1, s2 [Clear to Auscultation Bilaterally] : clear to auscultation bilaterally [Benign] : benign [No Masses] : no masses [Soft] : soft [Normal Bowel Sounds] : normal bowel sounds [Normal Gait] : normal gait [No Clubbing] : no clubbing [No Edema] : no edema [No Rash] : no rash [No Focal Deficits] : no focal deficits [Oriented x3] : oriented x3 [TextBox_2] : pleasant f no distress speaking full sentences no cough   [TextBox_11] : moist no lesion  [TextBox_68] : well healed  left thoracotomy scar  ( 1975)   right   lung well healed     vats scars   bilateral air entry no w

## 2023-02-10 NOTE — PROCEDURE
[FreeTextEntry1] : PFT 1/2022      FVC 2.75 94%  Fev1  1.96   89   R   71    FEF 25-75     1.34  72 %    TLC 5. 08  102%   dlco normal

## 2023-02-13 ENCOUNTER — LABORATORY RESULT (OUTPATIENT)
Age: 73
End: 2023-02-13

## 2023-02-16 ENCOUNTER — NON-APPOINTMENT (OUTPATIENT)
Age: 73
End: 2023-02-16

## 2023-02-16 LAB
A ALTERNATA IGE QN: <0.1 KUA/L
A FUMIGATUS IGE QN: <0.1 KUA/L
A1AT SERPL-MCNC: 132 MG/DL
BARLEY IGE QN: <0.1 KUA/L
C ALBICANS IGE QN: <0.1 KUA/L
C HERBARUM IGE QN: <0.1 KUA/L
CAT DANDER IGE QN: <0.1 KUA/L
CHERRY IGE QN: <0.1 KUA/L
COMMON RAGWEED IGE QN: <0.1 KUA/L
COW MILK IGE QN: 0.11 KUA/L
CRAB IGE QN: <0.1 KUA/L
D FARINAE IGE QN: <0.1 KUA/L
D PTERONYSS IGE QN: <0.1 KUA/L
DEPRECATED A ALTERNATA IGE RAST QL: 0
DEPRECATED A FUMIGATUS IGE RAST QL: 0
DEPRECATED BARLEY IGE RAST QL: 0
DEPRECATED C ALBICANS IGE RAST QL: 0
DEPRECATED C HERBARUM IGE RAST QL: 0
DEPRECATED CAT DANDER IGE RAST QL: 0
DEPRECATED CHERRY IGE RAST QL: 0
DEPRECATED COMMON RAGWEED IGE RAST QL: 0
DEPRECATED COW MILK IGE RAST QL: NORMAL
DEPRECATED CRAB IGE RAST QL: 0
DEPRECATED D DIMER PPP IA-ACNC: 299 NG/ML DDU
DEPRECATED D FARINAE IGE RAST QL: 0
DEPRECATED D PTERONYSS IGE RAST QL: 0
DEPRECATED DOG DANDER IGE RAST QL: 0
DEPRECATED EGG WHITE IGE RAST QL: 0
DEPRECATED M RACEMOSUS IGE RAST QL: 0
DEPRECATED OAT IGE RAST QL: 0
DEPRECATED PEANUT IGE RAST QL: 0
DEPRECATED ROACH IGE RAST QL: 0
DEPRECATED RYE IGE RAST QL: 0
DEPRECATED SOYBEAN IGE RAST QL: 0
DEPRECATED TIMOTHY IGE RAST QL: 0
DEPRECATED WHEAT IGE RAST QL: 0
DEPRECATED WHITE OAK IGE RAST QL: 0
DOG DANDER IGE QN: <0.1 KUA/L
EGG WHITE IGE QN: <0.1 KUA/L
HIV1+2 AB SPEC QL IA.RAPID: NONREACTIVE
M RACEMOSUS IGE QN: <0.1 KUA/L
OAT IGE QN: <0.1 KUA/L
PEANUT IGE QN: <0.1 KUA/L
ROACH IGE QN: <0.1 KUA/L
RYE IGE QN: <0.1 KUA/L
SOYBEAN IGE QN: <0.1 KUA/L
TIMOTHY IGE QN: <0.1 KUA/L
TOTAL IGE SMQN RAST: 4 KU/L
WHEAT IGE QN: <0.1 KUA/L
WHITE OAK IGE QN: <0.1 KUA/L

## 2023-02-18 ENCOUNTER — NON-APPOINTMENT (OUTPATIENT)
Age: 73
End: 2023-02-18

## 2023-02-22 ENCOUNTER — NON-APPOINTMENT (OUTPATIENT)
Age: 73
End: 2023-02-22

## 2023-03-01 ENCOUNTER — NON-APPOINTMENT (OUTPATIENT)
Age: 73
End: 2023-03-01

## 2023-03-01 ENCOUNTER — APPOINTMENT (OUTPATIENT)
Dept: CARDIOLOGY | Facility: CLINIC | Age: 73
End: 2023-03-01
Payer: MEDICARE

## 2023-03-01 VITALS
RESPIRATION RATE: 17 BRPM | DIASTOLIC BLOOD PRESSURE: 67 MMHG | WEIGHT: 110 LBS | HEIGHT: 63 IN | HEART RATE: 61 BPM | OXYGEN SATURATION: 100 % | SYSTOLIC BLOOD PRESSURE: 123 MMHG | BODY MASS INDEX: 19.49 KG/M2 | TEMPERATURE: 97.1 F

## 2023-03-01 PROCEDURE — 99214 OFFICE O/P EST MOD 30 MIN: CPT | Mod: 25

## 2023-03-01 PROCEDURE — 93000 ELECTROCARDIOGRAM COMPLETE: CPT

## 2023-03-03 NOTE — REASON FOR VISIT
[Symptom and Test Evaluation] : symptom and test evaluation [Abnormal Test Result] : an abnormal test result [FreeTextEntry1] : Sahla tells me that surgery is planned on 125 she is saying [place] a CAT scan is planned one week before Deondre feels that if the lesion is stable and surgery may be postponed in the rescan planned in 3 to 4 months Shala notes a cough covid was negative she feels occasional odd sensation under the rib cage over the past two years which is unexplained when she takes a deep breath\par \par Update 6/10/2022\par Shala was recently seen in hospital with some shortness of breath the D-dimer was borderline elevated and a CTA to rule out pulmonary embolism was negative she inquires about some pleuritic -like pains which most likely represent postoperative inflammation

## 2023-03-03 NOTE — HISTORY OF PRESENT ILLNESS
[FreeTextEntry1] : Shala tells me that surgery is planned on 1/25. A CAT scan is plan one week prior. If the lesion is stable , then surgery may be postponed only to rescanned in 3 to 4 months. She notes cough however covid 19 testing was negative. she feels an occasional odd sensation under the rib cage over the past two years which is unexplained when she takes a deep breath.she  comes today for clarification of   Her   overall cardiovascular risk.\par she was advised to undergo a complete cardiac evaluation.she denies current chest pains shortness of breath or loss of consciousnes.\par \par Update 3/1/2023\par Shala was recently noted to have a borderline elevated D-dimer at 296 she is to undergo pulmonary function testing she underwent a CAT scan she is following with Dr. Escobar radiology read report of emphysema she does have shortness of breath with exertion she suffers from fatigue and shortness of breath she is postoperative\par \par \par \par

## 2023-03-03 NOTE — ASSESSMENT
[FreeTextEntry1] : I have asked   Shala   to undergo detailed cardiac testing in order to evaluate her overall cardiovascular risk.\par An assessment of both structural and functional heart disease was recommended to the patient.\par In this regard, a stress test in cardiac MRI were advised to the patient. we are concerned about melanoma and metastatic spread to the pericardium. The stresses has returned satisfactory although the achieved heart rate was non-diagnostic. Without evidence or recent infarction overt heart failure or unstable angina and based upon a satisfactory stress test Ms. Hale may undergo planned evaluation of the lung nodule which constitutes hi risk surgery in a patient with intermediate cardiac risk at an ASA class II or III anesthesia risk\par \par Addendum 6/10/2022\par The D-dimer was borderline given Shala's age and in fact a CTA was negative for pulmonary embolism we will continue to treat empirically with nonsteroidals and anti-inflammatories for some pleuritic -like pains follow-up with Dr. Hendrix for further oncology evaluations in the second setting of a carcinoma which has been resected\par \par Addendum 3/1/2023\par Given complaints of dyspnea on exertion and a family history of coronary disease I have asked Shala to undergo a cardiac CTA in order to exclude underlying coronary disease she has never been a smoker and the finding of emphysema is unclear previous noninvasive ischemic work-up has been unrevealing and an imaging procedure would be helpful here such as a cardiac CTA.  Blood work requested including brain naturetic peptide\par \par medical necessity\par this is a high risk encounter based upon the need to assess postoperative atypical chest pains in the setting of a borderline biomarker D-dimer\par \par

## 2023-03-09 ENCOUNTER — OUTPATIENT (OUTPATIENT)
Dept: OUTPATIENT SERVICES | Facility: HOSPITAL | Age: 73
LOS: 1 days | End: 2023-03-09
Payer: MEDICARE

## 2023-03-09 DIAGNOSIS — Z98.890 OTHER SPECIFIED POSTPROCEDURAL STATES: Chronic | ICD-10-CM

## 2023-03-09 DIAGNOSIS — C43.9 MALIGNANT MELANOMA OF SKIN, UNSPECIFIED: Chronic | ICD-10-CM

## 2023-03-09 DIAGNOSIS — Z00.00 ENCOUNTER FOR GENERAL ADULT MEDICAL EXAMINATION WITHOUT ABNORMAL FINDINGS: ICD-10-CM

## 2023-03-09 PROCEDURE — 94010 BREATHING CAPACITY TEST: CPT | Mod: 26

## 2023-03-09 PROCEDURE — 94060 EVALUATION OF WHEEZING: CPT

## 2023-03-10 ENCOUNTER — NON-APPOINTMENT (OUTPATIENT)
Age: 73
End: 2023-03-10

## 2023-03-17 ENCOUNTER — APPOINTMENT (OUTPATIENT)
Dept: PULMONOLOGY | Facility: CLINIC | Age: 73
End: 2023-03-17
Payer: MEDICARE

## 2023-03-17 VITALS
HEART RATE: 60 BPM | OXYGEN SATURATION: 97 % | BODY MASS INDEX: 19.49 KG/M2 | WEIGHT: 110 LBS | HEIGHT: 63 IN | SYSTOLIC BLOOD PRESSURE: 120 MMHG | DIASTOLIC BLOOD PRESSURE: 74 MMHG | TEMPERATURE: 97.1 F

## 2023-03-17 PROCEDURE — 99213 OFFICE O/P EST LOW 20 MIN: CPT

## 2023-03-17 NOTE — ASSESSMENT
[FreeTextEntry1] : 71y/o  female never smoker\par \par 1- shortness of breath non specific\par 2- 1/9/2023 CT chest-resolution of trace right pleural effusion and decreased density surrounding the right lower lobe staple line. Stable groundglass and solid lung nodules. No new or enlarging lung nodule. Emphysema.\par \par 3- h/o left thoracotomy   tuberculoma - INH x one year ?  compliance \par \par 4-  emphysema on ct      /  cat rescue  8 cats\par 5- hypothyroid / palpitations / anxiety   ( detox unit in past    amphetamine in past) \par 6- vaccinations\par \par \par Recommendations\par 1- repeat d dimer cxr \par 2- cardiology next week\par 3- GI \par 4- refuses  trial of inhaler \par \par return   4 months if worse pain to ED

## 2023-03-17 NOTE — HISTORY OF PRESENT ILLNESS
[Never] : never [Former] : former [TextBox_4] : -  shortness of breath\par \par 71y/o female  born in [place]   never smoker     retired  psychiatric hospital worker   h/o thoracotomy scar  HARESH  tuberculous s/p excision  1975   then  INH x 1 year -  stage IA malignant melanoma s/p excision of flank 2/15/21, lyme disease, anxiety\par \par - h/o   3/7/22-s/p Right VATS robotic-assisted superior segmentectomy with lymph node dissection on 3/7/22. Path of RLL superior segment lobectomy reveals T1miN0 Minimally Invasive Adenocarcinoma, 3.5 cm, G1, Acinar, Negative RACHEL, Negative Margins, Negative LN (0/10).\par \par - h/o 1/9/2023 CT chest-resolution of trace right pleural effusion and decreased density surrounding the right lower lobe staple line. Stable groundglass and solid lung nodules. No new or enlarging lung nodule. Emphysema. \par -plan for  heme onc     f/u  and CT chest per thoracic surgeon.\par \par -feels band like pulling sensation since surgery  3/22    worse with deep inspiration -      has   cat rescue      8 cats \par -feeling is constant  shallow    breathing at times\par - no cough \par -more sob than usual\par - no chest pain \par -never had covid \par \par 3/17/2023\par 71y/o female   non smoker  \par -f/u sob\par - d dimer neg for age  neg allergies\par - still complaining of pressure  with deep breath  same area   epigastric travels along  abdomen \par - no cough  no hemoptysis \par

## 2023-03-17 NOTE — PHYSICAL EXAM
[Elongated Uvula] : elongated uvula [II] : Mallampati Class: II [Supple] : supple [No Neck Mass] : no neck mass [No JVD] : no jvd [Normal S1, S2] : normal s1, s2 [Clear to Auscultation Bilaterally] : clear to auscultation bilaterally [Not Tender] : not tender [Soft] : soft [Normal Bowel Sounds] : normal bowel sounds [Normal Gait] : normal gait [No Clubbing] : no clubbing [No Edema] : no edema [No Rash] : no rash [No Motor Deficits] : no motor deficits [Normal Affect] : normal affect [TextBox_2] : pleasant    female no distress speaking  full   sentences   no cough  [TextBox_11] : normal     no lesions   [TextBox_80] : well healed     vats scar   [TextBox_89] : felt   pain on ruq

## 2023-03-17 NOTE — REVIEW OF SYSTEMS
[SOB on Exertion] : sob on exertion [Abdominal Pain] : abdominal pain [Thyroid Problem] : thyroid problem [Fever] : no fever [Recent Wt Gain (___ Lbs)] : ~T no recent weight gain [Chills] : no chills [Recent Wt Loss (___ Lbs)] : ~T no recent weight loss [Dry Eyes] : no dry eyes [Sore Throat] : no sore throat [Cough] : no cough [Hemoptysis] : no hemoptysis [Sputum] : no sputum [Dyspnea] : no dyspnea [Wheezing] : no wheezing [Chest Discomfort] : no chest discomfort [Palpitations] : no palpitations [GERD] : no gerd [Nausea] : no nausea [Vomiting] : no vomiting [Dysphagia] : no dysphagia [Bleeding] : no bleeding [Myalgias] : no myalgias [Chronic Pain] : no chronic pain [Rash] : no rash [Blood Transfusion] : no blood transfusion [Clotting Disorder/ Frequent bleeding] : no clotting disorder/ frequent bleeding [Diabetes] : no diabetes [Obesity] : no obesity

## 2023-03-21 ENCOUNTER — APPOINTMENT (OUTPATIENT)
Dept: CARDIOLOGY | Facility: CLINIC | Age: 73
End: 2023-03-21
Payer: MEDICARE

## 2023-03-21 PROCEDURE — 93306 TTE W/DOPPLER COMPLETE: CPT

## 2023-03-22 ENCOUNTER — OUTPATIENT (OUTPATIENT)
Dept: OUTPATIENT SERVICES | Facility: HOSPITAL | Age: 73
LOS: 1 days | End: 2023-03-22
Payer: MEDICARE

## 2023-03-22 ENCOUNTER — APPOINTMENT (OUTPATIENT)
Dept: RADIOLOGY | Facility: HOSPITAL | Age: 73
End: 2023-03-22
Payer: MEDICARE

## 2023-03-22 DIAGNOSIS — C43.9 MALIGNANT MELANOMA OF SKIN, UNSPECIFIED: Chronic | ICD-10-CM

## 2023-03-22 DIAGNOSIS — J90 PLEURAL EFFUSION, NOT ELSEWHERE CLASSIFIED: ICD-10-CM

## 2023-03-22 DIAGNOSIS — R06.02 SHORTNESS OF BREATH: ICD-10-CM

## 2023-03-22 DIAGNOSIS — Z98.890 OTHER SPECIFIED POSTPROCEDURAL STATES: Chronic | ICD-10-CM

## 2023-03-22 DIAGNOSIS — J43.9 EMPHYSEMA, UNSPECIFIED: ICD-10-CM

## 2023-03-22 LAB
NT-PROBNP SERPL-MCNC: 98 PG/ML
TSH SERPL-ACNC: 0.63 UIU/ML

## 2023-03-22 PROCEDURE — 71046 X-RAY EXAM CHEST 2 VIEWS: CPT

## 2023-03-22 PROCEDURE — 71046 X-RAY EXAM CHEST 2 VIEWS: CPT | Mod: 26

## 2023-03-23 ENCOUNTER — NON-APPOINTMENT (OUTPATIENT)
Age: 73
End: 2023-03-23

## 2023-03-24 ENCOUNTER — NON-APPOINTMENT (OUTPATIENT)
Age: 73
End: 2023-03-24

## 2023-03-24 LAB
ALBUMIN SERPL ELPH-MCNC: 4.3 G/DL
ALP BLD-CCNC: 98 U/L
ALT SERPL-CCNC: 19 U/L
ANION GAP SERPL CALC-SCNC: 13 MMOL/L
AST SERPL-CCNC: 20 U/L
BASOPHILS # BLD AUTO: 0.03 K/UL
BASOPHILS NFR BLD AUTO: 0.5 %
BILIRUB SERPL-MCNC: 0.2 MG/DL
BUN SERPL-MCNC: 17 MG/DL
CALCIUM SERPL-MCNC: 9.5 MG/DL
CHLORIDE SERPL-SCNC: 98 MMOL/L
CO2 SERPL-SCNC: 23 MMOL/L
CREAT SERPL-MCNC: 0.56 MG/DL
EGFR: 98 ML/MIN/1.73M2
EOSINOPHIL # BLD AUTO: 0.11 K/UL
EOSINOPHIL NFR BLD AUTO: 1.8 %
ERYTHROCYTE [SEDIMENTATION RATE] IN BLOOD BY WESTERGREN METHOD: 6 MM/HR
GLUCOSE SERPL-MCNC: 140 MG/DL
HCT VFR BLD CALC: 38.1 %
HGB BLD-MCNC: 12.6 G/DL
IMM GRANULOCYTES NFR BLD AUTO: 0.2 %
LYMPHOCYTES # BLD AUTO: 1.18 K/UL
LYMPHOCYTES NFR BLD AUTO: 19.3 %
MAN DIFF?: NORMAL
MCHC RBC-ENTMCNC: 30.7 PG
MCHC RBC-ENTMCNC: 33.1 GM/DL
MCV RBC AUTO: 92.9 FL
MONOCYTES # BLD AUTO: 0.51 K/UL
MONOCYTES NFR BLD AUTO: 8.4 %
NEUTROPHILS # BLD AUTO: 4.26 K/UL
NEUTROPHILS NFR BLD AUTO: 69.8 %
PLATELET # BLD AUTO: 234 K/UL
POTASSIUM SERPL-SCNC: 4.3 MMOL/L
PROT SERPL-MCNC: 6.6 G/DL
RBC # BLD: 4.1 M/UL
RBC # FLD: 12.7 %
SODIUM SERPL-SCNC: 134 MMOL/L
TROPONIN I SERPL-MCNC: <0.01 NG/ML
WBC # FLD AUTO: 6.1 K/UL

## 2023-03-27 ENCOUNTER — APPOINTMENT (OUTPATIENT)
Dept: CT IMAGING | Facility: CLINIC | Age: 73
End: 2023-03-27
Payer: MEDICARE

## 2023-03-27 ENCOUNTER — OUTPATIENT (OUTPATIENT)
Dept: OUTPATIENT SERVICES | Facility: HOSPITAL | Age: 73
LOS: 1 days | End: 2023-03-27
Payer: MEDICARE

## 2023-03-27 DIAGNOSIS — Z98.890 OTHER SPECIFIED POSTPROCEDURAL STATES: Chronic | ICD-10-CM

## 2023-03-27 DIAGNOSIS — Z00.8 ENCOUNTER FOR OTHER GENERAL EXAMINATION: ICD-10-CM

## 2023-03-27 DIAGNOSIS — C43.9 MALIGNANT MELANOMA OF SKIN, UNSPECIFIED: Chronic | ICD-10-CM

## 2023-03-27 DIAGNOSIS — R06.02 SHORTNESS OF BREATH: ICD-10-CM

## 2023-03-27 PROCEDURE — 75574 CT ANGIO HRT W/3D IMAGE: CPT

## 2023-03-27 PROCEDURE — 75574 CT ANGIO HRT W/3D IMAGE: CPT | Mod: 26

## 2023-03-29 ENCOUNTER — NON-APPOINTMENT (OUTPATIENT)
Age: 73
End: 2023-03-29

## 2023-03-29 DIAGNOSIS — I65.23 OCCLUSION AND STENOSIS OF BILATERAL CAROTID ARTERIES: ICD-10-CM

## 2023-04-03 LAB — DEPRECATED D DIMER PPP IA-ACNC: 278 NG/ML DDU

## 2023-04-03 RX ORDER — ALBUTEROL SULFATE 90 UG/1
108 (90 BASE) INHALANT RESPIRATORY (INHALATION)
Qty: 1 | Refills: 5 | Status: ACTIVE | COMMUNITY
Start: 2023-04-03 | End: 1900-01-01

## 2023-04-15 ENCOUNTER — OUTPATIENT (OUTPATIENT)
Dept: OUTPATIENT SERVICES | Facility: HOSPITAL | Age: 73
LOS: 1 days | End: 2023-04-15
Payer: MEDICARE

## 2023-04-15 ENCOUNTER — APPOINTMENT (OUTPATIENT)
Dept: ULTRASOUND IMAGING | Facility: HOSPITAL | Age: 73
End: 2023-04-15
Payer: MEDICARE

## 2023-04-15 DIAGNOSIS — Z98.890 OTHER SPECIFIED POSTPROCEDURAL STATES: Chronic | ICD-10-CM

## 2023-04-15 DIAGNOSIS — C43.9 MALIGNANT MELANOMA OF SKIN, UNSPECIFIED: Chronic | ICD-10-CM

## 2023-04-15 DIAGNOSIS — R10.13 EPIGASTRIC PAIN: ICD-10-CM

## 2023-04-15 PROCEDURE — 76700 US EXAM ABDOM COMPLETE: CPT

## 2023-04-15 PROCEDURE — 76700 US EXAM ABDOM COMPLETE: CPT | Mod: 26

## 2023-04-25 ENCOUNTER — APPOINTMENT (OUTPATIENT)
Dept: CARDIOLOGY | Facility: CLINIC | Age: 73
End: 2023-04-25
Payer: MEDICARE

## 2023-04-25 PROCEDURE — 93880 EXTRACRANIAL BILAT STUDY: CPT

## 2023-04-28 ENCOUNTER — NON-APPOINTMENT (OUTPATIENT)
Age: 73
End: 2023-04-28

## 2023-04-28 ENCOUNTER — APPOINTMENT (OUTPATIENT)
Dept: GASTROENTEROLOGY | Facility: CLINIC | Age: 73
End: 2023-04-28
Payer: MEDICARE

## 2023-04-28 VITALS
TEMPERATURE: 98 F | WEIGHT: 110 LBS | RESPIRATION RATE: 16 BRPM | SYSTOLIC BLOOD PRESSURE: 131 MMHG | DIASTOLIC BLOOD PRESSURE: 79 MMHG | HEIGHT: 63 IN | OXYGEN SATURATION: 65 % | HEART RATE: 65 BPM | BODY MASS INDEX: 19.49 KG/M2

## 2023-04-28 DIAGNOSIS — R13.10 DYSPHAGIA, UNSPECIFIED: ICD-10-CM

## 2023-04-28 DIAGNOSIS — Z91.89 OTHER SPECIFIED PERSONAL RISK FACTORS, NOT ELSEWHERE CLASSIFIED: ICD-10-CM

## 2023-04-28 PROCEDURE — 99215 OFFICE O/P EST HI 40 MIN: CPT

## 2023-04-28 NOTE — PHYSICAL EXAM
[Alert] : alert [Normal Voice/Communication] : normal voice/communication [Healthy Appearing] : healthy appearing [No Acute Distress] : no acute distress [Sclera] : the sclera and conjunctiva were normal [Hearing Threshold Finger Rub Not [place]] : hearing was normal [Normal Lips/Gums] : the lips and gums were normal [Oropharynx] : the oropharynx was normal [Normal Appearance] : the appearance of the neck was normal [No Neck Mass] : no neck mass was observed [No Respiratory Distress] : no respiratory distress [No Acc Muscle Use] : no accessory muscle use [Respiration, Rhythm And Depth] : normal respiratory rhythm and effort [Auscultation Breath Sounds / Voice Sounds] : lungs were clear to auscultation bilaterally [Heart Rate And Rhythm] : heart rate was normal and rhythm regular [Normal S1, S2] : normal S1 and S2 [Murmurs] : no murmurs [Bowel Sounds] : normal bowel sounds [Abdomen Tenderness] : non-tender [No Masses] : no abdominal mass palpated [Abdomen Soft] : soft [] : no hepatosplenomegaly [Oriented To Time, Place, And Person] : oriented to person, place, and time

## 2023-04-28 NOTE — ASSESSMENT
[FreeTextEntry1] : Impression: \par \par Band like epig pain with pancreatitis in ddx, gallstones or otherwise.\par Excess bloating: trial of Iberogas.\par Trial of famotidine.\par \par The patient seems to have longstanding functional GI issues as well.  I have prescribed cognitive behavioral therapy.\par \par I have, again, spent a great deal of time discussing the role of daily high-intensity exercise with the patient. We discussed behavior modification strategies to institute this habit.   I have discussed nutrition in great detail including consuming vegetable fibers on a daily basis and limiting simple carbohydrates 5 days per week.  We have also reviewed the benefits of soluble fiber supplementation, including (but not limited to), favorable effects on lipid profile, weight control and the salutary effects on colonic microbiota. We reviewed the effects of such daily habits on metabolism and the metabolic set point resulting in a healthy weight, decreased pain sensitivity (effecting the GI tract), bowel habits and other aspects of health.  I recommended a mindful meditation practice and reviewed behavior modification strategies.\par \par I have, again, asked the patient to schedule both an upper endoscopy and a colonoscopy in the near future. I have reviewed the risks benefits and alternatives and provide the patient literature to read.  I have emphasized the need to have a good clean out including adequate fluid intake and avoiding seeds for one week prior to the procedure.  She prefers to defer at this time but will discuss at follow-up in 1 month.

## 2023-06-05 ENCOUNTER — APPOINTMENT (OUTPATIENT)
Dept: GASTROENTEROLOGY | Facility: HOSPITAL | Age: 73
End: 2023-06-05

## 2023-06-12 NOTE — ED ADULT NURSE NOTE - CAS DISCH TRANSFER METHOD
The LIONEL and I have discussed this patient's history, physical exam and treatment plan.  I provided a substantive portion of the care of this patient.  I have reviewed the documentation and personally had a face to face interaction with the patient and personally performed the physical exam, in its entirety.  I affirm the documentation and agree with the treatment and plan.  The following describes my personal findings.      The patient presents brought by EMS after wife noticed patient had left arm and leg weaker than usual for him onset approximately 2 PM today and lasting several hours.  Patient and family report patient's unilateral weakness different than usual for him is no different than his baseline.  Patient denies new visual disturbance, speech disturbance.    Comprehensive Physical exam:  Patient is nontoxic appearing  awake, alert  HEENT: normocephalic, atraumatic  Neck: No JVD, no goiter, no pain with ROM  Pulmonary: Nontachypneic, breath sounds are well bilaterally  cardiovascular: Nontachycardic  Abdomen: Soft, nontender  musculoskeletal: Good range of motion, pulse, sensation x4  Neuro/psychiatric:calm, appropriate, cooperative  Skin:warm, dry    EKG          EKG time: 916  Rhythm/Rate: Sinus rhythm, rate in the 80s  P waves and W[place]: Normal P waves, normal FERNANDA's  QRS, axis: Poor R wave progression  ST and T waves: Unremarkable ST/T wave findings    Interpreted Contemporaneously by me, independently viewed  Minimally changed compared to prior 2/14/2020    Patient was wearing facemask when I entered the room and throughout our encounter. Full protective equipment was worn throughout this patient encounter including a face mask, eye protection and gloves. Hand hygiene was performed before donning protective equipment and after removal when leaving the room.           Michelle Juárez MD  06/12/23 0459     Private car

## 2023-07-01 ENCOUNTER — NON-APPOINTMENT (OUTPATIENT)
Age: 73
End: 2023-07-01

## 2023-07-02 ENCOUNTER — EMERGENCY (EMERGENCY)
Facility: HOSPITAL | Age: 73
LOS: 1 days | Discharge: HOME | End: 2023-07-02
Attending: EMERGENCY MEDICINE | Admitting: EMERGENCY MEDICINE
Payer: MEDICARE

## 2023-07-02 VITALS — RESPIRATION RATE: 18 BRPM | DIASTOLIC BLOOD PRESSURE: 66 MMHG | HEART RATE: 69 BPM | SYSTOLIC BLOOD PRESSURE: 122 MMHG

## 2023-07-02 VITALS
DIASTOLIC BLOOD PRESSURE: 81 MMHG | OXYGEN SATURATION: 98 % | SYSTOLIC BLOOD PRESSURE: 139 MMHG | TEMPERATURE: 98 F | HEART RATE: 73 BPM | WEIGHT: 119.05 LBS | RESPIRATION RATE: 18 BRPM

## 2023-07-02 DIAGNOSIS — C43.9 MALIGNANT MELANOMA OF SKIN, UNSPECIFIED: Chronic | ICD-10-CM

## 2023-07-02 DIAGNOSIS — Z98.890 OTHER SPECIFIED POSTPROCEDURAL STATES: Chronic | ICD-10-CM

## 2023-07-02 LAB
ALBUMIN SERPL ELPH-MCNC: 3.9 G/DL — SIGNIFICANT CHANGE UP (ref 3.3–5)
ALP SERPL-CCNC: 90 U/L — SIGNIFICANT CHANGE UP (ref 40–120)
ALT FLD-CCNC: 31 U/L — SIGNIFICANT CHANGE UP (ref 10–45)
ANION GAP SERPL CALC-SCNC: 6 MMOL/L — SIGNIFICANT CHANGE UP (ref 5–17)
APPEARANCE UR: CLEAR — SIGNIFICANT CHANGE UP
APTT BLD: 30.4 SEC — SIGNIFICANT CHANGE UP (ref 27.5–35.5)
AST SERPL-CCNC: 20 U/L — SIGNIFICANT CHANGE UP (ref 10–40)
BASOPHILS # BLD AUTO: 0.02 K/UL — SIGNIFICANT CHANGE UP (ref 0–0.2)
BASOPHILS NFR BLD AUTO: 0.3 % — SIGNIFICANT CHANGE UP (ref 0–2)
BILIRUB SERPL-MCNC: 0.5 MG/DL — SIGNIFICANT CHANGE UP (ref 0.2–1.2)
BILIRUB UR-MCNC: NEGATIVE — SIGNIFICANT CHANGE UP
BUN SERPL-MCNC: 20 MG/DL — SIGNIFICANT CHANGE UP (ref 7–23)
CALCIUM SERPL-MCNC: 9 MG/DL — SIGNIFICANT CHANGE UP (ref 8.4–10.5)
CHLORIDE SERPL-SCNC: 97 MMOL/L — SIGNIFICANT CHANGE UP (ref 96–108)
CO2 SERPL-SCNC: 30 MMOL/L — SIGNIFICANT CHANGE UP (ref 22–31)
COLOR SPEC: YELLOW — SIGNIFICANT CHANGE UP
CREAT SERPL-MCNC: 0.49 MG/DL — LOW (ref 0.5–1.3)
D DIMER BLD IA.RAPID-MCNC: 325 NG/ML DDU — HIGH
DIFF PNL FLD: NEGATIVE — SIGNIFICANT CHANGE UP
EGFR: 100 ML/MIN/1.73M2 — SIGNIFICANT CHANGE UP
EOSINOPHIL # BLD AUTO: 0.04 K/UL — SIGNIFICANT CHANGE UP (ref 0–0.5)
EOSINOPHIL NFR BLD AUTO: 0.5 % — SIGNIFICANT CHANGE UP (ref 0–6)
GLUCOSE SERPL-MCNC: 107 MG/DL — HIGH (ref 70–99)
GLUCOSE UR QL: NEGATIVE MG/DL — SIGNIFICANT CHANGE UP
HCT VFR BLD CALC: 37.5 % — SIGNIFICANT CHANGE UP (ref 34.5–45)
HGB BLD-MCNC: 12.7 G/DL — SIGNIFICANT CHANGE UP (ref 11.5–15.5)
IMM GRANULOCYTES NFR BLD AUTO: 0.3 % — SIGNIFICANT CHANGE UP (ref 0–0.9)
INR BLD: 0.94 RATIO — SIGNIFICANT CHANGE UP (ref 0.88–1.16)
KETONES UR-MCNC: NEGATIVE MG/DL — SIGNIFICANT CHANGE UP
LACTATE SERPL-SCNC: 0.7 MMOL/L — SIGNIFICANT CHANGE UP (ref 0.7–2)
LEUKOCYTE ESTERASE UR-ACNC: NEGATIVE — SIGNIFICANT CHANGE UP
LYMPHOCYTES # BLD AUTO: 0.44 K/UL — LOW (ref 1–3.3)
LYMPHOCYTES # BLD AUTO: 5.9 % — LOW (ref 13–44)
MCHC RBC-ENTMCNC: 31.1 PG — SIGNIFICANT CHANGE UP (ref 27–34)
MCHC RBC-ENTMCNC: 33.9 GM/DL — SIGNIFICANT CHANGE UP (ref 32–36)
MCV RBC AUTO: 91.9 FL — SIGNIFICANT CHANGE UP (ref 80–100)
MONOCYTES # BLD AUTO: 0.38 K/UL — SIGNIFICANT CHANGE UP (ref 0–0.9)
MONOCYTES NFR BLD AUTO: 5.1 % — SIGNIFICANT CHANGE UP (ref 2–14)
NEUTROPHILS # BLD AUTO: 6.6 K/UL — SIGNIFICANT CHANGE UP (ref 1.8–7.4)
NEUTROPHILS NFR BLD AUTO: 87.9 % — HIGH (ref 43–77)
NITRITE UR-MCNC: NEGATIVE — SIGNIFICANT CHANGE UP
NRBC # BLD: 0 /100 WBCS — SIGNIFICANT CHANGE UP (ref 0–0)
PH UR: 7.5 — SIGNIFICANT CHANGE UP (ref 5–8)
PLATELET # BLD AUTO: 215 K/UL — SIGNIFICANT CHANGE UP (ref 150–400)
POTASSIUM SERPL-MCNC: 3.8 MMOL/L — SIGNIFICANT CHANGE UP (ref 3.5–5.3)
POTASSIUM SERPL-SCNC: 3.8 MMOL/L — SIGNIFICANT CHANGE UP (ref 3.5–5.3)
PROT SERPL-MCNC: 7.1 G/DL — SIGNIFICANT CHANGE UP (ref 6–8.3)
PROT UR-MCNC: NEGATIVE MG/DL — SIGNIFICANT CHANGE UP
PROTHROM AB SERPL-ACNC: 10.9 SEC — SIGNIFICANT CHANGE UP (ref 10.5–13.4)
RAPID RVP RESULT: SIGNIFICANT CHANGE UP
RBC # BLD: 4.08 M/UL — SIGNIFICANT CHANGE UP (ref 3.8–5.2)
RBC # FLD: 12.3 % — SIGNIFICANT CHANGE UP (ref 10.3–14.5)
SARS-COV-2 RNA SPEC QL NAA+PROBE: SIGNIFICANT CHANGE UP
SODIUM SERPL-SCNC: 133 MMOL/L — LOW (ref 135–145)
SP GR SPEC: 1.01 — SIGNIFICANT CHANGE UP (ref 1–1.03)
TROPONIN I, HIGH SENSITIVITY RESULT: 5.6 NG/L — SIGNIFICANT CHANGE UP
TROPONIN I, HIGH SENSITIVITY RESULT: 7.4 NG/L — SIGNIFICANT CHANGE UP
UROBILINOGEN FLD QL: 0.2 MG/DL — SIGNIFICANT CHANGE UP (ref 0.2–1)
WBC # BLD: 7.5 K/UL — SIGNIFICANT CHANGE UP (ref 3.8–10.5)
WBC # FLD AUTO: 7.5 K/UL — SIGNIFICANT CHANGE UP (ref 3.8–10.5)

## 2023-07-02 PROCEDURE — 71275 CT ANGIOGRAPHY CHEST: CPT | Mod: 26,MA

## 2023-07-02 PROCEDURE — 85379 FIBRIN DEGRADATION QUANT: CPT

## 2023-07-02 PROCEDURE — 93005 ELECTROCARDIOGRAM TRACING: CPT

## 2023-07-02 PROCEDURE — 99285 EMERGENCY DEPT VISIT HI MDM: CPT

## 2023-07-02 PROCEDURE — 93010 ELECTROCARDIOGRAM REPORT: CPT

## 2023-07-02 PROCEDURE — 71045 X-RAY EXAM CHEST 1 VIEW: CPT

## 2023-07-02 PROCEDURE — 0225U NFCT DS DNA&RNA 21 SARSCOV2: CPT

## 2023-07-02 PROCEDURE — 87040 BLOOD CULTURE FOR BACTERIA: CPT

## 2023-07-02 PROCEDURE — 83605 ASSAY OF LACTIC ACID: CPT

## 2023-07-02 PROCEDURE — 85610 PROTHROMBIN TIME: CPT

## 2023-07-02 PROCEDURE — 99285 EMERGENCY DEPT VISIT HI MDM: CPT | Mod: 25

## 2023-07-02 PROCEDURE — 85730 THROMBOPLASTIN TIME PARTIAL: CPT

## 2023-07-02 PROCEDURE — 80053 COMPREHEN METABOLIC PANEL: CPT

## 2023-07-02 PROCEDURE — 71045 X-RAY EXAM CHEST 1 VIEW: CPT | Mod: 26

## 2023-07-02 PROCEDURE — 84484 ASSAY OF TROPONIN QUANT: CPT

## 2023-07-02 PROCEDURE — 87086 URINE CULTURE/COLONY COUNT: CPT

## 2023-07-02 PROCEDURE — 36415 COLL VENOUS BLD VENIPUNCTURE: CPT

## 2023-07-02 PROCEDURE — 85025 COMPLETE CBC W/AUTO DIFF WBC: CPT

## 2023-07-02 PROCEDURE — 71275 CT ANGIOGRAPHY CHEST: CPT | Mod: MA

## 2023-07-02 RX ORDER — ACETAMINOPHEN 500 MG
650 TABLET ORAL ONCE
Refills: 0 | Status: COMPLETED | OUTPATIENT
Start: 2023-07-02 | End: 2023-07-02

## 2023-07-02 RX ORDER — LEVOTHYROXINE SODIUM 125 MCG
1 TABLET ORAL
Qty: 0 | Refills: 0 | DISCHARGE

## 2023-07-02 RX ORDER — SODIUM CHLORIDE 9 MG/ML
1000 INJECTION INTRAMUSCULAR; INTRAVENOUS; SUBCUTANEOUS ONCE
Refills: 0 | Status: COMPLETED | OUTPATIENT
Start: 2023-07-02 | End: 2023-07-02

## 2023-07-02 RX ADMIN — SODIUM CHLORIDE 1000 MILLILITER(S): 9 INJECTION INTRAMUSCULAR; INTRAVENOUS; SUBCUTANEOUS at 15:57

## 2023-07-02 RX ADMIN — Medication 650 MILLIGRAM(S): at 15:57

## 2023-07-02 NOTE — ED PROVIDER NOTE - CLINICAL SUMMARY MEDICAL DECISION MAKING FREE TEXT BOX
73-year-old female past medical history of lung cancer, thoracotomy, cervical cancer, vertigo, sent to the ED from urgent care for chest pressure described as 7 out of 10, pleuritic type pain X today.  Also reporting headache.  Patient reports she has had similar type of chest pain in the past where she sees cardiology Dr. Rubi for but reports today and felt a little worse.  She denies any shortness of breath with it, nausea vomiting diarrhea, abdominal pain, URI symptoms, headache dizziness, fever chills, sick contacts or recent travel. PE as noted above. rectal temp 100.4. ED sepsis labs ordered.  labs reviewed, d-dimer 325.  UA neg. CXR images reviewed with Dr. mulligan- KIMANI. CTA chest pending for r/o PE. Patient does not meet sepsis criteria at this time still sepsis fluids and antibiotics were not ordered 73-year-old female past medical history of lung cancer, thoracotomy, cervical cancer, vertigo, sent to the ED from urgent care for chest pressure described as 7 out of 10, pleuritic type pain X today.  Also reporting headache.  Patient reports she has had similar type of chest pain in the past where she sees cardiology Dr. Rubi for but reports today and felt a little worse.  She denies any shortness of breath with it, nausea vomiting diarrhea, abdominal pain, URI symptoms, headache dizziness, fever chills, sick contacts or recent travel. PE as noted above. rectal temp 100.4. ED sepsis labs ordered.  labs reviewed, d-dimer 325.  UA neg. CXR images reviewed with Dr. mulligan- KIMANI. CTA chest pending for r/o PE. Patient does not meet sepsis criteria at this time still sepsis fluids and antibiotics were not ordered    652pm: CTA chest neg.  2nd trop neg.  Shared decision making with the patient, admission was offered for cardiac work-up however she prefers to follow-up outpatient with Dr. Rubi.  She will call his office tomorrow.  Patient given strict return precautions. Patient's fever is likely from a viral syndrome. will DC 73-year-old female past medical history of left tuberculoma resection many years ago, lung cancer, thoracotomy for lung cancer 1 year ago, cervical cancer, vertigo, sent to the ED from urgent care for chest pressure described as 7 out of 10, pleuritic type pain X today.  Also reporting headache.  Patient reports she has had similar type of chest pain in the past where she sees cardiology Dr. Rubi for but reports today and felt a little worse.  She denies any shortness of breath with it, nausea vomiting diarrhea, abdominal pain, URI symptoms, headache dizziness, fever chills, sick contacts or recent travel. States since lung cancer surgery always feels tightness in and around chest, today slightly worse.     PE as noted above. rectal temp 100.4. ED sepsis labs ordered.  labs reviewed, d-dimer 325.  UA neg. CXR images reviewed with Dr. bill HARMAN. CTA chest pending for r/o PE. Patient does not meet sepsis criteria at this time still, so sepsis fluids and antibiotics were not ordered    652pm: CTA chest neg.  2nd trop neg.  Shared decision making with the patient, admission was offered for cardiac work-up however she prefers to follow-up outpatient with Dr. Rubi.  She will call his office tomorrow.  Patient given strict return precautions. Patient's fever is likely from a viral syndrome. will GARY

## 2023-07-02 NOTE — ED PROVIDER NOTE - PHYSICAL EXAMINATION
Gen: Well appearing in NAD.   ENT: oral mucosa moist, Pharynx unremarkable  Head: atraumatic  Heart: s1/s2, RRR  Lung: CTA b/l, no wheezing/rhonchi or rales. No tachypnea or hypoxia. Thoracotomy scar and lobectomy scars noted  Abd: soft, NT/ND, no rebound or guarding, NCVAT  Msk: no pedal edema or calf pain,  Neuro: AAO x3, patient moving all extremity equally, no focal neuro deficits noted  Skin: Normal for race.   Psych: Alert and oriented Gen: Well appearing in NAD.   ENT: oral mucosa moist, Pharynx unremarkable  Head: atraumatic  Heart: s1/s2, RRR, no chest wall ttp  Lung: CTA b/l, no wheezing/rhonchi or rales. No tachypnea or hypoxia. Thoracotomy scar left posterior thorax and lobectomy scars right side noted, no increased work of breathing  Abd: soft, NT/ND, no rebound or guarding, NCVAT  Msk: no pedal edema or calf pain,  Neuro: AAO x3, patient moving all extremity equally, no focal neuro deficits noted  Skin: Normal for race.   Psych: Alert and oriented

## 2023-07-02 NOTE — ED PROVIDER NOTE - OBJECTIVE STATEMENT
73-year-old female past medical history of lung cancer, thoracotomy, cervical cancer, vertigo, sent to the ED from urgent care for chest pressure described as 7 out of 10, pleuritic type pain X today.  Also reporting headache.  Patient reports she has had similar type of chest pain in the past where she sees cardiology Dr. Rubi for but reports today and felt a little worse.  She denies any shortness of breath with it, nausea vomiting diarrhea, abdominal pain, URI symptoms, headache dizziness, fever chills, sick contacts or recent travel 73-year-old female past medical history of left tuberculoma resection many years ago, lung cancer, thoracotomy for lung cancer 1 year ago, cervical cancer, vertigo, sent to the ED from urgent care for chest pressure described as 7 out of 10, pleuritic type pain X today.  Also reporting headache.  Patient reports she has had similar type of chest pain in the past where she sees cardiology Dr. Rubi for but reports today and felt a little worse.  She denies any shortness of breath with it, nausea vomiting diarrhea, abdominal pain, URI symptoms, headache dizziness, fever chills, sick contacts or recent travel. States since lung cancer surgery always feels tightness in and around chest, today slightly worse.

## 2023-07-02 NOTE — ED PROVIDER NOTE - NS ED ROS FT
Except as otherwise indicated in HPI:  CONSTITUTIONAL: Neg  HEENT: neg  CV: +chest pressure  Resp: neg  GI: Neg  : Neg  MSK: Neg  SKIN: Neg  NEURO: Neg  PSYCHIATRIC: Neg  Heme/Onc: Neg

## 2023-07-02 NOTE — ED ADULT NURSE NOTE - NSFALLUNIVINTERV_ED_ALL_ED
Bed/Stretcher in lowest position, wheels locked, appropriate side rails in place/Call bell, personal items and telephone in reach/Instruct patient to call for assistance before getting out of bed/chair/stretcher/Non-slip footwear applied when patient is off stretcher/[place] to call system/Physically safe environment - no spills, clutter or unnecessary equipment/Purposeful proactive rounding/Room/bathroom lighting operational, light cord in reach

## 2023-07-02 NOTE — ED PROVIDER NOTE - ATTENDING APP SHARED VISIT CONTRIBUTION OF CARE
Dr. Thomson: I performed a face to face bedside interview with patient regarding history of present illness, review of symptoms and past medical history. I completed an independent physical exam.  I have discussed patient's plan of care with PA.   I agree with note as stated above, having amended the EMR as needed to reflect my findings.   This includes HISTORY OF PRESENT ILLNESS, HIV, PAST MEDICAL/SURGICAL/FAMILY/SOCIAL HISTORY, ALLERGIES AND HOME MEDICATIONS, REVIEW OF SYSTEMS, PHYSICAL EXAM, and any PROGRESS NOTES during the time I functioned as the attending physician for this patient.    dr thomson: 73-year-old female past medical history of left tuberculoma resection many years ago, lung cancer, thoracotomy for lung cancer 1 year ago, cervical cancer, vertigo, sent to the ED from urgent care for chest pressure described as 7 out of 10, pleuritic type pain X today.  Also reporting headache.  Patient reports she has had similar type of chest pain in the past where she sees cardiology Dr. Rubi for but reports today and felt a little worse.  She denies any shortness of breath with it, nausea vomiting diarrhea, abdominal pain, URI symptoms, headache dizziness, fever chills, sick contacts or recent travel. States since lung cancer surgery always feels tightness in and around chest, today slightly worse.     PE as noted above. rectal temp 100.4. ED sepsis labs ordered.  labs reviewed, d-dimer 325.  UA neg. CXR images reviewed with Dr. thomson- KIMANI. CTA chest pending for r/o PE. Patient does not meet sepsis criteria at this time still, so sepsis fluids and antibiotics were not ordered    652pm: CTA chest neg.  2nd trop neg.  Shared decision making with the patient, admission was offered for cardiac work-up however she prefers to follow-up outpatient with Dr. Rubi.  She will call his office tomorrow.  Patient given strict return precautions. Patient's fever is likely from a viral syndrome. will DC 4 = completely dependent

## 2023-07-02 NOTE — ED PROVIDER NOTE - NSFOLLOWUPINSTRUCTIONS_ED_ALL_ED_FT
Follow up with your primary care physician within 2-3 days. Follow up with the cardiologist Dr. Rubi. Take the copy of your test results you were given in the emergency room for your doctor to review.     Stay hydrated    Return to the ER if your symptoms worsen or for any other medical emergencies  ************    Chest Pain    Chest pain can be caused by many different conditions which may or may not be dangerous. Causes include heartburn, lung infections, heart attack, blood clot in lungs, skin infections, strain or damage to muscle, cartilage, or bones, etc. In addition to a history and physical examination, an electrocardiogram (ECG) or other lab tests may have been performed to determine the cause of your chest pain. Follow up with your primary care provider or with a cardiologist as instructed.     SEEK IMMEDIATE MEDICAL CARE IF YOU HAVE ANY OF THE FOLLOWING SYMPTOMS: worsening chest pain, coughing up blood, unexplained back/neck/jaw pain, severe abdominal pain, dizziness or lightheadedness, fainting, shortness of breath, sweaty or clammy skin, vomiting, or racing heart beat. These symptoms may represent a serious problem that is an emergency. Do not wait to see if the symptoms will go away. Get medical help right away. Call 911 and do not drive yourself to the hospital.

## 2023-07-02 NOTE — ED PROVIDER NOTE - PATIENT PORTAL LINK FT
You can access the FollowMyHealth Patient Portal offered by [facility] by registering at the following website: [email or web address]. By joining [employer]’s FollowMyHealth portal, you will also be able to view your health information using other applications (apps) compatible with our system.

## 2023-07-02 NOTE — ED ADULT NURSE NOTE - OBJECTIVE STATEMENT
Pt BIBA from urgent care with chest pressure. Pt c/o dizziness and headache.  Chest pressure worsens on exertion.

## 2023-07-03 LAB
CULTURE RESULTS: SIGNIFICANT CHANGE UP
SPECIMEN SOURCE: SIGNIFICANT CHANGE UP

## 2023-07-06 NOTE — CHART NOTE - NSCHARTNOTEFT_GEN_A_CORE
Pt is a 72 y/o male presented to ED for chest pressure. As per [facility], pt has scheduled Cardiology appt with Sanjeev Chiu on 7/14/23.

## 2023-07-07 ENCOUNTER — NON-APPOINTMENT (OUTPATIENT)
Age: 73
End: 2023-07-07

## 2023-07-07 LAB
CULTURE RESULTS: SIGNIFICANT CHANGE UP
CULTURE RESULTS: SIGNIFICANT CHANGE UP
SPECIMEN SOURCE: SIGNIFICANT CHANGE UP
SPECIMEN SOURCE: SIGNIFICANT CHANGE UP

## 2023-07-14 ENCOUNTER — NON-APPOINTMENT (OUTPATIENT)
Age: 73
End: 2023-07-14

## 2023-07-14 ENCOUNTER — APPOINTMENT (OUTPATIENT)
Dept: CARDIOLOGY | Facility: CLINIC | Age: 73
End: 2023-07-14
Payer: MEDICARE

## 2023-07-14 VITALS
DIASTOLIC BLOOD PRESSURE: 76 MMHG | WEIGHT: 112 LBS | BODY MASS INDEX: 19.84 KG/M2 | SYSTOLIC BLOOD PRESSURE: 128 MMHG | RESPIRATION RATE: 16 BRPM | HEART RATE: 62 BPM | HEIGHT: 63 IN | OXYGEN SATURATION: 99 %

## 2023-07-14 DIAGNOSIS — R06.02 SHORTNESS OF BREATH: ICD-10-CM

## 2023-07-14 PROCEDURE — 99214 OFFICE O/P EST MOD 30 MIN: CPT | Mod: 25

## 2023-07-14 PROCEDURE — 93000 ELECTROCARDIOGRAM COMPLETE: CPT

## 2023-07-21 NOTE — ASSESSMENT
[FreeTextEntry1] : I have asked   Shala   to undergo detailed cardiac testing in order to evaluate her overall cardiovascular risk.\par An assessment of both structural and functional heart disease was recommended to the patient.\par In this regard, a stress test in cardiac MRI were advised to the patient. we are concerned about melanoma and metastatic spread to the pericardium. The stresses has returned satisfactory although the achieved heart rate was non-diagnostic. Without evidence or recent infarction overt heart failure or unstable angina and based upon a satisfactory stress test Ms. Hale may undergo planned evaluation of the lung nodule which constitutes hi risk surgery in a patient with intermediate cardiac risk at an ASA class II or III anesthesia risk\par \par Addendum 6/10/2022\par The D-dimer was borderline given Shala's age and in fact a CTA was negative for pulmonary embolism we will continue to treat empirically with nonsteroidals and anti-inflammatories for some pleuritic -like pains follow-up with Dr. Hendrix for further oncology evaluations in the second setting of a carcinoma which has been resected\par \par Addendum 3/1/2023\par Given complaints of dyspnea on exertion and a family history of coronary disease I have asked Shala to undergo a cardiac CTA in order to exclude underlying coronary disease she has never been a smoker and the finding of emphysema is unclear previous noninvasive ischemic work-up has been unrevealing and an imaging procedure would be helpful here such as a cardiac CTA.  Blood work requested including brain naturetic peptide\par \par Addendum 7/14/2023\par Multiple complaints of unclear etiology clearly Lyme to be excluded requesting blood work from Dr. Tamez underwent testing for babesiosis\par \par medical necessity\par this is a high risk encounter based upon the need to assess multiple somatic complaints of unknown clear etiology and review of multiple labs both from in hospital and from the med station\par

## 2023-07-21 NOTE — REASON FOR VISIT
[Symptom and Test Evaluation] : symptom and test evaluation [Abnormal Test Result] : an abnormal test result [FreeTextEntry1] : Shala tells me that surgery is planned on 125 she is saying [place] a CAT scan is planned one week before Deondre feels that if the lesion is stable and surgery may be postponed in the rescan planned in 3 to 4 months Shala notes a cough covid was negative she feels occasional odd sensation under the rib cage over the past two years which is unexplained when she takes a deep breath\par \par Update 6/10/2022\par Shala was recently seen in hospital with some shortness of breath the D-dimer was borderline elevated and a CTA to rule out pulmonary embolism was negative she inquires about some pleuritic -like pains which most likely represent postoperative inflammation\par \par Update 7/21/2023\par Shala tells me that she feels winded dizzy lightheaded she feels a pressure in her chest she underwent an evaluation in emergency room the D-dimer was 209 troponin 7.4/5.6 which is low a PET scan is planned. she was felt to have a stage I lung cancer which was caught early

## 2023-07-25 ENCOUNTER — OUTPATIENT (OUTPATIENT)
Dept: OUTPATIENT SERVICES | Facility: HOSPITAL | Age: 73
LOS: 1 days | Discharge: HOME | End: 2023-07-25

## 2023-07-25 DIAGNOSIS — C43.9 MALIGNANT MELANOMA OF SKIN, UNSPECIFIED: Chronic | ICD-10-CM

## 2023-07-25 DIAGNOSIS — Z98.890 OTHER SPECIFIED POSTPROCEDURAL STATES: Chronic | ICD-10-CM

## 2023-07-25 DIAGNOSIS — C43.9 MALIGNANT MELANOMA OF SKIN, UNSPECIFIED: ICD-10-CM

## 2023-07-26 ENCOUNTER — APPOINTMENT (OUTPATIENT)
Dept: PULMONOLOGY | Facility: CLINIC | Age: 73
End: 2023-07-26
Payer: MEDICARE

## 2023-07-26 VITALS
BODY MASS INDEX: 19.84 KG/M2 | SYSTOLIC BLOOD PRESSURE: 120 MMHG | HEART RATE: 60 BPM | HEIGHT: 63 IN | OXYGEN SATURATION: 98 % | DIASTOLIC BLOOD PRESSURE: 64 MMHG | TEMPERATURE: 97.6 F | WEIGHT: 112 LBS

## 2023-07-26 DIAGNOSIS — J43.9 EMPHYSEMA, UNSPECIFIED: ICD-10-CM

## 2023-07-26 PROCEDURE — 99213 OFFICE O/P EST LOW 20 MIN: CPT

## 2023-07-26 NOTE — REASON FOR VISIT
[Follow-Up] : a follow-up visit [Emphysema] : emphysema [Abnormal CXR/ Chest CT] : an abnormal CXR/ chest CT [Shortness of Breath] : shortness of breath

## 2023-07-26 NOTE — ASSESSMENT
[FreeTextEntry1] : 72y/o  female never smoker\par \par 1- shortness of breath non specific\par 2- 3/7/22-s/p Right VATS - superior segmentectomy  T1miN0 Adenocarcinoma\par \par 3-     h/o left thoracotomy   tuberculoma - INH x one year ?  compliance \par \par 4-  ctpa 7/2023    no PE  stable  nodules  6mm  RUL  ground glass,  Right lung cyst \par \par 5- emphysema on ct      /  cat rescue  8 cats\par 6- hypothyroid / palpitations / anxiety   ( detox unit in past    amphetamine in past) \par 7-  GERD  with hiatal hernia on ct   /abd pain \par 8- - vaccinations       covid    + \par \par \par Recommendations\par 1-  albuterol MDI   prn    \par 2-     f/u  oncologist  \par 3- GI  work up \par 4- monitor for  infection since -     walking in woods    --  cat   rescue    \par \par return in six months

## 2023-07-26 NOTE — HISTORY OF PRESENT ILLNESS
[Never] : never [Former] : former [TextBox_4] : -  shortness of breath\par \par 73y/o female  born in [place]   never smoker     retired  psychiatric hospital worker   h/o thoracotomy scar  HARESH  tuberculous s/p excision  1975   then  INH x 1 year -  stage IA malignant melanoma s/p excision of flank 2/15/21, lyme disease, anxiety\par \par - h/o   3/7/22-s/p Right VATS robotic-assisted superior segmentectomy with lymph node dissection on 3/7/22. Path of RLL superior segment lobectomy reveals T1miN0 Minimally Invasive Adenocarcinoma, 3.5 cm, G1, Acinar, Negative RACHEL, Negative Margins, Negative LN (0/10).\par \par - h/o 1/9/2023 CT chest-resolution of trace right pleural effusion and decreased density surrounding the right lower lobe staple line. Stable groundglass and solid lung nodules. No new or enlarging lung nodule. Emphysema. \par -plan for  heme onc     f/u  and CT chest per thoracic surgeon.\par \par -feels band like pulling sensation since surgery  3/22    worse with deep inspiration -      has   cat rescue      8 cats \par -feeling is constant  shallow    breathing at times\par - no cough \par -more sob than usual\par - no chest pain \par -never had covid \par \par 3/17/2023\par 73y/o female   non smoker  \par -f/u sob\par - d dimer neg for age  neg allergies\par - still complaining of pressure  with deep breath  same area   epigastric travels along  abdomen \par - no cough  no hemoptysis \par  \par \par 7/26/2023\par 73y/o  female never smoker  cig  ex - marijuana   ct emphysema  h/o 3/2022  Right VATS robotic-assisted superior segmentectomy for  T1miN0  Adenocarcinoma,. h/o  old  left thoracotomy       here f/u  SOB\par - had  sob trigger    air quality  then      urgent care borderline  d dimer  ctpa 7/2/2023  no PE  stable  otherwise\par - has GI   [place] for  abd pains  and  irregular BM \par \par - still outdoors   with cat  rescue and some sob - \par -albuterol mdi  use  as needed some relief\par -no chest pain \par -\par

## 2023-07-26 NOTE — REVIEW OF SYSTEMS
[SOB on Exertion] : sob on exertion [Chest Discomfort] : chest discomfort [Abdominal Pain] : abdominal pain [Thyroid Problem] : thyroid problem [Fever] : no fever [Recent Wt Gain (___ Lbs)] : ~T no recent weight gain [Chills] : no chills [Recent Wt Loss (___ Lbs)] : ~T no recent weight loss [Dry Eyes] : no dry eyes [Sore Throat] : no sore throat [Cough] : no cough [Hemoptysis] : no hemoptysis [Sputum] : no sputum [Dyspnea] : no dyspnea [Wheezing] : no wheezing [Palpitations] : no palpitations [GERD] : no gerd [Nausea] : no nausea [Vomiting] : no vomiting [Dysphagia] : no dysphagia [Bleeding] : no bleeding [Myalgias] : no myalgias [Chronic Pain] : no chronic pain [Rash] : no rash [Blood Transfusion] : no blood transfusion [Clotting Disorder/ Frequent bleeding] : no clotting disorder/ frequent bleeding [Diabetes] : no diabetes [Obesity] : no obesity

## 2023-07-26 NOTE — PHYSICAL EXAM
[No Acute Distress] : no acute distress [No Deformities] : no deformities [Normal Appearance] : normal appearance [Supple] : supple [No Neck Mass] : no neck mass [No JVD] : no jvd [Normal Rate/Rhythm] : normal rate/rhythm [Normal S1, S2] : normal s1, s2 [No Murmurs] : no murmurs [No Resp Distress] : no resp distress [No Acc Muscle Use] : no acc muscle use [Clear to Auscultation Bilaterally] : clear to auscultation bilaterally [Surgical scars] : surgical scars [Benign] : benign [Not Tender] : not tender [No Masses] : no masses [Soft] : soft [No HSM] : no hsm [No Hernias] : no hernias [Normal Bowel Sounds] : normal bowel sounds [Normal Gait] : normal gait [No Clubbing] : no clubbing [No Edema] : no edema [No Rash] : no rash [No Motor Deficits] : no motor deficits [Normal Affect] : normal affect [TextBox_2] : thin f no distress speaking full sentences no cough  [TextBox_11] : aaron mera  [TextBox_80] : well  healed scars     right small     large  left   thoracotomy  scar

## 2023-07-26 NOTE — PROCEDURE
[FreeTextEntry1] : PFT 3/9/2023\par Fair efforts\par \par FVC FEV1 are normal R 73 low normal for age FEF 25% reduced Reduced MVV\par Normal SVC reduced ERV\par \par Impression \par Likely mild obstructive airflow pattern at mid-low airways\par Reduced MVV may be due to poor efforts or neuromuscular weakness\par Recommend clinical correlation \par \par \par \par PFT 1/2022      FVC 2.75 94%  Fev1  1.96   89   R   71    FEF 25-75     1.34  72 %    TLC 5. 08  102%   dlco normal

## 2023-08-01 LAB
ALBUMIN SERPL ELPH-MCNC: 4.5 G/DL
ALP BLD-CCNC: 92 U/L
ALT SERPL-CCNC: 19 U/L
ANION GAP SERPL CALC-SCNC: 10 MMOL/L
AST SERPL-CCNC: 17 U/L
BILIRUB SERPL-MCNC: 0.3 MG/DL
BUN SERPL-MCNC: 21 MG/DL
CALCIUM SERPL-MCNC: 9.4 MG/DL
CHLORIDE SERPL-SCNC: 99 MMOL/L
CO2 SERPL-SCNC: 26 MMOL/L
CREAT SERPL-MCNC: 0.54 MG/DL
EGFR: 98 ML/MIN/1.73M2
GLUCOSE SERPL-MCNC: 111 MG/DL
POTASSIUM SERPL-SCNC: 4.5 MMOL/L
PROT SERPL-MCNC: 6.7 G/DL
SODIUM SERPL-SCNC: 136 MMOL/L

## 2023-08-03 ENCOUNTER — APPOINTMENT (OUTPATIENT)
Dept: HEMATOLOGY ONCOLOGY | Facility: CLINIC | Age: 73
End: 2023-08-03
Payer: MEDICARE

## 2023-08-03 VITALS
WEIGHT: 113.54 LBS | SYSTOLIC BLOOD PRESSURE: 122 MMHG | RESPIRATION RATE: 16 BRPM | OXYGEN SATURATION: 100 % | HEART RATE: 58 BPM | DIASTOLIC BLOOD PRESSURE: 77 MMHG | BODY MASS INDEX: 20.11 KG/M2

## 2023-08-03 DIAGNOSIS — C43.9 MALIGNANT MELANOMA OF SKIN, UNSPECIFIED: ICD-10-CM

## 2023-08-03 PROCEDURE — 99213 OFFICE O/P EST LOW 20 MIN: CPT

## 2023-08-03 NOTE — CONSULT LETTER
[Courtesy Letter:] : I had the pleasure of seeing your patient, [unfilled], in my office today. [Please see my note below.] : Please see my note below. [Consult Closing:] : Thank you very much for allowing me to participate in the care of this patient.  If you have any questions, please do not hesitate to contact me. [Sincerely,] : Sincerely, [Dear  ___] : Dear  [unfilled], [FreeTextEntry3] : Ariane Short MD

## 2023-08-03 NOTE — RESULTS/DATA
[FreeTextEntry1] : 7/2/23-CT chest-IMPRESSION: No pulmonary embolism.  Stable exam status post right lower lobe wedge resection.

## 2023-08-03 NOTE — PHYSICAL EXAM
[Fully active, able to carry on all pre-disease performance without restriction] : Status 0 - Fully active, able to carry on all pre-disease performance without restriction [Normal] : affect appropriate [de-identified] : right CW surgical scars

## 2023-08-03 NOTE — HISTORY OF PRESENT ILLNESS
[Disease: _____________________] : Disease: [unfilled] [T: ___] : T[unfilled] [AJCC Stage: ____] : AJCC Stage: [unfilled] [de-identified] : 2/2021-Patient presented to her dermatologist ([facility])-had full body scan, for screening evaluation-found suspicious lesion right side.\par  Had biopsy of right flank lesion with pathology revealing melanoma measuring 0.32 mm.  No vascular or neuro invasion or ulceration.  She subsequently had excision of the area with pathology revealing no residual melanocytic proliferation.  Margins free (portion of skin that 4.7 x 2 x 0.8 cm).  Dermatologist recommended continued surveillance.\par  \par  11/16/21-CT chest-3.7 cm superior right lower lobe part solid, cystic and groundglass lesion with 5 mm solid component on mediastinal windows, concerning for an adenocarcinoma spectrum lesion. Numerous other solid and groundglass nodules measuring up to 6 mm can be reassessed at that time.\par  \par  11/22/21-PET/CT scan-part solid, cystic and groundglass lesions in superior segment right lower lobe, unchanged and better evaluated on CT from 11/15/2021, with background FDG avidity; this is indeterminate. Malignancy with low FDG avidity like adenocarcinoma in situ is not excluded. Additional scattered subcentimeter bilateral solid and groundglass nodules, too small to characterize are nonspecific. Fibroid uterus with no associated abnormal FDG activity.\par  \par  3/7/22-s/p Right VATS robotic-assisted superior segmentectomy with lymph node dissection. 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 Stage 1A1 (T1miN0).\par  \par   [de-identified] : melanoma [de-identified] : Recently saw cardiologist and pulmonologist in f/u. Had OCHOA, which she thinks is better. Seeing Dr. Doss for acupuncture. Sees dermatologist regularly. No c/o CP, SOB at rest or increased cough.  No abnormal bruising or bleeding reported.  Saw Dr. Perkins-advised EGD/colonoscopy, but got sick from the prep and refused to reschedule. States cologuard was negative. May day reconsider at least EGD-to f/u with GI MD for this.  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

## 2023-08-03 NOTE — ASSESSMENT
[FreeTextEntry1] : Lab work, 7/26/23 pulmonary note, 7/14/23 cardiology note, 4/28/23 GI note, CTA chest report reviewed. 2/2021 Stage T1a (1A) malignant melanoma-no vascular or neural invasion or ulceration on pathology.  Status post wide excision with no residual lesion. Remains on surveillance-continue f/u with dermatology.  11/2021-RLL nodule on CT, PET/CT imaging.  3/7/22-s/p Right VATS robotic-assisted superior segmentectomy with lymph node dissection. 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 Stage 1A1 (T1miN0). Clinically stable at present. Expectant surveillance.  1/9/2023 CT chest-resolution of trace right pleural effusion and decreased density surrounding the right lower lobe staple line.  Stable groundglass and solid lung nodules.  No new or enlarging lung nodule.  Emphysema.   7/2/23-CT chest-No pulmonary embolism. Stable exam status post right lower lobe wedge resection. Next CT chest per thoracic surgeon 1/2024. --clinically stable; surveillance.  Patient was given the opportunity to ask questions.  Her many questions have been answered to her apparent satisfaction at this time.  -->RTO 6 months.

## 2023-09-07 NOTE — ED ADULT NURSE NOTE - NSFALLRSKASSESASSIST_ED_ALL_ED
SW called [facility], spoke with representative Tariq Page. After reviewing all uploaded documents from [address], he reported that it appears all necessary documents are accounted for. Carol Zhang was also able to take name/ for baby Mitchell Sierra to add to patient's application since she was born prior to patient's application being processed. Carol Zhang stated that he will send this information to local [facility]) so that baby can be approved along with patient's application. Carol Zhang did state that further information will likely need to be provided (ie. Birth certificate, SSN, etc) as well as possibly FOB's information as they are now considered a household. He reported that FOB's income should NOT affect patient's income eligibility as they are not . Per Carol Zhang, application will likely be evaluated after the deadline of , and they will either approve patient for Medicaid, Medicaid AND retroactive Medicaid (for unpaid bills), or deny her and request further documentation. If further documentation is needed, SW and patient will have 60 days to provide this to appeal denial.     SW then called patient to inform her of same. Patient and SW discussed possibility of baby being "over income" for MA, and then could be referred to [facility]. Patient relieved by this. SW to update patient next week after application has been processed. no

## 2023-11-22 ENCOUNTER — APPOINTMENT (OUTPATIENT)
Dept: CT IMAGING | Facility: HOSPITAL | Age: 73
End: 2023-11-22
Payer: MEDICARE

## 2023-11-22 ENCOUNTER — OUTPATIENT (OUTPATIENT)
Dept: OUTPATIENT SERVICES | Facility: HOSPITAL | Age: 73
LOS: 1 days | End: 2023-11-22
Payer: MEDICARE

## 2023-11-22 DIAGNOSIS — C43.9 MALIGNANT MELANOMA OF SKIN, UNSPECIFIED: Chronic | ICD-10-CM

## 2023-11-22 DIAGNOSIS — Z98.890 OTHER SPECIFIED POSTPROCEDURAL STATES: Chronic | ICD-10-CM

## 2023-11-22 DIAGNOSIS — R10.31 RIGHT LOWER QUADRANT PAIN: ICD-10-CM

## 2023-11-22 PROCEDURE — 74177 CT ABD & PELVIS W/CONTRAST: CPT

## 2023-11-22 PROCEDURE — 74177 CT ABD & PELVIS W/CONTRAST: CPT | Mod: 26

## 2023-11-24 ENCOUNTER — NON-APPOINTMENT (OUTPATIENT)
Age: 73
End: 2023-11-24

## 2023-11-30 ENCOUNTER — APPOINTMENT (OUTPATIENT)
Dept: GASTROENTEROLOGY | Facility: CLINIC | Age: 73
End: 2023-11-30
Payer: MEDICARE

## 2023-11-30 VITALS
RESPIRATION RATE: 18 BRPM | DIASTOLIC BLOOD PRESSURE: 78 MMHG | TEMPERATURE: 98.3 F | OXYGEN SATURATION: 98 % | BODY MASS INDEX: 20.09 KG/M2 | WEIGHT: 113.4 LBS | HEART RATE: 65 BPM | HEIGHT: 63 IN | SYSTOLIC BLOOD PRESSURE: 129 MMHG

## 2023-11-30 DIAGNOSIS — R93.5 ABNORMAL FINDINGS ON DIAGNOSTIC IMAGING OF OTHER ABDOMINAL REGIONS, INCLUDING RETROPERITONEUM: ICD-10-CM

## 2023-11-30 PROCEDURE — 99215 OFFICE O/P EST HI 40 MIN: CPT

## 2023-12-07 RX ORDER — FAMOTIDINE 40 MG/1
40 TABLET, FILM COATED ORAL
Qty: 180 | Refills: 3 | Status: ACTIVE | COMMUNITY
Start: 2023-12-07 | End: 1900-01-01

## 2023-12-08 NOTE — ED PROVIDER NOTE - ATTENDING CONTRIBUTION TO CARE
pt 69y f c/o right leg pain x 10 days. pain is intermittent and in different locations. sometimes upper thigh, sometimes knee, sometimes lower leg  denies numbness, tingling, fever, chills, weakness. worse walking down steps  pt concerned b/c her friend told her she could have a blood clot. no hx DVT, no recent travel, no hx dvt/pe  unremarkable exam. bakers cyst on doppler. will f/u with pcp/vascular  Dr. Chaney:  I have reviewed and discussed with the PA/ resident the case specifics, including the history, physical assessment, evaluation, conclusion, laboratory results, and medical plan. I agree with the contents, and conclusions. I have personally examined, and interviewed the patient. Principal Discharge DX:	Abdominal pain   1

## 2023-12-20 ENCOUNTER — OUTPATIENT (OUTPATIENT)
Dept: OUTPATIENT SERVICES | Facility: HOSPITAL | Age: 73
LOS: 1 days | End: 2023-12-20
Payer: MEDICARE

## 2023-12-20 ENCOUNTER — APPOINTMENT (OUTPATIENT)
Dept: GASTROENTEROLOGY | Facility: HOSPITAL | Age: 73
End: 2023-12-20

## 2023-12-20 ENCOUNTER — RESULT REVIEW (OUTPATIENT)
Age: 73
End: 2023-12-20

## 2023-12-20 DIAGNOSIS — Z98.890 OTHER SPECIFIED POSTPROCEDURAL STATES: Chronic | ICD-10-CM

## 2023-12-20 DIAGNOSIS — R93.5 ABNORMAL FINDINGS ON DIAGNOSTIC IMAGING OF OTHER ABDOMINAL REGIONS, INCLUDING RETROPERITONEUM: ICD-10-CM

## 2023-12-20 DIAGNOSIS — C43.9 MALIGNANT MELANOMA OF SKIN, UNSPECIFIED: Chronic | ICD-10-CM

## 2023-12-20 PROCEDURE — 88312 SPECIAL STAINS GROUP 1: CPT

## 2023-12-20 PROCEDURE — 88305 TISSUE EXAM BY PATHOLOGIST: CPT

## 2023-12-20 PROCEDURE — 43239 EGD BIOPSY SINGLE/MULTIPLE: CPT

## 2023-12-20 PROCEDURE — 88312 SPECIAL STAINS GROUP 1: CPT | Mod: 26

## 2023-12-20 PROCEDURE — 88305 TISSUE EXAM BY PATHOLOGIST: CPT | Mod: 26

## 2023-12-20 RX ORDER — SODIUM CHLORIDE 9 MG/ML
500 INJECTION INTRAMUSCULAR; INTRAVENOUS; SUBCUTANEOUS
Refills: 0 | Status: COMPLETED | OUTPATIENT
Start: 2023-12-20 | End: 2023-12-20

## 2023-12-20 RX ADMIN — SODIUM CHLORIDE 75 MILLILITER(S): 9 INJECTION INTRAMUSCULAR; INTRAVENOUS; SUBCUTANEOUS at 13:12

## 2023-12-22 LAB
SURGICAL PATHOLOGY STUDY: SIGNIFICANT CHANGE UP
SURGICAL PATHOLOGY STUDY: SIGNIFICANT CHANGE UP

## 2024-01-02 ENCOUNTER — OUTPATIENT (OUTPATIENT)
Dept: OUTPATIENT SERVICES | Facility: HOSPITAL | Age: 74
LOS: 1 days | Discharge: HOME | End: 2024-01-02

## 2024-01-02 DIAGNOSIS — Z98.890 OTHER SPECIFIED POSTPROCEDURAL STATES: Chronic | ICD-10-CM

## 2024-01-02 DIAGNOSIS — C43.9 MALIGNANT MELANOMA OF SKIN, UNSPECIFIED: Chronic | ICD-10-CM

## 2024-01-02 DIAGNOSIS — C43.9 MALIGNANT MELANOMA OF SKIN, UNSPECIFIED: ICD-10-CM

## 2024-01-03 ENCOUNTER — APPOINTMENT (OUTPATIENT)
Dept: HEMATOLOGY ONCOLOGY | Facility: CLINIC | Age: 74
End: 2024-01-03
Payer: MEDICARE

## 2024-01-03 PROCEDURE — 99443: CPT

## 2024-01-03 NOTE — ASSESSMENT
[FreeTextEntry1] : CBC, CMP, EGD report reviewed. 2/2021 Stage T1a (1A) malignant melanoma-no vascular or neural invasion or ulceration on pathology. Status post wide excision with no residual lesion. Remains on surveillance-continue f/u with dermatology.  11/2021-RLL nodule on CT; PET/CT-1. Part solid, cystic and groundglass lesions in superior segment right lower lobe, unchanged and better evaluated on CT from 11/15/2021, with background FDG avidity; this is indeterminate. Malignancy with low FDG avidity like adenocarcinoma in situ is not excluded. Additional scattered subcentimeter bilateral solid and groundglass nodules, too small to characterize are nonspecific. 2. Fibroid uterus with no associated abnormal FDG activity.. 3/7/22-s/p Right VATS robotic-assisted superior segmentectomy with lymph node dissection. 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 Stage 1A1 (T1miN0).   1/9/2023 CT chest-resolution of trace right pleural effusion and decreased density surrounding the right lower lobe staple line. Stable groundglass and solid lung nodules. No new or enlarging lung nodule. Emphysema. 7/2/23-CT chest-No pulmonary embolism. Stable exam status post right lower lobe wedge resection. Next CT chest per thoracic surgeon 1/2024-pending. --clinically stable at present; surveillance.  Patient was given the opportunity to ask questions. Her many questions have been answered to her apparent satisfaction at this time.  -->Patient wishes to RTO in Feb. as already scheduled.

## 2024-01-03 NOTE — REASON FOR VISIT
[Follow-Up Visit] : a follow-up [Home] : at home, [unfilled] , at the time of the visit. [Medical Office: ([facility])___] : at the medical office located in  [Verbal consent obtained from patient] : the patient, [unfilled] [FreeTextEntry2] : lung cancer, melanoma

## 2024-01-03 NOTE — HISTORY OF PRESENT ILLNESS
[Disease: _____________________] : Disease: [unfilled] [T: ___] : T[unfilled] [AJCC Stage: ____] : AJCC Stage: [unfilled] [de-identified] : 2/2021-Patient presented to her dermatologist ([facility])-had full body scan, for screening evaluation-found suspicious lesion right side.\par  Had biopsy of right flank lesion with pathology revealing melanoma measuring 0.32 mm.  No vascular or neuro invasion or ulceration.  She subsequently had excision of the area with pathology revealing no residual melanocytic proliferation.  Margins free (portion of skin that 4.7 x 2 x 0.8 cm).  Dermatologist recommended continued surveillance.\par  \par  11/16/21-CT chest-3.7 cm superior right lower lobe part solid, cystic and groundglass lesion with 5 mm solid component on mediastinal windows, concerning for an adenocarcinoma spectrum lesion. Numerous other solid and groundglass nodules measuring up to 6 mm can be reassessed at that time.\par  \par  11/22/21-PET/CT scan-part solid, cystic and groundglass lesions in superior segment right lower lobe, unchanged and better evaluated on CT from 11/15/2021, with background FDG avidity; this is indeterminate. Malignancy with low FDG avidity like adenocarcinoma in situ is not excluded. Additional scattered subcentimeter bilateral solid and groundglass nodules, too small to characterize are nonspecific. Fibroid uterus with no associated abnormal FDG activity.\par  \par  3/7/22-s/p Right VATS robotic-assisted superior segmentectomy with lymph node dissection. 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 Stage 1A1 (T1miN0).\par  \par   [de-identified] : melanoma [de-identified] : S/P GI evaluation for stomach "pains." S/P recent EGD-had ulcer. States pain now resolved. Taking famotidine BID. Has reduced caffeine intake. Refuses colonoscopy at this time (got sick from prep 2022.and didn't reschedule). Sees dermatologist regularly. No c/o CP, SOB at rest or increased cough.  No abnormal bruising or bleeding reported.  Is anxious.  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

## 2024-01-05 ENCOUNTER — OUTPATIENT (OUTPATIENT)
Dept: OUTPATIENT SERVICES | Facility: HOSPITAL | Age: 74
LOS: 1 days | End: 2024-01-05
Payer: MEDICARE

## 2024-01-05 ENCOUNTER — APPOINTMENT (OUTPATIENT)
Dept: CT IMAGING | Facility: HOSPITAL | Age: 74
End: 2024-01-05
Payer: MEDICARE

## 2024-01-05 DIAGNOSIS — Z98.890 OTHER SPECIFIED POSTPROCEDURAL STATES: Chronic | ICD-10-CM

## 2024-01-05 DIAGNOSIS — C43.9 MALIGNANT MELANOMA OF SKIN, UNSPECIFIED: Chronic | ICD-10-CM

## 2024-01-05 DIAGNOSIS — C34.90 MALIGNANT NEOPLASM OF UNSPECIFIED PART OF UNSPECIFIED BRONCHUS OR LUNG: ICD-10-CM

## 2024-01-05 PROCEDURE — 71250 CT THORAX DX C-: CPT

## 2024-01-05 PROCEDURE — 71250 CT THORAX DX C-: CPT | Mod: 26

## 2024-01-09 ENCOUNTER — APPOINTMENT (OUTPATIENT)
Dept: OBGYN | Facility: CLINIC | Age: 74
End: 2024-01-09
Payer: MEDICARE

## 2024-01-09 ENCOUNTER — NON-APPOINTMENT (OUTPATIENT)
Age: 74
End: 2024-01-09

## 2024-01-09 VITALS
TEMPERATURE: 97.5 F | SYSTOLIC BLOOD PRESSURE: 122 MMHG | HEART RATE: 56 BPM | OXYGEN SATURATION: 96 % | DIASTOLIC BLOOD PRESSURE: 80 MMHG | WEIGHT: 113 LBS | HEIGHT: 63 IN | BODY MASS INDEX: 20.02 KG/M2

## 2024-01-09 DIAGNOSIS — Z01.419 ENCOUNTER FOR GYNECOLOGICAL EXAMINATION (GENERAL) (ROUTINE) W/OUT ABNORMAL FINDINGS: ICD-10-CM

## 2024-01-09 DIAGNOSIS — R92.30 DENSE BREASTS, UNSPECIFIED: ICD-10-CM

## 2024-01-09 DIAGNOSIS — Z12.39 ENCOUNTER FOR OTHER SCREENING FOR MALIGNANT NEOPLASM OF BREAST: ICD-10-CM

## 2024-01-09 DIAGNOSIS — N95.2 POSTMENOPAUSAL ATROPHIC VAGINITIS: ICD-10-CM

## 2024-01-09 PROCEDURE — 99397 PER PM REEVAL EST PAT 65+ YR: CPT

## 2024-01-09 NOTE — PLAN
[FreeTextEntry1] : I spent the time noted on the day of this patient encounter preparing for, providing and documenting the above service. I have  counseled and educated the patient on the differential, workup, disease course, and treatment/management plan. Education was provided to the patient during this encounter. All questions and concerns were answered and addressed in detail.  Cher Mcqueen MD

## 2024-01-09 NOTE — HISTORY OF PRESENT ILLNESS
[FreeTextEntry1] : Pt is a 73 year old who presents today for well woman exam.  Issues with taking deep breaths since lung surgery  Chronic constipation, fu with GI planned

## 2024-01-10 LAB — HPV HIGH+LOW RISK DNA PNL CVX: NOT DETECTED

## 2024-01-11 ENCOUNTER — APPOINTMENT (OUTPATIENT)
Dept: THORACIC SURGERY | Facility: CLINIC | Age: 74
End: 2024-01-11
Payer: MEDICARE

## 2024-01-11 ENCOUNTER — NON-APPOINTMENT (OUTPATIENT)
Age: 74
End: 2024-01-11

## 2024-01-11 LAB
C TRACH RRNA SPEC QL NAA+PROBE: NOT DETECTED
CANDIDA VAG CYTO: NOT DETECTED
CYTOLOGY CVX/VAG DOC THIN PREP: ABNORMAL
G VAGINALIS+PREV SP MTYP VAG QL MICRO: NOT DETECTED
N GONORRHOEA RRNA SPEC QL NAA+PROBE: NOT DETECTED
SOURCE AMPLIFICATION: NORMAL
T VAGINALIS VAG QL WET PREP: NOT DETECTED

## 2024-01-11 PROCEDURE — 99442: CPT

## 2024-01-11 NOTE — ASSESSMENT
[FreeTextEntry1] : Ms. MANASA MCRAE, 73 year old female, never a smoker (+ second hand smoke), w/ hx of diagnosed Stage IA malignant melanoma. s/p wide excision of flank on 2/15/21  3/7/22-s/p Right VATS robotic-assisted superior segmentectomy with lymph node dissection on 3/7/22. 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  She presents today for follow up with imaging via tele visit.  Films  reviewed - no significant lesions or changes noted. Small " nodules" impacted mucoid material not worrisome for cancer.  Plan follow up CAT one year - to see pulmonary MD re repeat PFT.   Recommendations reviewed with patient during this office visit, and all questions answered; Patient instructed on the importance of follow up and verbalizes understanding.    I, VINAY Chaudhry, personally performed the evaluation and management (E/M) services for this established patient. That E/M includes conducting the examination, assessing all new/exacerbated conditions, and establishing a new plan of care. Today, my ACP, Vaughn Paez NP, was here to observe my evaluation and management services for this new problem/exacerbated condition to be followed going forward.

## 2024-01-11 NOTE — CONSULT LETTER
[FreeTextEntry2] : Dr. Hendrix ([facility])/ref \par   [FreeTextEntry3] : Ross Escobar MD, FACS \par  Vice Chairperson, Thoracic Surgery, [facility]  Department of Cardiovascular & Thoracic Surgery \par  , [facility] [employer] [facility]

## 2024-01-11 NOTE — HISTORY OF PRESENT ILLNESS
[FreeTextEntry1] : Ms. MANASA MCRAE, 73 year old female, never a smoker (+ second hand smoke), w/ hx of Stage IA malignant melanoma. s/p wide excision of flank on 2/15/21, p/w neck pain s/p CT Neck which revealed RLL groundglass opacity. 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 Further CT Chest reveals other solid and groundglass pulmonary nodules.   3/7/22-s/p Right VATS robotic-assisted superior segmentectomy with lymph node dissection on 3/7/22. 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  Post operatively, followed up with Dr. Sue Hendrix - No adjuvant therapy at this time.   CT Chest on 1/9/23: - Decreased density surrounding staple line in RLL - Tracheal bronchus again supplies RUL - Stable 7 x 4 mm RUL pure groundglass nodule (image 44 series 3) - Several stable solid sub-5 mm lung nodules for instance RML (image 74) and LLL (images 105 and 121) - Trace right pleural effusion has resolved with similar mild right pleural thickening posterior right base. - Stable homogeneous fluid attenuation lesion abutting the pericardium previously demonstrating no enhancing components on MRI suggestive of pericardial cyst or diverticulum.  CT Chest on 01/05/2024: - New left lower lobe small cluster of sub-3 mm nodules (3-132). - Unchanged right lower sublobar resection scarring.  - Unchanged multiple other 1-2 mm solid nodules in both lungs, and faint right upper lobe 6 mm groundglass nodule (3-46).  - Unchanged anterior mediastinal cyst. - Unchanged deformity of the left sixth rib and sclerotic foci in the spine which may represent bone islands. - Small calcifications in the spleen and right kidney.   Patient is here today for 1 year follow up via tele visit. Overall, she reports to be feeling well. Denies any chest pain, shortness of breath, cough, or hemoptysis.

## 2024-01-17 ENCOUNTER — EMERGENCY (EMERGENCY)
Facility: HOSPITAL | Age: 74
LOS: 1 days | Discharge: HOME | End: 2024-01-17
Attending: EMERGENCY MEDICINE | Admitting: EMERGENCY MEDICINE
Payer: MEDICARE

## 2024-01-17 VITALS
OXYGEN SATURATION: 100 % | WEIGHT: 115.08 LBS | RESPIRATION RATE: 17 BRPM | HEART RATE: 64 BPM | TEMPERATURE: 98 F | HEIGHT: 64 IN | DIASTOLIC BLOOD PRESSURE: 65 MMHG | SYSTOLIC BLOOD PRESSURE: 130 MMHG

## 2024-01-17 VITALS
TEMPERATURE: 98 F | DIASTOLIC BLOOD PRESSURE: 68 MMHG | HEART RATE: 69 BPM | OXYGEN SATURATION: 100 % | RESPIRATION RATE: 17 BRPM | SYSTOLIC BLOOD PRESSURE: 119 MMHG

## 2024-01-17 DIAGNOSIS — Z98.890 OTHER SPECIFIED POSTPROCEDURAL STATES: Chronic | ICD-10-CM

## 2024-01-17 DIAGNOSIS — C43.9 MALIGNANT MELANOMA OF SKIN, UNSPECIFIED: Chronic | ICD-10-CM

## 2024-01-17 PROCEDURE — 99284 EMERGENCY DEPT VISIT MOD MDM: CPT

## 2024-01-17 PROCEDURE — 99284 EMERGENCY DEPT VISIT MOD MDM: CPT | Mod: 25

## 2024-01-17 PROCEDURE — 96374 THER/PROPH/DIAG INJ IV PUSH: CPT

## 2024-01-17 RX ADMIN — Medication 100 MILLIGRAM(S): at 02:18

## 2024-01-17 NOTE — ED PROVIDER NOTE - NSFOLLOWUPINSTRUCTIONS_ED_ALL_ED_FT
Make sure you take all antibiotics as prescribed.  If you notice any spreading of the rash or redness pain or swelling return to the emergency department immediately    Animal Bite    Animal bites can range from mild to serious. An animal bite can result in a scratch on the skin, a deep open cut, a puncture of the skin, a crush injury, or tearing away of the skin or a body part. Treatment includes wound care, updating your tetanus shot, and in some cases, administering a rabies vaccine. If you were prescribed an antibiotic, take it as told by your health care provider. Do not stop using the antibiotic even if your condition improves.      SEEK IMMEDIATE MEDICAL CARE IF YOU HAVE ANY OF THE FOLLOWING SYMPTOMS: red streaking away from the wound, fluid/blood/pus coming from the wound, fever or chills, trouble moving the injured area, numbness or tingling extending beyond the wound.

## 2024-01-17 NOTE — ED PROVIDER NOTE - PATIENT PORTAL LINK FT
You can access the FollowMyHealth Patient Portal offered by [facility] by registering at the following website: [email or web address]. By joining [facility]’s FollowMyHealth portal, you will also be able to view your health information using other applications (apps) compatible with our system.

## 2024-01-17 NOTE — ED PROVIDER NOTE - CLINICAL SUMMARY MEDICAL DECISION MAKING FREE TEXT BOX
73-year-old female no significant past medical history with cat bite.  There is swelling and signs of a cellulitis of the right hand.  This is not surprising in the setting of a cat bite.  The patient does have an allergy to penicillin so we will start clindamycin at this time.  Shared decision making with regards to bringing to observation versus 1 dose IV and then trying outpatient management.  She is going to attempt outpatient management at this time.  There is no signs of a flex oh or extensor tenosynovitis at this time.  Patient given strict return precautions.

## 2024-01-17 NOTE — ED ADULT TRIAGE NOTE - CHIEF COMPLAINT QUOTE
PAtient presents to ED with complaint of right hand pain and swelling x several hours. PAtient states she was bitten by a cat at 1100 on 1/16/2024. Alert and oriented x 4. No nausea, vomiting, dizziness or SOB.

## 2024-01-17 NOTE — ED PROVIDER NOTE - OBJECTIVE STATEMENT
73-year-old female no significant past medical history presents emerged from today after a cat bite.  States she was outside feeding a feral cat when a pitbull scared her and the cat leading her to bite her right hands.  She said she was doing okay until approximately 2 hours ago when her hand started to become more painful and swollen it was at that time she came to the emergency department.  States that she had a infection a few years back that her entire arm blew up much more rapidly than this did and required admission for IV antibiotics.  States that the pain is relatively mild however is concerned and came to the emergency department.  She is right-handed dominant

## 2024-01-17 NOTE — ED PROVIDER NOTE - PHYSICAL EXAMINATION
Vitals: I have reviewed the patients vital signs  General: nontoxic appearing  HEENT: Atraumatic, normocephalic, airway patent  Eyes: EOMI, tracking appropriately  Neck: no tracheal deviation  Chest/Lungs: no trauma, symmetric chest rise, speaking in complete sentences,  no resp distress  Heart: skin and extremities well perfused, regular rate and rhythm  Neuro: A+Ox3, appears non focal  MSK: no deformities, the dorsal surface of the right hand is swollen around the third base of the metacarpal.  There is erythema and warmth to same location.  There is a full range of motion with good sensation and pulses there is no warmth proximal to the mid hands and wrists  Skin: no cyanosis, no jaundice   Psych:  Normal mood and affect

## 2024-01-18 ENCOUNTER — EMERGENCY (EMERGENCY)
Facility: HOSPITAL | Age: 74
LOS: 1 days | Discharge: HOME | End: 2024-01-18
Attending: STUDENT IN AN ORGANIZED HEALTH CARE EDUCATION/TRAINING PROGRAM | Admitting: STUDENT IN AN ORGANIZED HEALTH CARE EDUCATION/TRAINING PROGRAM
Payer: MEDICARE

## 2024-01-18 VITALS
TEMPERATURE: 98 F | WEIGHT: 113.1 LBS | DIASTOLIC BLOOD PRESSURE: 80 MMHG | HEIGHT: 64 IN | RESPIRATION RATE: 18 BRPM | OXYGEN SATURATION: 98 % | SYSTOLIC BLOOD PRESSURE: 138 MMHG | HEART RATE: 71 BPM

## 2024-01-18 DIAGNOSIS — M79.641 PAIN IN RIGHT HAND: ICD-10-CM

## 2024-01-18 DIAGNOSIS — E03.9 HYPOTHYROIDISM, UNSPECIFIED: ICD-10-CM

## 2024-01-18 DIAGNOSIS — Z98.890 OTHER SPECIFIED POSTPROCEDURAL STATES: Chronic | ICD-10-CM

## 2024-01-18 DIAGNOSIS — C43.9 MALIGNANT MELANOMA OF SKIN, UNSPECIFIED: Chronic | ICD-10-CM

## 2024-01-18 DIAGNOSIS — Z88.0 ALLERGY STATUS TO PENICILLIN: ICD-10-CM

## 2024-01-18 DIAGNOSIS — L03.113 CELLULITIS OF RIGHT UPPER LIMB: ICD-10-CM

## 2024-01-18 DIAGNOSIS — M79.89 OTHER SPECIFIED SOFT TISSUE DISORDERS: ICD-10-CM

## 2024-01-18 DIAGNOSIS — J43.9 EMPHYSEMA, UNSPECIFIED: ICD-10-CM

## 2024-01-18 LAB
ALBUMIN SERPL ELPH-MCNC: 3.7 G/DL — SIGNIFICANT CHANGE UP (ref 3.3–5)
ALP SERPL-CCNC: 94 U/L — SIGNIFICANT CHANGE UP (ref 40–120)
ALT FLD-CCNC: 31 U/L — SIGNIFICANT CHANGE UP (ref 10–45)
ANION GAP SERPL CALC-SCNC: 11 MMOL/L — SIGNIFICANT CHANGE UP (ref 5–17)
AST SERPL-CCNC: 23 U/L — SIGNIFICANT CHANGE UP (ref 10–40)
BASOPHILS # BLD AUTO: 0.03 K/UL — SIGNIFICANT CHANGE UP (ref 0–0.2)
BASOPHILS NFR BLD AUTO: 0.5 % — SIGNIFICANT CHANGE UP (ref 0–2)
BILIRUB SERPL-MCNC: 0.4 MG/DL — SIGNIFICANT CHANGE UP (ref 0.2–1.2)
BUN SERPL-MCNC: 16 MG/DL — SIGNIFICANT CHANGE UP (ref 7–23)
CALCIUM SERPL-MCNC: 9.3 MG/DL — SIGNIFICANT CHANGE UP (ref 8.4–10.5)
CHLORIDE SERPL-SCNC: 98 MMOL/L — SIGNIFICANT CHANGE UP (ref 96–108)
CO2 SERPL-SCNC: 26 MMOL/L — SIGNIFICANT CHANGE UP (ref 22–31)
CREAT SERPL-MCNC: 0.52 MG/DL — SIGNIFICANT CHANGE UP (ref 0.5–1.3)
EGFR: 99 ML/MIN/1.73M2 — SIGNIFICANT CHANGE UP
EOSINOPHIL # BLD AUTO: 0.06 K/UL — SIGNIFICANT CHANGE UP (ref 0–0.5)
EOSINOPHIL NFR BLD AUTO: 1 % — SIGNIFICANT CHANGE UP (ref 0–6)
GLUCOSE SERPL-MCNC: 108 MG/DL — HIGH (ref 70–99)
HCT VFR BLD CALC: 37.4 % — SIGNIFICANT CHANGE UP (ref 34.5–45)
HGB BLD-MCNC: 12.8 G/DL — SIGNIFICANT CHANGE UP (ref 11.5–15.5)
IMM GRANULOCYTES NFR BLD AUTO: 0.3 % — SIGNIFICANT CHANGE UP (ref 0–0.9)
LYMPHOCYTES # BLD AUTO: 1.07 K/UL — SIGNIFICANT CHANGE UP (ref 1–3.3)
LYMPHOCYTES # BLD AUTO: 17.7 % — SIGNIFICANT CHANGE UP (ref 13–44)
MCHC RBC-ENTMCNC: 30.8 PG — SIGNIFICANT CHANGE UP (ref 27–34)
MCHC RBC-ENTMCNC: 34.2 GM/DL — SIGNIFICANT CHANGE UP (ref 32–36)
MCV RBC AUTO: 90.1 FL — SIGNIFICANT CHANGE UP (ref 80–100)
MONOCYTES # BLD AUTO: 0.56 K/UL — SIGNIFICANT CHANGE UP (ref 0–0.9)
MONOCYTES NFR BLD AUTO: 9.3 % — SIGNIFICANT CHANGE UP (ref 2–14)
NEUTROPHILS # BLD AUTO: 4.3 K/UL — SIGNIFICANT CHANGE UP (ref 1.8–7.4)
NEUTROPHILS NFR BLD AUTO: 71.2 % — SIGNIFICANT CHANGE UP (ref 43–77)
NRBC # BLD: 0 /100 WBCS — SIGNIFICANT CHANGE UP (ref 0–0)
PLATELET # BLD AUTO: 242 K/UL — SIGNIFICANT CHANGE UP (ref 150–400)
POTASSIUM SERPL-MCNC: 4 MMOL/L — SIGNIFICANT CHANGE UP (ref 3.5–5.3)
POTASSIUM SERPL-SCNC: 4 MMOL/L — SIGNIFICANT CHANGE UP (ref 3.5–5.3)
PROT SERPL-MCNC: 7.5 G/DL — SIGNIFICANT CHANGE UP (ref 6–8.3)
RBC # BLD: 4.15 M/UL — SIGNIFICANT CHANGE UP (ref 3.8–5.2)
RBC # FLD: 12.2 % — SIGNIFICANT CHANGE UP (ref 10.3–14.5)
SODIUM SERPL-SCNC: 135 MMOL/L — SIGNIFICANT CHANGE UP (ref 135–145)
WBC # BLD: 6.04 K/UL — SIGNIFICANT CHANGE UP (ref 3.8–10.5)
WBC # FLD AUTO: 6.04 K/UL — SIGNIFICANT CHANGE UP (ref 3.8–10.5)

## 2024-01-18 PROCEDURE — 99223 1ST HOSP IP/OBS HIGH 75: CPT

## 2024-01-18 PROCEDURE — 99285 EMERGENCY DEPT VISIT HI MDM: CPT

## 2024-01-18 PROCEDURE — 73130 X-RAY EXAM OF HAND: CPT | Mod: 26,RT

## 2024-01-18 RX ORDER — LEVOTHYROXINE SODIUM 125 MCG
88 TABLET ORAL DAILY
Refills: 0 | Status: DISCONTINUED | OUTPATIENT
Start: 2024-01-18 | End: 2024-01-22

## 2024-01-18 RX ORDER — ACETAMINOPHEN 500 MG
650 TABLET ORAL EVERY 6 HOURS
Refills: 0 | Status: DISCONTINUED | OUTPATIENT
Start: 2024-01-18 | End: 2024-01-22

## 2024-01-18 RX ORDER — LEVOTHYROXINE SODIUM 125 MCG
1 TABLET ORAL
Refills: 0 | DISCHARGE

## 2024-01-18 RX ORDER — ARMODAFINIL 200 MG/1
1 TABLET ORAL
Qty: 0 | Refills: 0 | DISCHARGE

## 2024-01-18 RX ORDER — FAMOTIDINE 10 MG/ML
20 INJECTION INTRAVENOUS
Refills: 0 | Status: DISCONTINUED | OUTPATIENT
Start: 2024-01-18 | End: 2024-01-22

## 2024-01-18 RX ORDER — METRONIDAZOLE 500 MG
TABLET ORAL
Refills: 0 | Status: DISCONTINUED | OUTPATIENT
Start: 2024-01-18 | End: 2024-01-19

## 2024-01-18 RX ORDER — ASCORBIC ACID 60 MG
0 TABLET,CHEWABLE ORAL
Qty: 0 | Refills: 0 | DISCHARGE

## 2024-01-18 RX ORDER — CIPROFLOXACIN LACTATE 400MG/40ML
400 VIAL (ML) INTRAVENOUS ONCE
Refills: 0 | Status: DISCONTINUED | OUTPATIENT
Start: 2024-01-18 | End: 2024-01-18

## 2024-01-18 RX ORDER — ERGOCALCIFEROL 1.25 MG/1
0 CAPSULE ORAL
Qty: 0 | Refills: 0 | DISCHARGE

## 2024-01-18 RX ORDER — ZINC SULFATE TAB 220 MG (50 MG ZINC EQUIVALENT) 220 (50 ZN) MG
0 TAB ORAL
Qty: 0 | Refills: 0 | DISCHARGE

## 2024-01-18 RX ORDER — ACETAMINOPHEN 500 MG
2 TABLET ORAL
Qty: 0 | Refills: 0 | DISCHARGE

## 2024-01-18 RX ORDER — METRONIDAZOLE 500 MG
500 TABLET ORAL ONCE
Refills: 0 | Status: COMPLETED | OUTPATIENT
Start: 2024-01-18 | End: 2024-01-18

## 2024-01-18 RX ORDER — SODIUM CHLORIDE 9 MG/ML
1000 INJECTION INTRAMUSCULAR; INTRAVENOUS; SUBCUTANEOUS ONCE
Refills: 0 | Status: COMPLETED | OUTPATIENT
Start: 2024-01-18 | End: 2024-01-18

## 2024-01-18 RX ORDER — METRONIDAZOLE 500 MG
500 TABLET ORAL EVERY 8 HOURS
Refills: 0 | Status: DISCONTINUED | OUTPATIENT
Start: 2024-01-18 | End: 2024-01-19

## 2024-01-18 RX ORDER — IBUPROFEN 200 MG
2 TABLET ORAL
Qty: 0 | Refills: 0 | DISCHARGE

## 2024-01-18 RX ORDER — FAMOTIDINE 10 MG/ML
1 INJECTION INTRAVENOUS
Refills: 0 | DISCHARGE

## 2024-01-18 RX ADMIN — SODIUM CHLORIDE 1000 MILLILITER(S): 9 INJECTION INTRAMUSCULAR; INTRAVENOUS; SUBCUTANEOUS at 18:56

## 2024-01-18 RX ADMIN — Medication 100 MILLIGRAM(S): at 18:38

## 2024-01-18 RX ADMIN — Medication 100 MILLIGRAM(S): at 18:57

## 2024-01-18 RX ADMIN — SODIUM CHLORIDE 2000 MILLILITER(S): 9 INJECTION INTRAMUSCULAR; INTRAVENOUS; SUBCUTANEOUS at 17:15

## 2024-01-18 RX ADMIN — Medication 100 MILLIGRAM(S): at 16:56

## 2024-01-18 RX ADMIN — FAMOTIDINE 20 MILLIGRAM(S): 10 INJECTION INTRAVENOUS at 19:12

## 2024-01-18 NOTE — H&P ADULT - SKIN COMMENTS
dorsum right hand erythema w/ swelling. 2 puncture wounds noted on knuckles. No drainage or fluctuance appreciated

## 2024-01-18 NOTE — ED ADULT NURSE NOTE - OBJECTIVE STATEMENT
s/p cat bite-with redness of right hand and as per patient it was swollen today, afebrile, no red streaking up right arm

## 2024-01-18 NOTE — ED ADULT NURSE REASSESSMENT NOTE - NS ED NURSE REASSESS COMMENT FT1
Received pt. from previous RN, Awake, Alert. oriented x3, breathing with ease on RA, no acute distress noted, VSS, Flagyl infusing well via pump, family at the bed side.

## 2024-01-18 NOTE — ED ADULT NURSE NOTE - CHIEF COMPLAINT QUOTE
Patient presents to ED for evaluation of right hand s/p cat bite. Patient was seen her at [facility] 2 days ago and treated with IV abx and then sent home on PO abx.

## 2024-01-18 NOTE — ED PROVIDER NOTE - OBJECTIVE STATEMENT
73-year-old female with no reported significant past medical history presenting to the ED with complaints of right hand pain and swelling redness traveling up to the wrist area after a cat bite along right proximal third digit, preserved range of motion and flexion of fingers, denies any pain with extension or flexion.  Patient reports no tactile fever chills no reported nausea vomiting.  Patient has been taking clindamycin, took 6 doses so far, was recently seen in the ED and received IV clindamycin.  No other reported complaints.

## 2024-01-18 NOTE — H&P ADULT - HISTORY OF PRESENT ILLNESS
72yo female with hx of Emphysema, Hypothyroidism, recent GI ulcer s/p EGD w/ bx revealing metaplasia, presented to the ED w/ complaint of right hand swelling/ redness and pain s/p cat bite 2 days ago. Pt states she was bit by a stray cat a few days ago. She was started on Clindamycin with minimal to no improvement. Pain and redness became worse and she was worried as she had an episode of cellulitis in the past which was not improving. She decided to come to the ED. Denies cp, palpitations, sob, abd pain, N/V, fever, chills

## 2024-01-18 NOTE — ED PROVIDER NOTE - PHYSICAL EXAMINATION
VITAL SIGNS: I have reviewed nursing notes and confirm.  CONSTITUTIONAL: well-appearing, non-toxic, NAD  SKIN: Warm dry, normal skin turgor R dorsal hand swelling, erythema extending to wrist   HEAD: NCAT  CARD: RRR, no murmurs, rubs or gallops  RESP: clear to ausculation b/l.  No rales, rhonchi, or wheezing.  EXT: Full ROM, no bony tenderness, no fusiform swelling, negative Kanavel's sign

## 2024-01-18 NOTE — ED ADULT TRIAGE NOTE - CHIEF COMPLAINT QUOTE
Patient presents to ED for evaluation of right hand s/p cat bite. Patient was seen her at [facility] 2 days ago and treated with IV abx and then sent home on PO abx.

## 2024-01-18 NOTE — H&P ADULT - ASSESSMENT
74yo female with hx of Emphysema, lung cancer, Hypothyroidism, recent GI ulcer s/p EGD w/ bx revealing metaplasia, presented to the ED w/ complaint of right hand swelling/ redness and pain s/p cat bite 2 days ago, noted to have cellulitis    #Cellulitis  #Cat bite  -Change from Clindamycin to Doxy + Flagyl  -If improved, okay to transition to PO to complete course as outpatient    #Hypothyroidism  -Synthroid    #GI Ulcer  -Pepcid    #VTE ppx  Low risk    Boyfriend at bedside

## 2024-01-19 ENCOUNTER — TRANSCRIPTION ENCOUNTER (OUTPATIENT)
Age: 74
End: 2024-01-19

## 2024-01-19 VITALS
RESPIRATION RATE: 16 BRPM | HEART RATE: 60 BPM | SYSTOLIC BLOOD PRESSURE: 120 MMHG | DIASTOLIC BLOOD PRESSURE: 71 MMHG | TEMPERATURE: 98 F | OXYGEN SATURATION: 96 %

## 2024-01-19 LAB
ANION GAP SERPL CALC-SCNC: 8 MMOL/L — SIGNIFICANT CHANGE UP (ref 5–17)
BUN SERPL-MCNC: 15 MG/DL — SIGNIFICANT CHANGE UP (ref 7–23)
CALCIUM SERPL-MCNC: 9.1 MG/DL — SIGNIFICANT CHANGE UP (ref 8.4–10.5)
CHLORIDE SERPL-SCNC: 103 MMOL/L — SIGNIFICANT CHANGE UP (ref 96–108)
CO2 SERPL-SCNC: 26 MMOL/L — SIGNIFICANT CHANGE UP (ref 22–31)
CREAT SERPL-MCNC: 0.38 MG/DL — LOW (ref 0.5–1.3)
EGFR: 106 ML/MIN/1.73M2 — SIGNIFICANT CHANGE UP
GLUCOSE SERPL-MCNC: 100 MG/DL — HIGH (ref 70–99)
HCT VFR BLD CALC: 36.5 % — SIGNIFICANT CHANGE UP (ref 34.5–45)
HCV AB S/CO SERPL IA: 0.11 S/CO — SIGNIFICANT CHANGE UP (ref 0–0.99)
HCV AB SERPL-IMP: SIGNIFICANT CHANGE UP
HGB BLD-MCNC: 12.3 G/DL — SIGNIFICANT CHANGE UP (ref 11.5–15.5)
MCHC RBC-ENTMCNC: 30.3 PG — SIGNIFICANT CHANGE UP (ref 27–34)
MCHC RBC-ENTMCNC: 33.7 GM/DL — SIGNIFICANT CHANGE UP (ref 32–36)
MCV RBC AUTO: 89.9 FL — SIGNIFICANT CHANGE UP (ref 80–100)
NRBC # BLD: 0 /100 WBCS — SIGNIFICANT CHANGE UP (ref 0–0)
PLATELET # BLD AUTO: 224 K/UL — SIGNIFICANT CHANGE UP (ref 150–400)
POTASSIUM SERPL-MCNC: 4.1 MMOL/L — SIGNIFICANT CHANGE UP (ref 3.5–5.3)
POTASSIUM SERPL-SCNC: 4.1 MMOL/L — SIGNIFICANT CHANGE UP (ref 3.5–5.3)
RBC # BLD: 4.06 M/UL — SIGNIFICANT CHANGE UP (ref 3.8–5.2)
RBC # FLD: 12.1 % — SIGNIFICANT CHANGE UP (ref 10.3–14.5)
SODIUM SERPL-SCNC: 137 MMOL/L — SIGNIFICANT CHANGE UP (ref 135–145)
WBC # BLD: 3.92 K/UL — SIGNIFICANT CHANGE UP (ref 3.8–10.5)
WBC # FLD AUTO: 3.92 K/UL — SIGNIFICANT CHANGE UP (ref 3.8–10.5)

## 2024-01-19 PROCEDURE — 85025 COMPLETE CBC W/AUTO DIFF WBC: CPT

## 2024-01-19 PROCEDURE — 99239 HOSP IP/OBS DSCHRG MGMT >30: CPT

## 2024-01-19 PROCEDURE — 96366 THER/PROPH/DIAG IV INF ADDON: CPT

## 2024-01-19 PROCEDURE — 96376 TX/PRO/DX INJ SAME DRUG ADON: CPT

## 2024-01-19 PROCEDURE — 86803 HEPATITIS C AB TEST: CPT

## 2024-01-19 PROCEDURE — G0378: CPT

## 2024-01-19 PROCEDURE — 99285 EMERGENCY DEPT VISIT HI MDM: CPT | Mod: 25

## 2024-01-19 PROCEDURE — 80048 BASIC METABOLIC PNL TOTAL CA: CPT

## 2024-01-19 PROCEDURE — 36415 COLL VENOUS BLD VENIPUNCTURE: CPT

## 2024-01-19 PROCEDURE — 85027 COMPLETE CBC AUTOMATED: CPT

## 2024-01-19 PROCEDURE — 96365 THER/PROPH/DIAG IV INF INIT: CPT

## 2024-01-19 PROCEDURE — 96375 TX/PRO/DX INJ NEW DRUG ADDON: CPT

## 2024-01-19 PROCEDURE — 80053 COMPREHEN METABOLIC PANEL: CPT

## 2024-01-19 PROCEDURE — 73130 X-RAY EXAM OF HAND: CPT

## 2024-01-19 RX ORDER — METRONIDAZOLE 500 MG
500 TABLET ORAL EVERY 8 HOURS
Refills: 0 | Status: DISCONTINUED | OUTPATIENT
Start: 2024-01-19 | End: 2024-01-22

## 2024-01-19 RX ORDER — ACETAMINOPHEN 500 MG
2 TABLET ORAL
Qty: 0 | Refills: 0 | DISCHARGE
Start: 2024-01-19

## 2024-01-19 RX ORDER — METRONIDAZOLE 500 MG
1 TABLET ORAL
Qty: 20 | Refills: 0
Start: 2024-01-19

## 2024-01-19 RX ADMIN — Medication 88 MICROGRAM(S): at 07:11

## 2024-01-19 RX ADMIN — Medication 500 MILLIGRAM(S): at 11:06

## 2024-01-19 RX ADMIN — Medication 100 MILLIGRAM(S): at 02:36

## 2024-01-19 RX ADMIN — Medication 650 MILLIGRAM(S): at 12:00

## 2024-01-19 RX ADMIN — Medication 100 MILLIGRAM(S): at 07:11

## 2024-01-19 RX ADMIN — Medication 650 MILLIGRAM(S): at 11:13

## 2024-01-19 RX ADMIN — FAMOTIDINE 20 MILLIGRAM(S): 10 INJECTION INTRAVENOUS at 08:01

## 2024-01-19 NOTE — DISCHARGE NOTE PROVIDER - NSDCMRMEDTOKEN_GEN_ALL_CORE_FT
acetaminophen 325 mg oral tablet: 2 tab(s) orally every 6 hours As needed Temp greater or equal to 38C (100.4F), Mild Pain (1 - 3)  armodafinil 150 mg oral tablet: 1 tab(s) orally once a day  armodafinil 50 mg oral tablet: 2 tab(s) orally once a day  doxycycline hyclate 100 mg oral tablet: 1 tab(s) orally  famotidine 20 mg oral tablet: 1 tab(s) orally 2 times a day  levothyroxine 88 mcg (0.088 mg) oral tablet: 1 orally once a day  metroNIDAZOLE 500 mg oral tablet: 1 tab(s) orally 3 times a day

## 2024-01-19 NOTE — DISCHARGE NOTE PROVIDER - HOSPITAL COURSE
Pt was admitted for IV abx for recent cat bite after being d/c home 2 days ago with oral Clindamycin and worsening symptoms at home. Pt given IV doses of doxy and Flagyl, switched to oral. Pt instructed to continue meds x7 days total. Noted to have history of hospitalization in past with cellulitis. Labs remained stable and pt remained hemodynamically stable for discharge home. She was in agreement with the plan.

## 2024-01-19 NOTE — DISCHARGE NOTE PROVIDER - NSDCPNSUBOBJ_GEN_ALL_CORE
74yo female with hx of Emphysema, lung cancer, Hypothyroidism, recent GI ulcer s/p EGD w/ bx revealing metaplasia, presented to the ED w/ complaint of right hand swelling/ redness and pain s/p cat bite 2 days ago, noted to have cellulitis    #Cellulitis  #Cat bite  -Changed from Clindamycin to Doxy + Flagyl  -XR reviewed with neg fractures  - pt tolerated medications and switched to PO abx. Stable for d/c home with 7 day course. Pt to f/u with PCP in 1 week    #Hypothyroidism  -c/w home Synthroid    #GI Ulcer  -recent  -c/w Pepcid BID    #VTE ppx  Low risk, SCDs    Discussed treatment plan with pt at bedside and pt was in agreement with the plan. All questions answered.

## 2024-01-19 NOTE — DISCHARGE NOTE PROVIDER - NSDCFUSCHEDAPPT_GEN_ALL_CORE_FT
Aidan Perkins  [facility] Physician Formerly [facility]  GASTRO [address]  Scheduled Appointment: 01/25/2024    Jessica Britt  [facility] Physician Formerly [facility]  PULMMED [address]  Scheduled Appointment: 01/31/2024    Ariane Short  [facility] Physician Formerly [facility]  [facility]  Scheduled Appointment: 02/01/2024    [facility]  DIAGRAD [address]  Scheduled Appointment: 03/04/2024    [facility]  BRSTIMAG [address]  Scheduled Appointment: 03/04/2024    [facility]  ULTRASND [address]  Scheduled Appointment: 03/04/2024

## 2024-01-19 NOTE — DISCHARGE NOTE PROVIDER - CARE PROVIDER_API CALL
Nan Tamez  Family Medicine  [address] [place] [address], [address]  [place] [address]  Phone: [phone]  Fax: [phone]  Follow Up Time: 7-10 Days

## 2024-01-19 NOTE — DISCHARGE NOTE PROVIDER - CARE PROVIDERS DIRECT ADDRESSES
[email or web address]

## 2024-01-19 NOTE — DISCHARGE NOTE NURSING/CASE MANAGEMENT/SOCIAL WORK - PATIENT PORTAL LINK FT
You can access the FollowMyHealth Patient Portal offered by [facility] by registering at the following website: [email or web address]. By joining [facility]’s FollowMyHealth portal, you will also be able to view your health information using other applications (apps) compatible with our system.

## 2024-01-19 NOTE — DISCHARGE NOTE PROVIDER - ATTENDING DISCHARGE PHYSICAL EXAMINATION:
Gen: sitting up in bed in NAD  HEART: RRR, no murmur noted  LUNGS: CTAB, no wheezes noted  ABD: soft, nontender, nondistended  MSK: moves all extremities. no edema to BLE. mild edema noted to R dorsum hand with mild erythema surrounding 2 bite marks - closed appearing wounds. L forearm with mild fluid likely 2/2 IV. mild TTP R dorsum R hand  Neuro: no focal deficits,  strength 4/5 bilat hands  Psych: affect appropriate, A&Ox3

## 2024-01-19 NOTE — DISCHARGE NOTE PROVIDER - NSDCCPCAREPLAN_GEN_ALL_CORE_FT
PRINCIPAL DISCHARGE DIAGNOSIS  Diagnosis: Cellulitis of hand  Assessment and Plan of Treatment: You were switched from Clindamycin to Doxycycline and Flagyl antibiotics. You are to complete a total of 7 days from initiation of antibiotics. Take Flagyl 3x daily and Doxycycline 2x daily. Use Tylenol as need for pain/swelling. Use warm compresses as well. Follow up with your PCP in 1 week after completing course.

## 2024-01-20 NOTE — CHART NOTE - NSCHARTNOTEFT_GEN_A_CORE
ED  called pt to assist with follow up appt.  Pt is a 74 y/o female presented to ED for hand injury due to animal bite.  Pt did not respond to the phone call, left VM for further assistance.

## 2024-01-25 ENCOUNTER — APPOINTMENT (OUTPATIENT)
Dept: GASTROENTEROLOGY | Facility: CLINIC | Age: 74
End: 2024-01-25

## 2024-01-29 ENCOUNTER — NON-APPOINTMENT (OUTPATIENT)
Age: 74
End: 2024-01-29

## 2024-01-30 LAB
ALBUMIN SERPL ELPH-MCNC: 4.7 G/DL
ALP BLD-CCNC: 108 U/L
ALT SERPL-CCNC: 27 U/L
ANION GAP SERPL CALC-SCNC: 11 MMOL/L
AST SERPL-CCNC: 26 U/L
BILIRUB SERPL-MCNC: 0.2 MG/DL
BUN SERPL-MCNC: 18 MG/DL
CALCIUM SERPL-MCNC: 9 MG/DL
CHLORIDE SERPL-SCNC: 96 MMOL/L
CO2 SERPL-SCNC: 25 MMOL/L
CREAT SERPL-MCNC: 0.54 MG/DL
EGFR: 98 ML/MIN/1.73M2
GLUCOSE SERPL-MCNC: 118 MG/DL
POTASSIUM SERPL-SCNC: 4.5 MMOL/L
PROT SERPL-MCNC: 6.5 G/DL
SODIUM SERPL-SCNC: 133 MMOL/L

## 2024-01-31 ENCOUNTER — APPOINTMENT (OUTPATIENT)
Dept: PULMONOLOGY | Facility: CLINIC | Age: 74
End: 2024-01-31
Payer: MEDICARE

## 2024-01-31 DIAGNOSIS — J06.9 ACUTE UPPER RESPIRATORY INFECTION, UNSPECIFIED: ICD-10-CM

## 2024-01-31 PROCEDURE — 99443: CPT

## 2024-01-31 NOTE — HISTORY OF PRESENT ILLNESS
[de-identified] : 2/2021-Patient presented to her dermatologist ([facility])-had full body scan, for screening evaluation-found suspicious lesion right side.\par  Had biopsy of right flank lesion with pathology revealing melanoma measuring 0.32 mm.  No vascular or neuro invasion or ulceration.  She subsequently had excision of the area with pathology revealing no residual melanocytic proliferation.  Margins free (portion of skin that 4.7 x 2 x 0.8 cm).  Dermatologist recommended continued surveillance.\par  \par  11/16/21-CT chest-3.7 cm superior right lower lobe part solid, cystic and groundglass lesion with 5 mm solid component on mediastinal windows, concerning for an adenocarcinoma spectrum lesion. Numerous other solid and groundglass nodules measuring up to 6 mm can be reassessed at that time.\par  \par  11/22/21-PET/CT scan-part solid, cystic and groundglass lesions in superior segment right lower lobe, unchanged and better evaluated on CT from 11/15/2021, with background FDG avidity; this is indeterminate. Malignancy with low FDG avidity like adenocarcinoma in situ is not excluded. Additional scattered subcentimeter bilateral solid and groundglass nodules, too small to characterize are nonspecific. Fibroid uterus with no associated abnormal FDG activity.\par  \par  3/7/22-s/p Right VATS robotic-assisted superior segmentectomy with lymph node dissection. 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 Stage 1A1 (T1miN0).\par  \par   [de-identified] : melanoma [de-identified] :   --S/P GI evaluation for stomach "pains." S/P recent EGD-had ulcer. States pain now resolved. Taking famotidine BID. Has reduced caffeine intake. Refuses colonoscopy at this time (got sick from prep 2022.and didn't reschedule). Sees dermatologist regularly. No c/o CP, SOB at rest or increased cough.  No abnormal bruising or bleeding reported.  Is anxious.  Brother Jose works for [employer]-in research.

## 2024-01-31 NOTE — ASSESSMENT
[FreeTextEntry1] : CBC, CMP, 1/11/24 thoracic surgery note reviewed. 2/2021 Stage T1a (1A) malignant melanoma-no vascular or neural invasion or ulceration on pathology. Status post wide excision with no residual lesion. Remains on surveillance-continue f/u with dermatology.  11/2021-RLL nodule on CT; PET/CT-1. Part solid, cystic and groundglass lesions in superior segment right lower lobe, unchanged and better evaluated on CT from 11/15/2021, with background FDG avidity; this is indeterminate. Malignancy with low FDG avidity like adenocarcinoma in situ is not excluded. Additional scattered subcentimeter bilateral solid and groundglass nodules, too small to characterize are nonspecific. 2. Fibroid uterus with no associated abnormal FDG activity.. 3/7/22-s/p Right VATS robotic-assisted superior segmentectomy with lymph node dissection. 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 Stage 1A1 (T1miN0).   1/9/2023 CT chest-resolution of trace right pleural effusion and decreased density surrounding the right lower lobe staple line. Stable groundglass and solid lung nodules. No new or enlarging lung nodule. Emphysema. 7/2/23-CT chest-No pulmonary embolism. Stable exam status post right lower lobe wedge resection. 1/5/2024-CT chest-Next CT chest per thoracic surgeon 1/2025. New cluster of very small left lower lobe nodules, likely mucus impacted bronchioles. Unchanged other small lung nodules. --clinically stable at present; surveillance.  Patient was given the opportunity to ask questions. Her many questions have been answered to her apparent satisfaction at this time.  -->RTO 6 months.

## 2024-02-01 ENCOUNTER — APPOINTMENT (OUTPATIENT)
Dept: RADIOLOGY | Facility: HOSPITAL | Age: 74
End: 2024-02-01
Payer: MEDICARE

## 2024-02-01 ENCOUNTER — OUTPATIENT (OUTPATIENT)
Dept: OUTPATIENT SERVICES | Facility: HOSPITAL | Age: 74
LOS: 1 days | End: 2024-02-01
Payer: MEDICARE

## 2024-02-01 ENCOUNTER — APPOINTMENT (OUTPATIENT)
Dept: HEMATOLOGY ONCOLOGY | Facility: CLINIC | Age: 74
End: 2024-02-01
Payer: MEDICARE

## 2024-02-01 DIAGNOSIS — C43.9 MALIGNANT MELANOMA OF SKIN, UNSPECIFIED: Chronic | ICD-10-CM

## 2024-02-01 DIAGNOSIS — Z98.890 OTHER SPECIFIED POSTPROCEDURAL STATES: Chronic | ICD-10-CM

## 2024-02-01 DIAGNOSIS — J06.9 ACUTE UPPER RESPIRATORY INFECTION, UNSPECIFIED: ICD-10-CM

## 2024-02-01 PROCEDURE — 71046 X-RAY EXAM CHEST 2 VIEWS: CPT

## 2024-02-01 PROCEDURE — 71046 X-RAY EXAM CHEST 2 VIEWS: CPT | Mod: 26

## 2024-02-02 ENCOUNTER — NON-APPOINTMENT (OUTPATIENT)
Age: 74
End: 2024-02-02

## 2024-02-02 LAB
DEPRECATED D DIMER PPP IA-ACNC: 210 NG/ML DDU
FLUAV H3 RNA SPEC QL NAA+PROBE: DETECTED
RAPID RVP RESULT: DETECTED
SARS-COV-2 RNA PNL RESP NAA+PROBE: NOT DETECTED

## 2024-02-12 ENCOUNTER — APPOINTMENT (OUTPATIENT)
Dept: HEMATOLOGY ONCOLOGY | Facility: CLINIC | Age: 74
End: 2024-02-12
Payer: MEDICARE

## 2024-02-12 VITALS
RESPIRATION RATE: 18 BRPM | OXYGEN SATURATION: 97 % | BODY MASS INDEX: 20.27 KG/M2 | WEIGHT: 114.42 LBS | DIASTOLIC BLOOD PRESSURE: 68 MMHG | HEART RATE: 64 BPM | SYSTOLIC BLOOD PRESSURE: 117 MMHG | TEMPERATURE: 98.5 F

## 2024-02-12 DIAGNOSIS — R53.83 OTHER FATIGUE: ICD-10-CM

## 2024-02-12 PROCEDURE — 99214 OFFICE O/P EST MOD 30 MIN: CPT

## 2024-02-12 NOTE — ASSESSMENT
[FreeTextEntry1] : CBC, CMP, EGD report reviewed. 2/2021 Stage T1a (1A) malignant melanoma-no vascular or neural invasion or ulceration on pathology. Status post wide excision with no residual lesion. Remains on surveillance-continue f/u with dermatology.  11/2021-RLL nodule on CT; PET/CT-1. Part solid, cystic and groundglass lesions in superior segment right lower lobe, unchanged and better evaluated on CT from 11/15/2021, with background FDG avidity; this is indeterminate. Malignancy with low FDG avidity like adenocarcinoma in situ is not excluded. Additional scattered subcentimeter bilateral solid and groundglass nodules, too small to characterize are nonspecific. 2. Fibroid uterus with no associated abnormal FDG activity.. 3/7/22-s/p Right VATS robotic-assisted superior segmentectomy with lymph node dissection. 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 Stage 1A1 (T1miN0).   1/9/2023 CT chest-resolution of trace right pleural effusion and decreased density surrounding the right lower lobe staple line. Stable groundglass and solid lung nodules. No new or enlarging lung nodule. Emphysema. 7/2/23-CT chest-No pulmonary embolism. Stable exam status post right lower lobe wedge resection. Next CT chest per thoracic surgeon 1/2024 1/5/2024 CT chest -new cluster of very small left lower lobe nodules, likely mucous impacted bronchioles.  Due to flu symptoms, she repeated CXR on 2/1/2024 and it showed no focal consolidation was seen. but she had positive Influenza with AH1.  --respiratory symptoms improved, clinically stable at present, surveillance. Patient to follow with pulmonology for occasional dyspnea.    #bloating/stomach issue  11/22/2023 CT A/P- distal stomach appears wall-thickened, This may reflect gastritis, although would be better evaluated endoscopically.  12/20/2023 egd showed,   -gastric ulcer and biopsty showed reactive gastropathy with focal instetinal metaplasia.  -Distal esophagus biopsy showed, squamous mucosa with scattered chronic inflammatory cells.  --Due to persistent abdominal bloating feeling, will repeat US abdomen  --patient has follow up with GI (Aidan Sim) on 2/23/2024.   Patient was given the opportunity to ask questions. Her many questions have been answered to her apparent satisfaction at this time.  -->RTC 3 months. pt to have blood work a week prior to follow up appointment.

## 2024-02-12 NOTE — HISTORY OF PRESENT ILLNESS
[Disease: _____________________] : Disease: [unfilled] [T: ___] : T[unfilled] [AJCC Stage: ____] : AJCC Stage: [unfilled] [de-identified] : 2/2021-Patient presented to her dermatologist ([facility])-had full body scan, for screening evaluation-found suspicious lesion right side. Had biopsy of right flank lesion with pathology revealing melanoma measuring 0.32 mm.  No vascular or neuro invasion or ulceration.  She subsequently had excision of the area with pathology revealing no residual melanocytic proliferation.  Margins free (portion of skin that 4.7 x 2 x 0.8 cm).  Dermatologist recommended continued surveillance.  11/16/21-CT chest-3.7 cm superior right lower lobe part solid, cystic and groundglass lesion with 5 mm solid component on mediastinal windows, concerning for an adenocarcinoma spectrum lesion. Numerous other solid and groundglass nodules measuring up to 6 mm can be reassessed at that time.  11/22/21-PET/CT scan-part solid, cystic and groundglass lesions in superior segment right lower lobe, unchanged and better evaluated on CT from 11/15/2021, with background FDG avidity; this is indeterminate. Malignancy with low FDG avidity like adenocarcinoma in situ is not excluded. Additional scattered subcentimeter bilateral solid and groundglass nodules, too small to characterize are nonspecific. Fibroid uterus with no associated abnormal FDG activity.  3/7/22-s/p Right VATS robotic-assisted superior segmentectomy with lymph node dissection. 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 Stage 1A1 (T1miN0).   [de-identified] : melanoma [de-identified] : S/P GI evaluation for stomach "pains." S/P recent EGD-had ulcer. States pain now resolved. Taking famotidine BID. Has reduced caffeine intake. Refuses colonoscopy at this time (got sick from prep 2022.and didn't reschedule). Sees dermatologist regularly for history of melanoma. recently confirmed to have Influenza AH1 on 2/2/2024 but her symptoms have been improved except some tiredness and dyspnea when she does chores.  Has been feeling bloating/gaseous.  Is anxious.  Brother Jose works for [employer]-in research.

## 2024-02-12 NOTE — REVIEW OF SYSTEMS
[Negative] : Allergic/Immunologic [Fatigue] : fatigue [SOB on Exertion] : shortness of breath during exertion [Fever] : no fever [Chills] : no chills [Night Sweats] : no night sweats [Wheezing] : no wheezing [Cough] : no cough [FreeTextEntry7] : +bloating feeling

## 2024-02-23 ENCOUNTER — APPOINTMENT (OUTPATIENT)
Dept: GASTROENTEROLOGY | Facility: CLINIC | Age: 74
End: 2024-02-23
Payer: MEDICARE

## 2024-02-23 VITALS
DIASTOLIC BLOOD PRESSURE: 85 MMHG | WEIGHT: 113 LBS | OXYGEN SATURATION: 97 % | BODY MASS INDEX: 20.02 KG/M2 | HEART RATE: 59 BPM | SYSTOLIC BLOOD PRESSURE: 130 MMHG | TEMPERATURE: 98 F | HEIGHT: 63 IN | RESPIRATION RATE: 16 BRPM

## 2024-02-23 DIAGNOSIS — R10.13 EPIGASTRIC PAIN: ICD-10-CM

## 2024-02-23 DIAGNOSIS — K59.09 OTHER CONSTIPATION: ICD-10-CM

## 2024-02-23 PROCEDURE — 99215 OFFICE O/P EST HI 40 MIN: CPT

## 2024-02-23 NOTE — HISTORY OF PRESENT ILLNESS
[de-identified] : CT ABDOMEN AND PELVIS IC ORDERED BY: LION BENDER  PROCEDURE DATE: 11/22/2023    INTERPRETATION: CLINICAL INFORMATION: Abdominal pain.  COMPARISON: 4/15/2023 ultrasound.  CONTRAST/COMPLICATIONS: IV Contrast: Omnipaque 350 90 cc administered 10 cc discarded Oral Contrast: NONE Complications: None reported at time of study completion  PROCEDURE: CT of the Abdomen and Pelvis was performed. Sagittal and coronal reformats were performed.  FINDINGS: LOWER CHEST: Minimal tree-in-bud opacities within the left lower lobe.  LIVER: Within normal limits. BILE DUCTS: Normal caliber. GALLBLADDER: Within normal limits. SPLEEN: Within normal limits. PANCREAS: Within normal limits. ADRENALS: Within normal limits. KIDNEYS/URETERS: No hydronephrosis. A 3 mm nonobstructive stone of the upper pole right kidney  BLADDER: Within normal limits. REPRODUCTIVE ORGANS: Uterine fibroid.  BOWEL: Distal stomach appears wall thickened (4, 21-22). No bowel obstruction. Large volume stool throughout the colon. Appendix is normal. PERITONEUM: No ascites. VESSELS: Within normal limits. RETROPERITONEUM/LYMPH NODES: No lymphadenopathy. ABDOMINAL WALL: Within normal limits. BONES: Degenerative changes of the spine.  IMPRESSION: 1. Distal stomach appears wall-thickened. This may reflect gastritis, although would be better evaluated endoscopically. 2. Constipation. [FreeTextEntry1] : 2016

## 2024-02-23 NOTE — ASSESSMENT
[FreeTextEntry1] : CT with mild gastric thickening likely related to gastritis with  with IM  Excess bloating: retrial of Iberogast  Continue twice daily famotidine for upper pain.  The patient seems to have longstanding functional GI issues as well. I have prescribed cognitive behavioral therapy.  I have, once again, spent a great deal of time discussing the role of daily high-intensity exercise with the patient. We discussed behavior modification strategies to institute this habit. I have discussed nutrition in great detail including consuming vegetable fibers on a daily basis and limiting simple carbohydrates 5 days per week. We have also reviewed the benefits of soluble fiber supplementation, including (but not limited to), favorable effects on lipid profile, weight control and the salutary effects on colonic microbiota. We reviewed the effects of such daily habits on metabolism and the metabolic set point resulting in a healthy weight, decreased pain sensitivity (effecting the GI tract), bowel habits and other aspects of health. I recommended a mindful meditation practice and reviewed behavior modification strategies.  EGD to document healing in June after May f/u

## 2024-02-23 NOTE — PHYSICAL EXAM
[Alert] : alert [Normal Voice/Communication] : normal voice/communication [Healthy Appearing] : healthy appearing [No Acute Distress] : no acute distress [Sclera] : the sclera and conjunctiva were normal [Hearing Threshold Finger Rub Not [place]] : hearing was normal [Normal Lips/Gums] : the lips and gums were normal [No Neck Mass] : no neck mass was observed [Normal Appearance] : the appearance of the neck was normal [Oropharynx] : the oropharynx was normal [No Respiratory Distress] : no respiratory distress [No Acc Muscle Use] : no accessory muscle use [Respiration, Rhythm And Depth] : normal respiratory rhythm and effort [Heart Rate And Rhythm] : heart rate was normal and rhythm regular [Auscultation Breath Sounds / Voice Sounds] : lungs were clear to auscultation bilaterally [Normal S1, S2] : normal S1 and S2 [Murmurs] : no murmurs [Bowel Sounds] : normal bowel sounds [Abdomen Tenderness] : non-tender [No Masses] : no abdominal mass palpated [Abdomen Soft] : soft [Oriented To Time, Place, And Person] : oriented to person, place, and time [] : no hepatosplenomegaly

## 2024-02-26 ENCOUNTER — APPOINTMENT (OUTPATIENT)
Dept: ULTRASOUND IMAGING | Facility: HOSPITAL | Age: 74
End: 2024-02-26

## 2024-04-09 ENCOUNTER — APPOINTMENT (OUTPATIENT)
Dept: RADIOLOGY | Facility: HOSPITAL | Age: 74
End: 2024-04-09

## 2024-04-09 ENCOUNTER — OUTPATIENT (OUTPATIENT)
Dept: OUTPATIENT SERVICES | Facility: HOSPITAL | Age: 74
LOS: 1 days | End: 2024-04-09
Payer: MEDICARE

## 2024-04-09 DIAGNOSIS — Z98.890 OTHER SPECIFIED POSTPROCEDURAL STATES: Chronic | ICD-10-CM

## 2024-04-09 DIAGNOSIS — Z00.8 ENCOUNTER FOR OTHER GENERAL EXAMINATION: ICD-10-CM

## 2024-04-09 DIAGNOSIS — C43.9 MALIGNANT MELANOMA OF SKIN, UNSPECIFIED: Chronic | ICD-10-CM

## 2024-04-09 PROCEDURE — 71100 X-RAY EXAM RIBS UNI 2 VIEWS: CPT

## 2024-04-09 PROCEDURE — 71100 X-RAY EXAM RIBS UNI 2 VIEWS: CPT | Mod: 26,LT

## 2024-04-12 ENCOUNTER — APPOINTMENT (OUTPATIENT)
Dept: CARDIOLOGY | Facility: CLINIC | Age: 74
End: 2024-04-12
Payer: MEDICARE

## 2024-04-12 VITALS
HEIGHT: 63 IN | BODY MASS INDEX: 19.84 KG/M2 | WEIGHT: 112 LBS | DIASTOLIC BLOOD PRESSURE: 75 MMHG | HEART RATE: 62 BPM | OXYGEN SATURATION: 99 % | SYSTOLIC BLOOD PRESSURE: 129 MMHG

## 2024-04-12 DIAGNOSIS — H53.9 UNSPECIFIED VISUAL DISTURBANCE: ICD-10-CM

## 2024-04-12 PROCEDURE — 99214 OFFICE O/P EST MOD 30 MIN: CPT

## 2024-04-12 PROCEDURE — 93246 EXT ECG>7D<15D RECORDING: CPT

## 2024-04-12 PROCEDURE — 93000 ELECTROCARDIOGRAM COMPLETE: CPT

## 2024-04-12 PROCEDURE — G2211 COMPLEX E/M VISIT ADD ON: CPT

## 2024-04-13 PROBLEM — H53.9 VISUAL CHANGES: Status: ACTIVE | Noted: 2024-04-13

## 2024-04-13 NOTE — PHYSICAL EXAM
[General Appearance - Well Developed] : well developed [Normal Appearance] : normal appearance [Well Groomed] : well groomed [General Appearance - Well Nourished] : well nourished [No Deformities] : no deformities [General Appearance - In No Acute Distress] : no acute distress [Normal Conjunctiva] : the conjunctiva exhibited no abnormalities [Eyelids - No Xanthelasma] : the eyelids demonstrated no xanthelasmas [Normal Oral Mucosa] : normal oral mucosa [No Oral Pallor] : no oral pallor [No Oral Cyanosis] : no oral cyanosis [Normal Jugular Venous A Waves Present] : normal jugular venous A waves present [Normal Jugular Venous V Waves Present] : normal jugular venous V waves present [No Jugular Venous Alonzo A Waves] : no jugular venous alonzo A waves [Respiration, Rhythm And Depth] : normal respiratory rhythm and effort [Exaggerated Use Of Accessory Muscles For Inspiration] : no accessory muscle use [Auscultation Breath Sounds / Voice Sounds] : lungs were clear to auscultation bilaterally [Heart Rate And Rhythm] : heart rate and rhythm were normal [Heart Sounds] : normal S1 and S2 [Murmurs] : no murmurs present [Abdomen Tenderness] : non-tender [Abdomen Soft] : soft [Abdomen Mass (___ Cm)] : no abdominal mass palpated [Abnormal Walk] : normal gait [Gait - Sufficient For Exercise Testing] : the gait was sufficient for exercise testing [Nail Clubbing] : no clubbing of the fingernails [Cyanosis, Localized] : no localized cyanosis [Petechial Hemorrhages (___cm)] : no petechial hemorrhages [Skin Color & Pigmentation] : normal skin color and pigmentation [] : no rash [No Venous Stasis] : no venous stasis [Skin Lesions] : no skin lesions [No Skin Ulcers] : no skin ulcer [No Xanthoma] : no  xanthoma was observed [Oriented To Time, Place, And Person] : oriented to person, place, and time [Affect] : the affect was normal [Mood] : the mood was normal [FreeTextEntry1] : mild appropriate anxiety

## 2024-04-13 NOTE — ASSESSMENT
[FreeTextEntry1] : I have asked   Shala   to undergo detailed cardiac testing in order to evaluate her overall cardiovascular risk. An assessment of both structural and functional heart disease was recommended to the patient. In this regard, a stress test in cardiac MRI were advised to the patient. we are concerned about melanoma and metastatic spread to the pericardium. The stresses has returned satisfactory although the achieved heart rate was non-diagnostic. Without evidence or recent infarction overt heart failure or unstable angina and based upon a satisfactory stress test Ms. Hale may undergo planned evaluation of the lung nodule which constitutes hi risk surgery in a patient with intermediate cardiac risk at an ASA class II or III anesthesia risk  Addendum 6/10/2022 The D-dimer was borderline given Shala's age and in fact a CTA was negative for pulmonary embolism we will continue to treat empirically with nonsteroidals and anti-inflammatories for some pleuritic -like pains follow-up with Dr. Hendrix for further oncology evaluations in the second setting of a carcinoma which has been resected  Addendum 3/1/2023 Given complaints of dyspnea on exertion and a family history of coronary disease I have asked Shala to undergo a cardiac CTA in order to exclude underlying coronary disease she has never been a smoker and the finding of emphysema is unclear previous noninvasive ischemic work-up has been unrevealing and an imaging procedure would be helpful here such as a cardiac CTA.  Blood work requested including brain naturetic peptide  Addendum 7/14/2023 Multiple complaints of unclear etiology clearly Lyme to be excluded requesting blood work from Dr. Tamez underwent testing for babesiosis  4/12/24 mutliplle somatic complaints inculding rib pains and shortness of breath. we are concerned for TIA and stroke although presentation is atypical . would obtian event monitor to exclude paroxysmal afib.   medical necessity this is a high-risk encounter based upon the need to assess multiple somatic complaints of unknown clear etiology and review of multiple labs both from in hospital and from the med station   an event monitor is hooked up today

## 2024-04-13 NOTE — REASON FOR VISIT
[Symptom and Test Evaluation] : symptom and test evaluation [Abnormal Test Result] : an abnormal test result [FreeTextEntry1] : Shala tells me that surgery is planned on 125 she is saying [place] a CAT scan is planned one week before Deondre feels that if the lesion is stable and surgery may be postponed in the rescan planned in 3 to 4 months Shala notes a cough covid was negative she feels occasional odd sensation under the rib cage over the past two years which is unexplained when she takes a deep breath  Update 6/10/2022 Shala was recently seen in hospital with some shortness of breath the D-dimer was borderline elevated and a CTA to rule out pulmonary embolism was negative she inquires about some pleuritic -like pains which most likely represent postoperative inflammation  Update 7/21/2023 Shala tells me that she feels winded dizzy lightheaded she feels a pressure in her chest she underwent an evaluation in emergency room the D-dimer was 209 troponin 7.4/5.6 which is low a PET scan is planned. she was felt to have a stage I lung cancer which was caught early   4/12/24 Shala felt winded since her lung surgery. She apparently walked into a door and suffered some left rib. A fracture was excluded with a negative x-ray. after the imaging procedure she apparently walked into a door she's had some blurred vision and hit her nose. ldl 79 bs 113 hb 12.1. we note a diagnosis of "melanoma" on path and a recent ct scan neg pericardial effusion.

## 2024-04-26 ENCOUNTER — APPOINTMENT (OUTPATIENT)
Dept: GASTROENTEROLOGY | Facility: CLINIC | Age: 74
End: 2024-04-26
Payer: MEDICARE

## 2024-04-26 VITALS
RESPIRATION RATE: 16 BRPM | WEIGHT: 115 LBS | HEART RATE: 62 BPM | OXYGEN SATURATION: 100 % | SYSTOLIC BLOOD PRESSURE: 137 MMHG | BODY MASS INDEX: 20.38 KG/M2 | TEMPERATURE: 98 F | DIASTOLIC BLOOD PRESSURE: 81 MMHG | HEIGHT: 63 IN

## 2024-04-26 DIAGNOSIS — R07.89 OTHER CHEST PAIN: ICD-10-CM

## 2024-04-26 DIAGNOSIS — K25.3 ACUTE GASTRIC ULCER W/OUT HEMORRHAGE OR PERFORATION: ICD-10-CM

## 2024-04-26 DIAGNOSIS — R14.0 ABDOMINAL DISTENSION (GASEOUS): ICD-10-CM

## 2024-04-26 DIAGNOSIS — K31.A0 GASTRIC INTESTINAL METAPLASIA, UNSPECIFIED: ICD-10-CM

## 2024-04-26 PROCEDURE — 99215 OFFICE O/P EST HI 40 MIN: CPT

## 2024-04-26 NOTE — ASSESSMENT
[FreeTextEntry1] : CT with mild gastric thickening likely related to gastritis with  with IM  Excess bloating: continue Iberogast  Decrease famotidine to PM dose  The patient seems to have longstanding functional GI issues as well. I have prescribed cognitive behavioral therapy.  I have, once again, spent time discussing the role of daily high-intensity exercise with the patient. We discussed behavior modification strategies to institute this habit. I have discussed nutrition in great detail including consuming vegetable fibers on a daily basis and limiting simple carbohydrates 5 days per week. We have also reviewed the benefits of soluble fiber supplementation, including (but not limited to), favorable effects on lipid profile, weight control and the salutary effects on colonic microbiota. We reviewed the effects of such daily habits on metabolism and the metabolic set point resulting in a healthy weight, decreased pain sensitivity (effecting the GI tract), bowel habits and other aspects of health. I recommended a mindful meditation practice and reviewed behavior modification strategies.  EGD to document healing and survey IM.

## 2024-04-26 NOTE — PHYSICAL EXAM

## 2024-04-26 NOTE — REVIEW OF SYSTEMS
[SOB on Exertion] : shortness of breath during exertion [Negative] : Heme/Lymph [FreeTextEntry5] : being w/u for chest pain

## 2024-04-26 NOTE — HISTORY OF PRESENT ILLNESS
[FreeTextEntry1] : 2016 [de-identified] : CT ABDOMEN AND PELVIS IC ORDERED BY: LION BENDER  PROCEDURE DATE: 11/22/2023    INTERPRETATION: CLINICAL INFORMATION: Abdominal pain.  COMPARISON: 4/15/2023 ultrasound.  CONTRAST/COMPLICATIONS: IV Contrast: Omnipaque 350 90 cc administered 10 cc discarded Oral Contrast: NONE Complications: None reported at time of study completion  PROCEDURE: CT of the Abdomen and Pelvis was performed. Sagittal and coronal reformats were performed.  FINDINGS: LOWER CHEST: Minimal tree-in-bud opacities within the left lower lobe.  LIVER: Within normal limits. BILE DUCTS: Normal caliber. GALLBLADDER: Within normal limits. SPLEEN: Within normal limits. PANCREAS: Within normal limits. ADRENALS: Within normal limits. KIDNEYS/URETERS: No hydronephrosis. A 3 mm nonobstructive stone of the upper pole right kidney  BLADDER: Within normal limits. REPRODUCTIVE ORGANS: Uterine fibroid.  BOWEL: Distal stomach appears wall thickened (4, 21-22). No bowel obstruction. Large volume stool throughout the colon. Appendix is normal. PERITONEUM: No ascites. VESSELS: Within normal limits. RETROPERITONEUM/LYMPH NODES: No lymphadenopathy. ABDOMINAL WALL: Within normal limits. BONES: Degenerative changes of the spine.  IMPRESSION: 1. Distal stomach appears wall-thickened. This may reflect gastritis, although would be better evaluated endoscopically. 2. Constipation.

## 2024-04-30 ENCOUNTER — OUTPATIENT (OUTPATIENT)
Dept: OUTPATIENT SERVICES | Facility: HOSPITAL | Age: 74
LOS: 1 days | Discharge: HOME | End: 2024-04-30

## 2024-04-30 DIAGNOSIS — Z98.890 OTHER SPECIFIED POSTPROCEDURAL STATES: Chronic | ICD-10-CM

## 2024-04-30 DIAGNOSIS — C43.9 MALIGNANT MELANOMA OF SKIN, UNSPECIFIED: Chronic | ICD-10-CM

## 2024-04-30 DIAGNOSIS — C43.9 MALIGNANT MELANOMA OF SKIN, UNSPECIFIED: ICD-10-CM

## 2024-05-07 LAB
ALBUMIN SERPL ELPH-MCNC: 4.4 G/DL
ALP BLD-CCNC: 89 U/L
ALT SERPL-CCNC: 20 U/L
ANION GAP SERPL CALC-SCNC: 12 MMOL/L
AST SERPL-CCNC: 18 U/L
BASOPHILS # BLD AUTO: 0.03 K/UL
BASOPHILS NFR BLD AUTO: 0.6 %
BILIRUB SERPL-MCNC: 0.3 MG/DL
BUN SERPL-MCNC: 20 MG/DL
CALCIUM SERPL-MCNC: 9.3 MG/DL
CHLORIDE SERPL-SCNC: 99 MMOL/L
CO2 SERPL-SCNC: 22 MMOL/L
CREAT SERPL-MCNC: 0.52 MG/DL
EGFR: 99 ML/MIN/1.73M2
EOSINOPHIL # BLD AUTO: 0.06 K/UL
EOSINOPHIL NFR BLD AUTO: 1.2 %
GLUCOSE SERPL-MCNC: 90 MG/DL
HCT VFR BLD CALC: 36.6 %
HGB BLD-MCNC: 12.1 G/DL
IMM GRANULOCYTES NFR BLD AUTO: 0.2 %
LYMPHOCYTES # BLD AUTO: 1.21 K/UL
LYMPHOCYTES NFR BLD AUTO: 24.1 %
MAN DIFF?: NORMAL
MCHC RBC-ENTMCNC: 30.9 PG
MCHC RBC-ENTMCNC: 33.1 GM/DL
MCV RBC AUTO: 93.4 FL
MONOCYTES # BLD AUTO: 0.51 K/UL
MONOCYTES NFR BLD AUTO: 10.2 %
NEUTROPHILS # BLD AUTO: 3.2 K/UL
NEUTROPHILS NFR BLD AUTO: 63.7 %
PLATELET # BLD AUTO: 238 K/UL
POTASSIUM SERPL-SCNC: 4.2 MMOL/L
PROT SERPL-MCNC: 6.7 G/DL
RBC # BLD: 3.92 M/UL
RBC # FLD: 12.6 %
SODIUM SERPL-SCNC: 133 MMOL/L
WBC # FLD AUTO: 5.02 K/UL

## 2024-05-08 ENCOUNTER — APPOINTMENT (OUTPATIENT)
Dept: HEMATOLOGY ONCOLOGY | Facility: CLINIC | Age: 74
End: 2024-05-08
Payer: MEDICARE

## 2024-05-08 VITALS
BODY MASS INDEX: 20.78 KG/M2 | HEART RATE: 61 BPM | TEMPERATURE: 97.9 F | RESPIRATION RATE: 16 BRPM | WEIGHT: 117.26 LBS | SYSTOLIC BLOOD PRESSURE: 134 MMHG | DIASTOLIC BLOOD PRESSURE: 75 MMHG | OXYGEN SATURATION: 100 %

## 2024-05-08 DIAGNOSIS — C34.90 MALIGNANT NEOPLASM OF UNSPECIFIED PART OF UNSPECIFIED BRONCHUS OR LUNG: ICD-10-CM

## 2024-05-08 PROCEDURE — 99214 OFFICE O/P EST MOD 30 MIN: CPT

## 2024-05-08 NOTE — HISTORY OF PRESENT ILLNESS
[Disease: _____________________] : Disease: [unfilled] [T: ___] : T[unfilled] [AJCC Stage: ____] : AJCC Stage: [unfilled] [de-identified] : 2/2021-Patient presented to her dermatologist ([facility])-had full body scan, for screening evaluation-found suspicious lesion right side.\par  Had biopsy of right flank lesion with pathology revealing melanoma measuring 0.32 mm.  No vascular or neuro invasion or ulceration.  She subsequently had excision of the area with pathology revealing no residual melanocytic proliferation.  Margins free (portion of skin that 4.7 x 2 x 0.8 cm).  Dermatologist recommended continued surveillance.\par  \par  11/16/21-CT chest-3.7 cm superior right lower lobe part solid, cystic and groundglass lesion with 5 mm solid component on mediastinal windows, concerning for an adenocarcinoma spectrum lesion. Numerous other solid and groundglass nodules measuring up to 6 mm can be reassessed at that time.\par  \par  11/22/21-PET/CT scan-part solid, cystic and groundglass lesions in superior segment right lower lobe, unchanged and better evaluated on CT from 11/15/2021, with background FDG avidity; this is indeterminate. Malignancy with low FDG avidity like adenocarcinoma in situ is not excluded. Additional scattered subcentimeter bilateral solid and groundglass nodules, too small to characterize are nonspecific. Fibroid uterus with no associated abnormal FDG activity.\par  \par  3/7/22-s/p Right VATS robotic-assisted superior segmentectomy with lymph node dissection. 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 Stage 1A1 (T1miN0).\par  \par   [de-identified] : melanoma [de-identified] : Seeing GI for "bloating" symptoms. Told has ulcer-on treatment, with f/u EGD planned in June. Refuses colonoscopy at this time (got sick from prep 2022 and didn't reschedule). Sees dermatologist regularly. No c/o CP, SOB at rest or increased cough.  No abnormal bruising or bleeding reported.  Is anxious.  Brother Jose works for [employer].

## 2024-05-08 NOTE — ASSESSMENT
[FreeTextEntry1] : CBC, CMP, 4/26/24 GI note, 4/12/24 cardiology note, 1/11/24 thoracic surgery note, CT chest report reviewed.  #1) 2/2021 Stage T1a (1A) malignant melanoma-no vascular or neural invasion or ulceration on pathology. Status post wide excision with no residual lesion. Remains on surveillance-continue f/u with dermatology.  11/2021-RLL nodule on CT; PET/CT-1. Part solid, cystic and groundglass lesions in superior segment right lower lobe, unchanged and better evaluated on CT from 11/15/2021, with background FDG avidity; this is indeterminate. Malignancy with low FDG avidity like adenocarcinoma in situ is not excluded. Additional scattered subcentimeter bilateral solid and groundglass nodules, too small to characterize are nonspecific. 2. Fibroid uterus with no associated abnormal FDG activity.. 3/7/22-s/p Right VATS robotic-assisted superior segmentectomy with lymph node dissection. 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 Stage 1A1 (T1miN0).   1/9/2023 CT chest-resolution of trace right pleural effusion and decreased density surrounding the right lower lobe staple line. Stable groundglass and solid lung nodules. No new or enlarging lung nodule. Emphysema. 7/2/2023-CT chest-No pulmonary embolism. Stable exam status post right lower lobe wedge resection. 1/5/2024-CT chest-New cluster of very small left lower lobe nodules, likely mucus impacted bronchioles. Unchanged other small lung nodules. --clinically stable currently; expectant surveillance. --next CT chest 1/2025 per thoracic surgery  #2) hyponatremia-chronic, intermittent-f/u PCP  Patient was given the opportunity to ask questions. Her many questions have been answered to her apparent satisfaction at this time.  -->RTO 6 months.

## 2024-05-12 ENCOUNTER — NON-APPOINTMENT (OUTPATIENT)
Age: 74
End: 2024-05-12

## 2024-05-13 ENCOUNTER — RESULT REVIEW (OUTPATIENT)
Age: 74
End: 2024-05-13

## 2024-05-13 ENCOUNTER — APPOINTMENT (OUTPATIENT)
Dept: RADIOLOGY | Facility: HOSPITAL | Age: 74
End: 2024-05-13
Payer: MEDICARE

## 2024-05-13 ENCOUNTER — APPOINTMENT (OUTPATIENT)
Dept: ULTRASOUND IMAGING | Facility: HOSPITAL | Age: 74
End: 2024-05-13
Payer: MEDICARE

## 2024-05-13 ENCOUNTER — APPOINTMENT (OUTPATIENT)
Dept: MAMMOGRAPHY | Facility: HOSPITAL | Age: 74
End: 2024-05-13
Payer: MEDICARE

## 2024-05-13 ENCOUNTER — OUTPATIENT (OUTPATIENT)
Dept: OUTPATIENT SERVICES | Facility: HOSPITAL | Age: 74
LOS: 1 days | End: 2024-05-13
Payer: MEDICARE

## 2024-05-13 DIAGNOSIS — Z98.890 OTHER SPECIFIED POSTPROCEDURAL STATES: Chronic | ICD-10-CM

## 2024-05-13 DIAGNOSIS — R92.30 DENSE BREASTS, UNSPECIFIED: ICD-10-CM

## 2024-05-13 DIAGNOSIS — Z12.39 ENCOUNTER FOR OTHER SCREENING FOR MALIGNANT NEOPLASM OF BREAST: ICD-10-CM

## 2024-05-13 DIAGNOSIS — M81.0 AGE-RELATED OSTEOPOROSIS W/OUT CURRENT PATHOLOGICAL FRACTURE: ICD-10-CM

## 2024-05-13 DIAGNOSIS — C43.9 MALIGNANT MELANOMA OF SKIN, UNSPECIFIED: Chronic | ICD-10-CM

## 2024-05-13 PROCEDURE — 77067 SCR MAMMO BI INCL CAD: CPT

## 2024-05-13 PROCEDURE — 77067 SCR MAMMO BI INCL CAD: CPT | Mod: 26

## 2024-05-13 PROCEDURE — 77080 DXA BONE DENSITY AXIAL: CPT

## 2024-05-13 PROCEDURE — 76641 ULTRASOUND BREAST COMPLETE: CPT | Mod: 26,50

## 2024-05-13 PROCEDURE — 77063 BREAST TOMOSYNTHESIS BI: CPT | Mod: 26

## 2024-05-13 PROCEDURE — 76641 ULTRASOUND BREAST COMPLETE: CPT

## 2024-05-13 PROCEDURE — 77080 DXA BONE DENSITY AXIAL: CPT | Mod: 26

## 2024-05-13 PROCEDURE — 77063 BREAST TOMOSYNTHESIS BI: CPT

## 2024-05-14 ENCOUNTER — NON-APPOINTMENT (OUTPATIENT)
Age: 74
End: 2024-05-14

## 2024-05-14 ENCOUNTER — APPOINTMENT (OUTPATIENT)
Dept: CARDIOLOGY | Facility: CLINIC | Age: 74
End: 2024-05-14
Payer: MEDICARE

## 2024-05-14 VITALS
SYSTOLIC BLOOD PRESSURE: 127 MMHG | HEART RATE: 69 BPM | DIASTOLIC BLOOD PRESSURE: 72 MMHG | HEIGHT: 63 IN | RESPIRATION RATE: 16 BRPM | BODY MASS INDEX: 20.38 KG/M2 | OXYGEN SATURATION: 99 % | WEIGHT: 115 LBS

## 2024-05-14 PROCEDURE — 99214 OFFICE O/P EST MOD 30 MIN: CPT

## 2024-05-14 PROCEDURE — 93000 ELECTROCARDIOGRAM COMPLETE: CPT

## 2024-05-14 PROCEDURE — G2211 COMPLEX E/M VISIT ADD ON: CPT

## 2024-05-14 NOTE — HISTORY OF PRESENT ILLNESS
[FreeTextEntry1] : Shala tells me that surgery is planned on 1/25. A CAT scan is plan one week prior. If the lesion is stable, then surgery may be postponed only to rescanned in 3 to 4 months. She notes cough however covid 19 testing was negative. she feels an occasional odd sensation under the rib cage over the past two years which is unexplained when she takes a deep breath. She  comes today for clarification of   Her   overall cardiovascular risk. she was advised to undergo a complete cardiac evaluation. She denies current chest pains shortness of breath or loss of consciousness.  Update 3/1/2023 Shala was recently noted to have a borderline elevated D-dimer at 296 she is to undergo pulmonary function testing she underwent a CAT scan she is following with Dr. Escobar radiology read report of emphysema she does have shortness of breath with exertion she suffers from fatigue and shortness of breath she is postoperative

## 2024-05-19 NOTE — REASON FOR VISIT
[Symptom and Test Evaluation] : symptom and test evaluation [Abnormal Test Result] : an abnormal test result [FreeTextEntry3] : dr winkler [FreeTextEntry1] : Shala tells me that surgery is planned on 1/25. A CAT scan is plan one week prior. If the lesion is stable, then surgery may be postponed only to rescanned in 3 to 4 months. She notes cough however covid 19 testing was negative. she feels an occasional odd sensation under the rib cage over the past two years which is unexplained when she takes a deep breath. She  comes today for clarification of   Her   overall cardiovascular risk. she was advised to undergo a complete cardiac evaluation. She denies current chest pains shortness of breath or loss of consciousness.  Update 3/1/2023 Shala was recently noted to have a borderline elevated D-dimer at 296 she is to undergo pulmonary function testing she underwent a CAT scan she is following with Dr. Escobar radiology read report of emphysema she does have shortness of breath with exertion she suffers from fatigue and shortness of breath she is postoperative. Shala tells me that surgery is planned on 125 she is saying [place] a CAT scan is planned one week before Deondre feels that if the lesion is stable and surgery may be postponed in the rescan planned in 3 to 4 months Shala notes a cough covid was negative she feels occasional odd sensation under the rib cage over the past two years which is unexplained when she takes a deep breath  Update 6/10/2022 Shala was recently seen in hospital with some shortness of breath the D-dimer was borderline elevated and a CTA to rule out pulmonary embolism was negative she inquires about some pleuritic -like pains which most likely represent postoperative inflammation  Update 7/21/2023 Shala tells me that she feels winded dizzy lightheaded she feels a pressure in her chest she underwent an evaluation in emergency room the D-dimer was 209 troponin 7.4/5.6 which is low a PET scan is planned. she was felt to have a stage I lung cancer which was caught early   4/12/24 Shala felt winded since her lung surgery. She apparently walked into a door and suffered some left rib. A fracture was excluded with a negative x-ray. after the imaging procedure she apparently walked into a door she's had some blurred vision and hit her nose. ldl 79 bs 113 hb 12.1. we note a diagnosis of "melanoma" on path and a recent ct scan neg pericardial effusion.   5/14/24 Shala had an episode where she closed the door leaving X-Ray Department, she apparently saw some blurring in her vision. the event recorder does not support a fib.  her brother was concerned about some results including mitral and aortic calcification. she has this pressure bloating feeling which she feels is gastric which may lead to shortness of breath and is seeing Dr. Perkins

## 2024-05-19 NOTE — PHYSICAL EXAM
[General Appearance - Well Developed] : well developed [Normal Appearance] : normal appearance [Well Groomed] : well groomed [General Appearance - Well Nourished] : well nourished [No Deformities] : no deformities [General Appearance - In No Acute Distress] : no acute distress [Normal Conjunctiva] : the conjunctiva exhibited no abnormalities [Eyelids - No Xanthelasma] : the eyelids demonstrated no xanthelasmas [Normal Oral Mucosa] : normal oral mucosa [No Oral Pallor] : no oral pallor [No Oral Cyanosis] : no oral cyanosis [Normal Jugular Venous A Waves Present] : normal jugular venous A waves present [Normal Jugular Venous V Waves Present] : normal jugular venous V waves present [No Jugular Venous Alonzo A Waves] : no jugular venous alonzo A waves [Respiration, Rhythm And Depth] : normal respiratory rhythm and effort [Exaggerated Use Of Accessory Muscles For Inspiration] : no accessory muscle use [Auscultation Breath Sounds / Voice Sounds] : lungs were clear to auscultation bilaterally [Heart Rate And Rhythm] : heart rate and rhythm were normal [Heart Sounds] : normal S1 and S2 [Murmurs] : no murmurs present [Abdomen Soft] : soft [Abdomen Tenderness] : non-tender [Abdomen Mass (___ Cm)] : no abdominal mass palpated [Gait - Sufficient For Exercise Testing] : the gait was sufficient for exercise testing [Abnormal Walk] : normal gait [Nail Clubbing] : no clubbing of the fingernails [Cyanosis, Localized] : no localized cyanosis [Petechial Hemorrhages (___cm)] : no petechial hemorrhages [Skin Color & Pigmentation] : normal skin color and pigmentation [] : no rash [No Venous Stasis] : no venous stasis [Skin Lesions] : no skin lesions [No Skin Ulcers] : no skin ulcer [No Xanthoma] : no  xanthoma was observed [Oriented To Time, Place, And Person] : oriented to person, place, and time [Affect] : the affect was normal [Mood] : the mood was normal [FreeTextEntry1] : mild appropriate anxiety

## 2024-05-19 NOTE — ASSESSMENT
[FreeTextEntry1] : I have asked   Shala   to undergo detailed cardiac testing in order to evaluate her overall cardiovascular risk. An assessment of both structural and functional heart disease was recommended to the patient. In this regard, a stress test in cardiac MRI were advised to the patient. we are concerned about melanoma and metastatic spread to the pericardium. The stresses has returned satisfactory although the achieved heart rate was non-diagnostic. Without evidence or recent infarction overt heart failure or unstable angina and based upon a satisfactory stress test Ms. Hale may undergo planned evaluation of the lung nodule which constitutes hi risk surgery in a patient with intermediate cardiac risk at an ASA class II or III anesthesia risk  Addendum 6/10/2022 The D-dimer was borderline given Shala's age and in fact a CTA was negative for pulmonary embolism we will continue to treat empirically with nonsteroidals and anti-inflammatories for some pleuritic -like pains follow-up with Dr. Hendrix for further oncology evaluations in the second setting of a carcinoma which has been resected  Addendum 3/1/2023 Given complaints of dyspnea on exertion and a family history of coronary disease I have asked Shala to undergo a cardiac CTA in order to exclude underlying coronary disease she has never been a smoker and the finding of emphysema is unclear previous noninvasive ischemic work-up has been unrevealing and an imaging procedure would be helpful here such as a cardiac CTA.  Blood work requested including brain naturetic peptide  Addendum 7/14/2023 Multiple complaints of unclear etiology clearly Lyme to be excluded requesting blood work from Dr. Tamez underwent testing for babesiosis  4/12/24 mutliplle somatic complaints inculding rib pains and shortness of breath. we are concerned for TIA and stroke although presentation is atypical . would obtian event monitor to exclude paroxysmal afib.   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 Shala is currently against any surgical approaches if at all possible. Shala is referred to Dr. Le or Luz for further evaluation of a pericardial cyst? anterior mediastinal mass.  copies of results given to patient for review  medical necessity this is a high-risk encounter based upon the need to assess multiple somatic complaints of unknown clear etiology and review of multiple imaging studies including echocardiogram MRI CT scan and event recorder and subspecialty referral

## 2024-05-21 ENCOUNTER — APPOINTMENT (OUTPATIENT)
Dept: CARDIOTHORACIC SURGERY | Facility: CLINIC | Age: 74
End: 2024-05-21
Payer: MEDICARE

## 2024-05-21 VITALS — HEIGHT: 63 IN | WEIGHT: 115 LBS | BODY MASS INDEX: 20.38 KG/M2

## 2024-05-21 DIAGNOSIS — Q24.8 OTHER SPECIFIED CONGENITAL MALFORMATIONS OF HEART: ICD-10-CM

## 2024-05-21 DIAGNOSIS — R91.1 SOLITARY PULMONARY NODULE: ICD-10-CM

## 2024-05-21 PROCEDURE — 99214 OFFICE O/P EST MOD 30 MIN: CPT

## 2024-05-21 NOTE — DATA REVIEWED
[FreeTextEntry1] : CT CHEST    PROCEDURE DATE:  01/05/2024 COMPARISON: 7/2/2023 CT chest. LUNGS/AIRWAYS/PLEURA: Patent airways through the segmental bronchi. Unchanged right lower sub lobar resection scarring. New left lower lobe small cluster of sub-3 mm nodules (3-132). Unchanged multiple other 1-2 mm solid nodules in both lungs, and faint right upper lobe 6 mm groundglass nodule (3-46). No pleural effusion. LYMPH NODES/MEDIASTINUM: No lymphadenopathy. Unchanged anterior mediastinal cyst. HEART/VASCULATURE: Normal sized heart and aorta. No pericardial effusion. UPPER ABDOMEN: Small calcifications in the spleen and right kidney. BONES/SOFT TISSUES: Unchanged deformity of the left sixth rib and sclerotic foci in the spine which may represent bone islands. IMPRESSION:  New cluster of very small left lower lobe nodules, likely mucus impacted bronchioles. Unchanged other small lung nodules.  CT chest No contrast PROCEDURE DATE:  06/20/2022.  COMPARISON: CT chest 5/13/2022. LUNGS/AIRWAYS/PLEURA: No endo bronchial lesion. Right upper lobe tracheal bronchus. Resolved ground glass component and overall decreased size of right lower lobe consolidative opacity adjacent to right lower lobe staple line. Stable 0.7 cm right upper lobe ground glass nodule (4-47) and few other sub-6 mm mm nodules in both lungs, for example, the left lower lobe (4-124) and left upper lobe (4-135). Stable small right pleural effusion and mild right pleural thickening. LYMPH NODES/MEDIASTINUM: Decreased mediastinal cyst. No enlarged lymph nodes. HEART/VASCULATURE: Normal heart size. Trace pericardial fluid. Normal caliber aorta and pulmonary artery. UPPER ABDOMEN: Calcified granuloma in the spleen. Non obstructing right nephrolithiasis. BONES/SOFT TISSUES: No acute or suspicious abnormality. IMPRESSION: Further decrease in right lower lobe opacity adjacent to the staple line. Stable few lung nodules, largest a 0.7 cm ground glass nodule in the right upper lobe.

## 2024-05-21 NOTE — REVIEW OF SYSTEMS
[Feeling Tired] : feeling tired [Chest Pain] : no chest pain [Palpitations] : no palpitations [Lower Ext Edema] : no lower extremity edema [SOB on Exertion] : no shortness of breath during exertion [Constipation] : no constipation [Dizziness] : no dizziness [Fainting] : no fainting [Anxiety] : no anxiety [Easy Bleeding] : no tendency for easy bleeding

## 2024-05-21 NOTE — PHYSICAL EXAM
[General Appearance - Alert] : alert [Jugular Venous Distention Increased] : there was no jugular-venous distention [Respiration, Rhythm And Depth] : normal respiratory rhythm and effort [Auscultation Breath Sounds / Voice Sounds] : lungs were clear to auscultation bilaterally [Heart Rate And Rhythm] : heart rate was normal and rhythm regular [Examination Of The Chest] : the chest was normal in appearance [Bowel Sounds] : normal bowel sounds [Abdomen Soft] : soft [Involuntary Movements] : no involuntary movements were seen [Skin Color & Pigmentation] : normal skin color and pigmentation [No Focal Deficits] : no focal deficits [Oriented To Time, Place, And Person] : oriented to person, place, and time [Right Carotid Bruit] : no bruit heard over the right carotid [Left Carotid Bruit] : no bruit heard over the left carotid

## 2024-05-21 NOTE — HISTORY OF PRESENT ILLNESS
[FreeTextEntry1] : Shala is a 74-year-old female who was referred for surgical consultation by Dr. Rubi for epicardial cyst. She reports ongoing abdominal discomfort and bloating. She denies chest pressure, palpitations, PND, orthopnea or syncope.   CT CHEST    PROCEDURE DATE:  01/05/2024 COMPARISON: 7/2/2023 CT chest. LUNGS/AIRWAYS/PLEURA: Patent airways through the segmental bronchi. Unchanged right lower sub lobar resection scarring. New left lower lobe small cluster of sub-3 mm nodules (3-132). Unchanged multiple other 1-2 mm solid nodules in both lungs, and faint right upper lobe 6 mm ground glass nodule (3-46). No pleural effusion. LYMPH NODES/MEDIASTINUM: No lymphadenopathy. Unchanged anterior mediastinal cyst.  HEART/VASCULATURE: Normal sized heart and aorta. No pericardial effusion. UPPER ABDOMEN: Small calcifications in the spleen and right kidney. BONES/SOFT TISSUES: Unchanged deformity of the left sixth rib and sclerotic foci in the spine which may represent bone islands. IMPRESSION:  New cluster of very small left lower lobe nodules, likely mucus impacted bronchioles.  CTA heart 3/27/2023 Pericardium: In the right anterior mediastinum, there is a 4.4 x 2.2 cm T1 hypointense and T2 hyperintense homogeneous, unilocular cystic collection that abuts the right anterolateral wall of the ascending aorta and is contiguous with the pericardium, most consistent with a pericardial cyst versus diverticulum. There is no associated enhancement on postcontrast imaging. This corresponds to the finding seen on the November 2021 chest CT and PET/CT

## 2024-05-21 NOTE — ASSESSMENT
[FreeTextEntry1] : Shala is a 74-year-old female who was referred for surgical consultation by Dr. Rubi for epicardial cyst. Patient has history of Stage IA malignant melanoma. s/p wide excision of flank on 2/15/21, p/w neck pain s/p CT Neck which revealed RLL groundglass opacity. 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 Further CT Chest reveals other solid and groundglass pulmonary nodules.  On 3/7/22-s/p Right VATS robotic-assisted superior segmentectomy with lymph node dissection on 3/7/22. 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  She follows with Dr. Sue Hendrix  CT CHEST    PROCEDURE DATE:  01/05/2024 COMPARISON: 7/2/2023 CT chest. LUNGS/AIRWAYS/PLEURA: Patent airways through the segmental bronchi. Unchanged right lower sub lobar resection scarring. New left lower lobe small cluster of sub-3 mm nodules (3-132). Unchanged multiple other 1-2 mm solid nodules in both lungs, and faint right upper lobe 6 mm ground glass nodule (3-46). No pleural effusion. LYMPH NODES/MEDIASTINUM: No lymphadenopathy. Unchanged anterior mediastinal cyst.  HEART/VASCULATURE: Normal sized heart and aorta. No pericardial effusion. UPPER ABDOMEN: Small calcifications in the spleen and right kidney. BONES/SOFT TISSUES: Unchanged deformity of the left sixth rib and sclerotic foci in the spine which may represent bone islands. IMPRESSION:  New cluster of very small left lower lobe nodules, likely mucus impacted bronchioles.  CTA heart 3/27/2023 Pericardium: In the right anterior mediastinum, there is a 4.4 x 2.2 cm T1 hypointense and T2 hyperintense homogeneous, unilocular cystic collection that abuts the right anterolateral wall of the ascending aorta and is contiguous with the pericardium, most consistent with a pericardial cyst versus diverticulum. There is no associated enhancement on postcontrast imaging. This corresponds to the finding seen on the November 2021 chest CT and PET/CT (presented as a region of photo penia on the PET/CT).  I have reviewed the patient's medical records, diagnostic images during the time of this office consultation and have made the following recommendation. Review of the imaging shows fluid filled epicardial cyst that has remained stable and does not require surgical intervention. Return on as needed basis.

## 2024-06-17 ENCOUNTER — NON-APPOINTMENT (OUTPATIENT)
Age: 74
End: 2024-06-17

## 2024-06-18 ENCOUNTER — APPOINTMENT (OUTPATIENT)
Dept: CARDIOLOGY | Facility: CLINIC | Age: 74
End: 2024-06-18
Payer: MEDICARE

## 2024-06-18 ENCOUNTER — NON-APPOINTMENT (OUTPATIENT)
Age: 74
End: 2024-06-18

## 2024-06-18 PROCEDURE — 99442: CPT

## 2024-06-19 ENCOUNTER — RESULT REVIEW (OUTPATIENT)
Age: 74
End: 2024-06-19

## 2024-06-19 ENCOUNTER — APPOINTMENT (OUTPATIENT)
Dept: GASTROENTEROLOGY | Facility: HOSPITAL | Age: 74
End: 2024-06-19

## 2024-06-19 ENCOUNTER — OUTPATIENT (OUTPATIENT)
Dept: OUTPATIENT SERVICES | Facility: HOSPITAL | Age: 74
LOS: 1 days | End: 2024-06-19
Payer: MEDICARE

## 2024-06-19 DIAGNOSIS — R14.0 ABDOMINAL DISTENSION (GASEOUS): ICD-10-CM

## 2024-06-19 DIAGNOSIS — C43.9 MALIGNANT MELANOMA OF SKIN, UNSPECIFIED: Chronic | ICD-10-CM

## 2024-06-19 DIAGNOSIS — Z98.890 OTHER SPECIFIED POSTPROCEDURAL STATES: Chronic | ICD-10-CM

## 2024-06-19 DIAGNOSIS — K31.A0 GASTRIC INTESTINAL METAPLASIA, UNSPECIFIED: ICD-10-CM

## 2024-06-19 DIAGNOSIS — K25.3 ACUTE GASTRIC ULCER WITHOUT HEMORRHAGE OR PERFORATION: ICD-10-CM

## 2024-06-19 DIAGNOSIS — R07.89 OTHER CHEST PAIN: ICD-10-CM

## 2024-06-19 PROCEDURE — 43239 EGD BIOPSY SINGLE/MULTIPLE: CPT

## 2024-06-19 PROCEDURE — 88305 TISSUE EXAM BY PATHOLOGIST: CPT | Mod: 26

## 2024-06-19 PROCEDURE — 88312 SPECIAL STAINS GROUP 1: CPT | Mod: 26

## 2024-06-19 PROCEDURE — 88312 SPECIAL STAINS GROUP 1: CPT

## 2024-06-19 PROCEDURE — 88305 TISSUE EXAM BY PATHOLOGIST: CPT

## 2024-06-19 RX ORDER — SODIUM CHLORIDE 0.9 % (FLUSH) 0.9 %
500 SYRINGE (ML) INJECTION
Refills: 0 | Status: COMPLETED | OUTPATIENT
Start: 2024-06-19 | End: 2024-06-19

## 2024-06-19 RX ADMIN — Medication 75 MILLILITER(S): at 10:28

## 2024-06-20 LAB — SURGICAL PATHOLOGY STUDY: SIGNIFICANT CHANGE UP

## 2024-06-25 ENCOUNTER — NON-APPOINTMENT (OUTPATIENT)
Age: 74
End: 2024-06-25

## 2024-06-26 ENCOUNTER — EMERGENCY (EMERGENCY)
Facility: HOSPITAL | Age: 74
LOS: 1 days | Discharge: HOME | End: 2024-06-26
Attending: EMERGENCY MEDICINE | Admitting: EMERGENCY MEDICINE
Payer: MEDICARE

## 2024-06-26 VITALS
HEIGHT: 64 IN | SYSTOLIC BLOOD PRESSURE: 128 MMHG | TEMPERATURE: 97 F | HEART RATE: 63 BPM | RESPIRATION RATE: 18 BRPM | OXYGEN SATURATION: 99 % | WEIGHT: 111.99 LBS | DIASTOLIC BLOOD PRESSURE: 78 MMHG

## 2024-06-26 VITALS
DIASTOLIC BLOOD PRESSURE: 72 MMHG | OXYGEN SATURATION: 98 % | SYSTOLIC BLOOD PRESSURE: 122 MMHG | HEART RATE: 64 BPM | RESPIRATION RATE: 18 BRPM

## 2024-06-26 DIAGNOSIS — Z98.890 OTHER SPECIFIED POSTPROCEDURAL STATES: Chronic | ICD-10-CM

## 2024-06-26 DIAGNOSIS — C43.9 MALIGNANT MELANOMA OF SKIN, UNSPECIFIED: Chronic | ICD-10-CM

## 2024-06-26 PROCEDURE — 99284 EMERGENCY DEPT VISIT MOD MDM: CPT

## 2024-06-26 PROCEDURE — 99283 EMERGENCY DEPT VISIT LOW MDM: CPT

## 2024-06-26 RX ORDER — METRONIDAZOLE 500 MG
500 TABLET ORAL ONCE
Refills: 0 | Status: COMPLETED | OUTPATIENT
Start: 2024-06-26 | End: 2024-06-26

## 2024-06-26 RX ORDER — METRONIDAZOLE 500 MG
1 TABLET ORAL
Qty: 21 | Refills: 0
Start: 2024-06-26 | End: 2024-07-02

## 2024-06-26 RX ORDER — IBUPROFEN 200 MG
600 TABLET ORAL ONCE
Refills: 0 | Status: COMPLETED | OUTPATIENT
Start: 2024-06-26 | End: 2024-06-26

## 2024-06-26 RX ADMIN — Medication 500 MILLIGRAM(S): at 01:54

## 2024-06-26 RX ADMIN — Medication 1 TABLET(S): at 01:55

## 2024-06-26 RX ADMIN — Medication 600 MILLIGRAM(S): at 01:55

## 2024-06-26 NOTE — ED PROVIDER NOTE - NSDCPRINTRESULTS_ED_ALL_ED
Given report to Daniele ADAMS and transported pt. Accompanied by RN and ER Tech on a monitor    Patient requests all Lab, Cardiology, and Radiology Results on their Discharge Instructions

## 2024-06-26 NOTE — ED PROVIDER NOTE - PHYSICAL EXAMINATION
exam:   General: well appearing, NAD.   HEENT: eyes perrl, normal phonation  cor: RRR, s1s2, 2+rad pulses.   lungs: ctabl, no resp distress.   neuro: a&ox3, no facial asymmetry, HADDAD, nonfocal  Skin: Multiple scratches seen left hand and wrist.  Apparent punctures along proximal phalanx second digit with mild swelling over the area with mild tenderness.  Full range of motion fingers.  Normal distal sensation.  Normal cap refill

## 2024-06-26 NOTE — ED ADULT TRIAGE NOTE - BP NONINVASIVE DIASTOLIC (MM HG)
78 Tetracycline Pregnancy And Lactation Text: This medication is Pregnancy Category D and not consider safe during pregnancy. It is also excreted in breast milk.

## 2024-06-26 NOTE — ED ADULT NURSE NOTE - OBJECTIVE STATEMENT
Pt presents to the ER complaining of a cat bite that happened about an hour and half prior to arrival to the ER. Pt has multiple superficial bite and scratch marks to left upper extremity. Pt states that her cat is up to date on all vaccinations. A&OX4. Pt denies SOB, chest pain, nausea, vomiting, dizziness or headache.

## 2024-06-26 NOTE — ED ADULT TRIAGE NOTE - INTERNATIONAL TRAVEL
RX [place] ([place]) reviewed:  reviewed- no concerns    [place]  [email or web address]    Refill done.  Mercedez Britton MD on 10/15/2022 at 4:07 PM   
No

## 2024-06-26 NOTE — ED PROVIDER NOTE - CARE PROVIDER_API CALL
Ajit Valentino.  Orthopaedic Surgery  [address] [place] [address]  Phone: [phone]  Fax: [phone]  Follow Up Time:

## 2024-06-26 NOTE — ED PROVIDER NOTE - CLINICAL SUMMARY MEDICAL DECISION MAKING FREE TEXT BOX
74-year-old female with history of melanoma, hypothyroidism, cervical cancer, complaining of cat bite and scratches to her left finger and hand about an hour and a half prior to arrival tonight.  Patient states she was breaking up a fight between her 2 cats when one of them bit and scratched her.  Associated with pain and swelling, particularly to the index finger.  No fever or chills.  No weakness numbness.  Patient's cats are vaccinated for rabies and otherwise behaving normally, not ill.    Vitals unremarkable.  Patient in no distress.  Multiple scratches seen left hand and wrist.  Apparent puncture along proximal phalanx second digit with mild swelling over the area with mild tenderness.  Full range of motion fingers.  Normal distal sensation.  Normal cap refill.  Will treat with Bactrim and Flagyl as patient is penicillin allergic.  Motrin for pain and inflammation.  Follow-up with hand specialist.  Return for worsening

## 2024-06-26 NOTE — ED ADULT NURSE NOTE - NSFALLUNIVINTERV_ED_ALL_ED
Bed/Stretcher in lowest position, wheels locked, appropriate side rails in place/Call bell, personal items and telephone in reach/Instruct patient to call for assistance before getting out of bed/chair/stretcher/Non-slip footwear applied when patient is off stretcher/[place] to call system/Physically safe environment - no spills, clutter or unnecessary equipment/Purposeful proactive rounding/Room/bathroom lighting operational, light cord in reach

## 2024-06-26 NOTE — ED PROVIDER NOTE - OBJECTIVE STATEMENT
74-year-old female with history of melanoma, hypothyroidism, cervical cancer, complaining of cat bite and scratches to her left finger and hand about an hour and a half prior to arrival tonight.  Patient states she was breaking up a fight between her 2 cats when one of them bit and scratched her.  Associated with pain and swelling, particularly to the index finger.  No fever or chills.  No weakness numbness.  Patient's cats are vaccinated for rabies and otherwise behaving normally, not ill.

## 2024-06-26 NOTE — ED ADULT NURSE NOTE - NSICDXPASTMEDICALHX_GEN_ALL_CORE_FT
Problem: Pain  Goal: Verbalizes/displays adequate comfort level or baseline comfort level  Outcome: Progressing   Denies. Intermittent HA controlled with tylenol  Problem: Safety - Adult  Goal: Free from fall injury  Outcome: Progressing   Pt free from injury this shift and free of falls. 2/4 rails up on bed and bed is in the lowest position.Wheels locked and bed alarm set. Socks on pt and ID bands on pt. Call light in reach of pt and pt educated to call out to get up. Will continue to monitor for safety.  X1 walker  Problem: ABCDS Injury Assessment  Goal: Absence of physical injury  Outcome: Progressing   Pt free from injury this shift and free of falls. 2/4 rails up on bed and bed is in the lowest position.Wheels locked and bed alarm set. Socks on pt and ID bands on pt. Call light in reach of pt and pt educated to call out to get up. Will continue to monitor for safety.     PAST MEDICAL HISTORY:  Anxiety     Cervical ca     Chronic fatigue syndrome     Depression     Hypothyroid     Lyme disease     Malignant melanoma     Mononucleosis     Pulmonary nodule     Uterine fibroid     Vertigo

## 2024-06-26 NOTE — ED PROVIDER NOTE - PATIENT PORTAL LINK FT
You can access the FollowMyHealth Patient Portal offered by [facility] by registering at the following website: [email or web address]. By joining [facility]’s FollowMyHealth portal, you will also be able to view your health information using other applications (apps) compatible with our system.
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
## 2024-06-26 NOTE — ED ADULT TRIAGE NOTE - CHIEF COMPLAINT QUOTE
Patient arrives with cat bite x1.5hr PTA. Multiple superficial bite/scratch marks noted to LUE, no bleeding. States cat is UTD on vaccinations. Cleaned wound PTA.

## 2024-06-26 NOTE — ED PROVIDER NOTE - NSFOLLOWUPINSTRUCTIONS_ED_ALL_ED_FT
Take Bactrim twice daily.  Take Flagyl 3 times daily  -Take Advil 400 to 600 mg every 6 hours as needed for pain  -Follow-up with hand specialist in the next 2 to 3 days  -Return for worsening pain swelling, pus, fever or other concerns    - take augmentin antibiotic twice daily  - wash wound with soap and water, pat dry and apply bacitracin and clean bandage daily.  - return for worse pain, swelling, redness, pus, fever or other concerns      Animal Bite    WHAT YOU NEED TO KNOW:    Animal bite injuries range from shallow cuts to deep, life-threatening wounds. An animal can cut or puncture the skin when it bites. Your skin may be torn from your body. Your skin may swell or bruise even if the bite does not break the skin. Animal bites occur more often on the hands, arms, legs, and face. Bites from dogs and cats are the most common injuries.    DISCHARGE INSTRUCTIONS:    Return to the emergency department if:   •You have a fever.      •Your wound is red, swollen, and draining pus.      •You see red streaks on the skin around the wound.      •You can no longer move the bitten area.      •Your heartbeat and breathing are much faster than usual.      •You feel dizzy and confused.      Contact your healthcare provider if:   •Your pain does not get better, even after you take pain medicine.      •You have nightmares or flashbacks about the animal bite.       •You have questions or concerns about your condition or care.      Medicines: You may need any of the following:   •Antibiotics prevent or treat a bacterial infection.      •Prescription pain medicine may be given. Ask how to take this medicine safely.      •A tetanus vaccine may be needed to prevent tetanus. Tetanus is a life-threatening bacterial infection that affects the nerves and muscles. The bacteria can be spread through animal bites.       •A rabies vaccine may be needed to prevent rabies. Rabies is a life-threatening viral infection. The virus can be spread through animal bites.      •Take your medicine as directed. Contact your healthcare provider if you think your medicine is not helping or if you have side effects. Tell him or her if you are allergic to any medicine. Keep a list of the medicines, vitamins, and herbs you take. Include the amounts, and when and why you take them. Bring the list or the pill bottles to follow-up visits. Carry your medicine list with you in case of an emergency.      Follow up with your healthcare provider in 1 to 2 days: You may need to return to have your stitches removed. Write down your questions so you remember to ask them during your visits.    Self-care:   •Apply antibiotic ointment as directed. This helps prevent infection in minor skin wounds. It is available without a doctor's order.      •Keep the wound clean and covered. Wash the wound every day with soap and water or germ-killing cleanser. Ask your healthcare provider about the kinds of bandages to use.       •Apply ice on your wound. Ice helps decrease swelling and pain. Ice may also help prevent tissue damage. Use an ice pack, or put crushed ice in a plastic bag. Cover it with a towel and place it on your wound for 15 to 20 minutes every hour or as directed.      •Elevate the wound area. Raise your wound above the level of your heart as often as you can. This will help decrease swelling and pain. Prop your wound on pillows or blankets to keep it elevated comfortably.       Prevent another animal bite:   •Learn to recognize the signs of a scared or angry pet. Avoid quick, sudden movements.      •Do not step between animals that are fighting.      •Do not leave a pet alone with a young child.      •Do not disturb an animal while it eats, sleeps, or cares for its young.      •Do not approach an animal you do not know, especially one that is tied up or caged.      •Stay away from animals that seem sick or act strangely.      •Do not feed or capture wild animals.

## 2024-06-26 NOTE — ED ADULT NURSE NOTE - CAS DISCH CONDITION
General appearance: NAD, conversant, afebrile    Eyes: icteric sclerae, CJ, EOMI   HENT: Atraumatic; oropharynx clear, MMM and no ulcerations, no pharyngeal erythema or exudate   Neck: Trachea midline; Full range of motion, supple   Pulm: CTA bl, normal respiratory effort and no intercostal retractions, normal work of breathing   CV: RRR, No murmurs, rubs, or gallops   Abdomen: Soft, mild ruq tenderness, non-distended; no guarding or rebound   Extremities: No peripheral edema    Skin: Dry, normal temperature, turgor and texture; jaundiced Stable

## 2024-06-26 NOTE — ED ADULT NURSE NOTE - PLAN OF CARE DISCUSSED WITH:
November 1, 2023      Radha Loya  [address]  [facility] [place] [address]        Dear ,    This letter is regarding your recent Pap smear and Human Papillomavirus (HPV) test.    Your results showed a normal Pap smear and HPV positive.     About 80 percent of women have been exposed to HPV throughout their lifetime. There is no medication for the treatment of HPV. Typically, your own immune system gets rid of the virus before it does harm. HPV is spread by direct skin-to-skin contact, including sexual intercourse, oral sex, anal sex, or any other contact involving the genital area (example: hand to genital contact). It is not possible to become infected with HPV by touching an object, such as a toilet seat. Most people who are infected with HPV have no signs or symptoms.    Things that you can do to boost your immune system and help your body get rid of HPV: get plenty of rest, eat a well-balanced diet of healthy foods, exercise regularly, decrease stress and if you are a smoker, stop smoking.    It is recommended that you have your next Pap smear and Human Papillomavirus (HPV) test in 1 year.    If you have additional questions regarding this result, please contact our Registered Nurse, Raquel, at [phone].      Sincerely,    Your [facility] Care Team   Patient

## 2024-06-28 ENCOUNTER — NON-APPOINTMENT (OUTPATIENT)
Age: 74
End: 2024-06-28

## 2024-07-08 ENCOUNTER — APPOINTMENT (OUTPATIENT)
Dept: PULMONOLOGY | Facility: CLINIC | Age: 74
End: 2024-07-08

## 2024-08-31 NOTE — ED PROVIDER NOTE - PHYSICAL EXAMINATION
AAOx3  Heart: s1/s2, RRR  Lung: CTA b/l  Abd: soft, NT/ND, no rebound or guarding, NCVAT  Extremity: no pedal edema or calf pain,  Neuro: patient moving all extremity equally, equal strength and sensation, in all extremity, no facial droop or slurred speech, no focal neuro deficits noted  normal gait, finger to nose and heel to shin test
First Trimester Sonogram

## 2024-10-03 NOTE — ED PROVIDER NOTE - CLINICAL SUMMARY MEDICAL DECISION MAKING FREE TEXT BOX
73-year-old female with no reported significant past medical history presenting to the ED with complaints of right hand pain and swelling redness traveling up to the wrist area after a cat bite along right proximal third digit, preserved range of motion and flexion of fingers, denies any pain with extension or flexion.  Patient reports no tactile fever chills no reported nausea vomiting.  Patient has been taking clindamycin, took 6 doses so far, was recently seen in the ED and received IV clindamycin.  No other reported complaints.    X-ray of right hand personally reviewed, no noted foreign body or underlying fracture, patient with cellulitis of the right hand, preserved range of motion, low suspicion for flexor tenosynovitis as there is no flexor side swelling or erythema, patient with cat bite puncture wounds distant from joint, clindamycin is usually recommended with an additional antibiotic to cover Pasteurella multocida, ordered doxycycline in addition to clindamycin, patient elected for observation overnight for clinical improvement.  Signed out to Dr. Aguilar 3 = A little assistance

## 2024-11-03 ENCOUNTER — OUTPATIENT (OUTPATIENT)
Dept: OUTPATIENT SERVICES | Facility: HOSPITAL | Age: 74
LOS: 1 days | Discharge: HOME | End: 2024-11-03

## 2024-11-03 DIAGNOSIS — Z98.890 OTHER SPECIFIED POSTPROCEDURAL STATES: Chronic | ICD-10-CM

## 2024-11-03 DIAGNOSIS — C43.9 MALIGNANT MELANOMA OF SKIN, UNSPECIFIED: Chronic | ICD-10-CM

## 2024-11-03 DIAGNOSIS — C43.9 MALIGNANT MELANOMA OF SKIN, UNSPECIFIED: ICD-10-CM

## 2024-11-07 ENCOUNTER — APPOINTMENT (OUTPATIENT)
Dept: HEMATOLOGY ONCOLOGY | Facility: CLINIC | Age: 74
End: 2024-11-07
Payer: MEDICARE

## 2024-11-07 VITALS
HEART RATE: 62 BPM | RESPIRATION RATE: 16 BRPM | TEMPERATURE: 98.5 F | SYSTOLIC BLOOD PRESSURE: 145 MMHG | BODY MASS INDEX: 20.5 KG/M2 | DIASTOLIC BLOOD PRESSURE: 76 MMHG | WEIGHT: 115.74 LBS | OXYGEN SATURATION: 98 %

## 2024-11-07 DIAGNOSIS — C34.90 MALIGNANT NEOPLASM OF UNSPECIFIED PART OF UNSPECIFIED BRONCHUS OR LUNG: ICD-10-CM

## 2024-11-07 DIAGNOSIS — C43.9 MALIGNANT MELANOMA OF SKIN, UNSPECIFIED: ICD-10-CM

## 2024-11-07 PROCEDURE — G2211 COMPLEX E/M VISIT ADD ON: CPT

## 2024-11-07 PROCEDURE — 99213 OFFICE O/P EST LOW 20 MIN: CPT

## 2024-11-11 LAB
BASOPHILS # BLD AUTO: 0.03 K/UL
BASOPHILS NFR BLD AUTO: 0.6 %
EOSINOPHIL # BLD AUTO: 0.05 K/UL
EOSINOPHIL NFR BLD AUTO: 1 %
HCT VFR BLD CALC: 35 %
HGB BLD-MCNC: 12 G/DL
IMM GRANULOCYTES NFR BLD AUTO: 0.4 %
LYMPHOCYTES # BLD AUTO: 1.02 K/UL
LYMPHOCYTES NFR BLD AUTO: 19.7 %
MAN DIFF?: NORMAL
MCHC RBC-ENTMCNC: 31.3 PG
MCHC RBC-ENTMCNC: 34.3 G/DL
MCV RBC AUTO: 91.4 FL
MONOCYTES # BLD AUTO: 0.6 K/UL
MONOCYTES NFR BLD AUTO: 11.6 %
NEUTROPHILS # BLD AUTO: 3.46 K/UL
NEUTROPHILS NFR BLD AUTO: 66.7 %
PLATELET # BLD AUTO: 231 K/UL
RBC # BLD: 3.83 M/UL
RBC # FLD: 12.6 %
WBC # FLD AUTO: 5.18 K/UL

## 2024-11-12 LAB
ALBUMIN SERPL ELPH-MCNC: 4.5 G/DL
ALP BLD-CCNC: 93 U/L
ALT SERPL-CCNC: 16 U/L
ANION GAP SERPL CALC-SCNC: 12 MMOL/L
AST SERPL-CCNC: 19 U/L
BILIRUB SERPL-MCNC: 0.2 MG/DL
BUN SERPL-MCNC: 18 MG/DL
CALCIUM SERPL-MCNC: 9.6 MG/DL
CHLORIDE SERPL-SCNC: 99 MMOL/L
CO2 SERPL-SCNC: 23 MMOL/L
CREAT SERPL-MCNC: 0.6 MG/DL
EGFR: 95 ML/MIN/1.73M2
GLUCOSE SERPL-MCNC: 106 MG/DL
POTASSIUM SERPL-SCNC: 4.4 MMOL/L
PROT SERPL-MCNC: 6.9 G/DL
SODIUM SERPL-SCNC: 134 MMOL/L

## 2024-12-02 ENCOUNTER — RX RENEWAL (OUTPATIENT)
Age: 74
End: 2024-12-02

## 2024-12-05 NOTE — ED ADULT TRIAGE NOTE - PAIN RATING/NUMBER SCALE (0-10): REST
"                           [facility]                              Daily Treatment Note          Name: Yong Stallings      Therapy Diagnosis:   Encounter Diagnosis   Name Primary?    Cerebrovascular disease, acute Yes     Physician: Mayito Langston MD    Visit Date: 12/5/2024        Time In: 1245  Time Out: 1330  Total Billable Time: 45 minutes    Precautions: Fall        Subjective     Pt reports: feeling "okay".  Per SLP, pt was emotional regarding a disagreement with his father.  After a lengthy discussion with pt, SLP states that he was willing to continue participation in therapy.       Pain: 0/10        Objective     Yong received therapeutic exercises to develop strength, endurance, ROM, flexibility, posture, and core stabilization for 20 minutes including:      Exercise Sets Reps Comments   BLE supine stretching/ROM 1 10 PT assisted pt with manual LE stretching and gentle PROM.  PT assisted with B knee flexion/extension, B hip flexion, B hip IR/ER, B adductor and HS stretching in preparation for mobility. Continued joint instability noted in the L knee with terminal extension.                   LTR 2 15 PT applied a gait belt around the thighs to bind pt's legs together.  Pt performed LTR 2 x 15 without any assistance required.    Bridging 2 15 Pt performed 15 reps of bridging.  PT Stabilized pt's RLE.                                 Yong participated in dynamic functional therapeutic activities to improve functional performance for 25  minutes, including:       Assist Level Assistive Device Comments    Bed Mobility Lola-modA  Pt mobilized from sitting at the edge of the mat into supine.  Pt was able to cross his feet prior to descending onto the mat. Pt laid toward his L side and controlled his descent into supine.  PT provided Lola for feet to clear the edge of the mat.   Upon returning to sitting, a gait belt was looped around each of pt's feet and pt utilized his LUE to " bring each leg off the edge of the mat.  PT then created a loop with the gait belt and pt pulled himself up into sitting as if using a trapeze of sorts.     Transfer training CGA-modA  Lateral sliding board t/f performed from the WC to the edge of the low mat.  PT assisted pt to position his WC at the edge of the mat and pt was asked to complete the remaining steps. Pt able to place the board under his L thigh. Pt was able to perform the remainder of the transition in a controlled manner.  PT provided CGA at the E for safety.   Upon returning to the , pt had to transition toward his R side.  As a result, increased assistance was required to place the board.  Increased difficulty was experienced with the initiation of the scoot onto the board.  PT had to provide modA on 2 separate occasions.  Once seated on the board, pt completed the remainder of the transition with CGA.    Sit to stand/stand to sit Lola  Pt stood at the railing and performed 11 trials of sit to stand/stand to sit transitions. Pt utilized LUE support on the rail and PT blocked the R knee to prevent buckling.  With proper cueing for positioning of the L foot and increased anterior weight shifting, pt was able to stand with very little assistance from PT.  Good, controlled descent observed during 10 of the 11 reps. While standing, PT provided cueing for midline orientation.  During the final stand, pt attempted to raise his LLE from the floor, but was unable to do so despite PT blocking his R knee.  When the L foot was deweighted, pt would lose his upright posture and begin to flex at the waist.  Instead, pt performed 10 reps of R and L lateral weight shifting prior to returning to sitting.    WC mobility SBA-Lola  Pt was able to grasp the sequencing of utilizing his LLE and LUE simultaneously to propel his WC x 30 ft. Some cueing was required for steering to prevent the WC from veering toward the R.                 Other:             Assessment  "    Pt tolerated session fairly well despite appearing somewhat somber upon the initiation of treatment.  As mentioned above, pt was reportedly emotional following a disagreement with his father.  Once activity was initiated, pt appeared to "perk up" and remained engaged throughout session.  Will plan to see pt for 4 additional treatments and then pt will be D/C from PT services.  PT continues to encourage increased independence and activity within the home setting, but fears that pt remains very sedentary and dependent on others for all aspects of basic mobility.      Yong Is progressing well towards his goals.   Pt prognosis is Fair.     Pt will continue to benefit from skilled outpatient physical therapy to address the deficits listed in the problem list box on initial evaluation, provide pt/family education and to maximize pt's level of independence in the home and community environment.     Pt's spiritual, cultural and educational needs considered and pt agreeable to plan of care and goals.    Anticipated barriers to physical therapy: decreased insight into deficits, length of time since CVA    Goals:     See initial evaluation        Plan     Will plan to continue to see pt for PT services in an effort to maximize pt's independence with basic mobility and reduce caregiver burden.      Cliff Hollis, PT, DPT                         " 0

## 2024-12-10 NOTE — PLAN
Detail Level: Generalized
General Sunscreen Counseling: I recommended a broad spectrum sunscreen with a SPF of 30 or higher.  I explained that SPF 30 sunscreens block approximately 97 percent of the sun's harmful rays.  Sunscreens should be applied at least 15 minutes prior to expected sun exposure and then every 2 hours after that as long as sun exposure continues. If swimming or exercising sunscreen should be reapplied every 45 minutes to an hour after getting wet or sweating.  One ounce, or the equivalent of a shot glass full of sunscreen, is adequate to protect the skin not covered by a bathing suit. I also recommended a lip balm with a sunscreen as well. Sun protective clothing can be used in lieu of sunscreen but must be worn the entire time you are exposed to the sun's rays.
Products Recommended: Zinc sunscreen, Elta MD UV, NANCY Cheek Zinc, Tereza Horowitz Posay, lip balm with SPF
[FreeTextEntry1] : \par \par I spent the time noted on the day of this patient encounter preparing for, providing and documenting the above service. I have  counseled and educated the patient on the differential, workup, disease course, and treatment/management plan. Education was provided to the patient during this encounter. All questions and concerns were answered and addressed in detail.\par \par Cher Mcqueen MD\par

## 2025-01-03 ENCOUNTER — APPOINTMENT (OUTPATIENT)
Dept: GASTROENTEROLOGY | Facility: CLINIC | Age: 75
End: 2025-01-03
Payer: MEDICARE

## 2025-01-03 VITALS
BODY MASS INDEX: 20.38 KG/M2 | DIASTOLIC BLOOD PRESSURE: 73 MMHG | SYSTOLIC BLOOD PRESSURE: 147 MMHG | TEMPERATURE: 97 F | OXYGEN SATURATION: 99 % | HEART RATE: 63 BPM | RESPIRATION RATE: 16 BRPM | WEIGHT: 115 LBS | HEIGHT: 63 IN

## 2025-01-03 DIAGNOSIS — R14.0 ABDOMINAL DISTENSION (GASEOUS): ICD-10-CM

## 2025-01-03 DIAGNOSIS — K25.3 ACUTE GASTRIC ULCER W/OUT HEMORRHAGE OR PERFORATION: ICD-10-CM

## 2025-01-03 PROCEDURE — 99215 OFFICE O/P EST HI 40 MIN: CPT

## 2025-02-12 ENCOUNTER — OUTPATIENT (OUTPATIENT)
Dept: OUTPATIENT SERVICES | Facility: HOSPITAL | Age: 75
LOS: 1 days | End: 2025-02-12
Payer: MEDICARE

## 2025-02-12 ENCOUNTER — APPOINTMENT (OUTPATIENT)
Dept: CT IMAGING | Facility: HOSPITAL | Age: 75
End: 2025-02-12
Payer: MEDICARE

## 2025-02-12 DIAGNOSIS — Z98.890 OTHER SPECIFIED POSTPROCEDURAL STATES: Chronic | ICD-10-CM

## 2025-02-12 DIAGNOSIS — C43.9 MALIGNANT MELANOMA OF SKIN, UNSPECIFIED: Chronic | ICD-10-CM

## 2025-02-12 DIAGNOSIS — C34.90 MALIGNANT NEOPLASM OF UNSPECIFIED PART OF UNSPECIFIED BRONCHUS OR LUNG: ICD-10-CM

## 2025-02-12 PROCEDURE — 71250 CT THORAX DX C-: CPT

## 2025-02-12 PROCEDURE — 71250 CT THORAX DX C-: CPT | Mod: 26

## 2025-02-26 ENCOUNTER — APPOINTMENT (OUTPATIENT)
Dept: THORACIC SURGERY | Facility: CLINIC | Age: 75
End: 2025-02-26
Payer: MEDICARE

## 2025-02-26 DIAGNOSIS — C34.90 MALIGNANT NEOPLASM OF UNSPECIFIED PART OF UNSPECIFIED BRONCHUS OR LUNG: ICD-10-CM

## 2025-02-26 PROCEDURE — 99213 OFFICE O/P EST LOW 20 MIN: CPT | Mod: 93

## 2025-02-28 NOTE — ED PROVIDER NOTE - DISPOSITION TYPE
Case initiated and sent to .   
Dr. Pro reviewed. Will plan on scheduling EGD and colon at [place]. Orders placed.  
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 Please assist patient in scheduling with GI per STAT order.  Routing message to established GI provider's team.    
Surgery scheduler contacted caregiver.  She is requesting that patient be admitted to the hospital for his colonoscopy prep. She said they will not be able to do get him to drink the prep or, do the clear liquid diet due to his behaviors and cognitive disability .   Tessa was also upset and stated that this was supposed to be placed as an urgent order that needed to be done with in the next few weeks.     Caregiver to be informed that [facility] does not perform inpatient preps, and that patient's chart will be routed to [facility] providers to see if he can be accommodated there. In regards to urgency, his case was handled as STAT, but this was not a guarantee of scheduling procedures.   
OBSERVATION

## 2025-03-01 ENCOUNTER — EMERGENCY (EMERGENCY)
Facility: HOSPITAL | Age: 75
LOS: 1 days | Discharge: HOME | End: 2025-03-01
Attending: INTERNAL MEDICINE | Admitting: INTERNAL MEDICINE
Payer: MEDICARE

## 2025-03-01 VITALS
OXYGEN SATURATION: 96 % | DIASTOLIC BLOOD PRESSURE: 75 MMHG | WEIGHT: 113.1 LBS | RESPIRATION RATE: 17 BRPM | HEIGHT: 64 IN | TEMPERATURE: 98 F | HEART RATE: 77 BPM | SYSTOLIC BLOOD PRESSURE: 165 MMHG

## 2025-03-01 DIAGNOSIS — C43.9 MALIGNANT MELANOMA OF SKIN, UNSPECIFIED: Chronic | ICD-10-CM

## 2025-03-01 DIAGNOSIS — Z98.890 OTHER SPECIFIED POSTPROCEDURAL STATES: Chronic | ICD-10-CM

## 2025-03-01 LAB
ALBUMIN SERPL ELPH-MCNC: 3.7 G/DL — SIGNIFICANT CHANGE UP (ref 3.3–5)
ALP SERPL-CCNC: 99 U/L — SIGNIFICANT CHANGE UP (ref 40–120)
ALT FLD-CCNC: 32 U/L — SIGNIFICANT CHANGE UP (ref 10–45)
ANION GAP SERPL CALC-SCNC: 6 MMOL/L — SIGNIFICANT CHANGE UP (ref 5–17)
AST SERPL-CCNC: 23 U/L — SIGNIFICANT CHANGE UP (ref 10–40)
BASOPHILS # BLD AUTO: 0.02 K/UL — SIGNIFICANT CHANGE UP (ref 0–0.2)
BASOPHILS NFR BLD AUTO: 0.5 % — SIGNIFICANT CHANGE UP (ref 0–2)
BILIRUB SERPL-MCNC: 0.4 MG/DL — SIGNIFICANT CHANGE UP (ref 0.2–1.2)
BUN SERPL-MCNC: 24 MG/DL — HIGH (ref 7–23)
CALCIUM SERPL-MCNC: 9.1 MG/DL — SIGNIFICANT CHANGE UP (ref 8.4–10.5)
CHLORIDE SERPL-SCNC: 100 MMOL/L — SIGNIFICANT CHANGE UP (ref 96–108)
CO2 SERPL-SCNC: 29 MMOL/L — SIGNIFICANT CHANGE UP (ref 22–31)
CREAT SERPL-MCNC: 0.51 MG/DL — SIGNIFICANT CHANGE UP (ref 0.5–1.3)
EGFR: 99 ML/MIN/1.73M2 — SIGNIFICANT CHANGE UP
EOSINOPHIL # BLD AUTO: 0.05 K/UL — SIGNIFICANT CHANGE UP (ref 0–0.5)
EOSINOPHIL NFR BLD AUTO: 1.2 % — SIGNIFICANT CHANGE UP (ref 0–6)
GLUCOSE SERPL-MCNC: 102 MG/DL — HIGH (ref 70–99)
HCT VFR BLD CALC: 35.5 % — SIGNIFICANT CHANGE UP (ref 34.5–45)
HGB BLD-MCNC: 12.2 G/DL — SIGNIFICANT CHANGE UP (ref 11.5–15.5)
IMM GRANULOCYTES NFR BLD AUTO: 0.2 % — SIGNIFICANT CHANGE UP (ref 0–0.9)
LYMPHOCYTES # BLD AUTO: 0.65 K/UL — LOW (ref 1–3.3)
LYMPHOCYTES # BLD AUTO: 15.1 % — SIGNIFICANT CHANGE UP (ref 13–44)
MCHC RBC-ENTMCNC: 31.1 PG — SIGNIFICANT CHANGE UP (ref 27–34)
MCHC RBC-ENTMCNC: 34.4 G/DL — SIGNIFICANT CHANGE UP (ref 32–36)
MCV RBC AUTO: 90.6 FL — SIGNIFICANT CHANGE UP (ref 80–100)
MONOCYTES # BLD AUTO: 0.44 K/UL — SIGNIFICANT CHANGE UP (ref 0–0.9)
MONOCYTES NFR BLD AUTO: 10.2 % — SIGNIFICANT CHANGE UP (ref 2–14)
NEUTROPHILS # BLD AUTO: 3.14 K/UL — SIGNIFICANT CHANGE UP (ref 1.8–7.4)
NEUTROPHILS NFR BLD AUTO: 72.8 % — SIGNIFICANT CHANGE UP (ref 43–77)
NRBC BLD AUTO-RTO: 0 /100 WBCS — SIGNIFICANT CHANGE UP (ref 0–0)
PLATELET # BLD AUTO: 239 K/UL — SIGNIFICANT CHANGE UP (ref 150–400)
POTASSIUM SERPL-MCNC: 4.2 MMOL/L — SIGNIFICANT CHANGE UP (ref 3.5–5.3)
POTASSIUM SERPL-SCNC: 4.2 MMOL/L — SIGNIFICANT CHANGE UP (ref 3.5–5.3)
PROT SERPL-MCNC: 7 G/DL — SIGNIFICANT CHANGE UP (ref 6–8.3)
RBC # BLD: 3.92 M/UL — SIGNIFICANT CHANGE UP (ref 3.8–5.2)
RBC # FLD: 12.2 % — SIGNIFICANT CHANGE UP (ref 10.3–14.5)
SODIUM SERPL-SCNC: 135 MMOL/L — SIGNIFICANT CHANGE UP (ref 135–145)
WBC # BLD: 4.31 K/UL — SIGNIFICANT CHANGE UP (ref 3.8–10.5)
WBC # FLD AUTO: 4.31 K/UL — SIGNIFICANT CHANGE UP (ref 3.8–10.5)

## 2025-03-01 PROCEDURE — 36415 COLL VENOUS BLD VENIPUNCTURE: CPT

## 2025-03-01 PROCEDURE — 80053 COMPREHEN METABOLIC PANEL: CPT

## 2025-03-01 PROCEDURE — 99284 EMERGENCY DEPT VISIT MOD MDM: CPT

## 2025-03-01 PROCEDURE — 99284 EMERGENCY DEPT VISIT MOD MDM: CPT | Mod: 25

## 2025-03-01 PROCEDURE — 96375 TX/PRO/DX INJ NEW DRUG ADDON: CPT

## 2025-03-01 PROCEDURE — 96374 THER/PROPH/DIAG INJ IV PUSH: CPT

## 2025-03-01 PROCEDURE — 85025 COMPLETE CBC W/AUTO DIFF WBC: CPT

## 2025-03-01 RX ORDER — ACETAMINOPHEN 500 MG/5ML
1 LIQUID (ML) ORAL
Qty: 30 | Refills: 0
Start: 2025-03-01 | End: 2025-03-10

## 2025-03-01 RX ORDER — METRONIDAZOLE 250 MG
500 TABLET ORAL ONCE
Refills: 0 | Status: COMPLETED | OUTPATIENT
Start: 2025-03-01 | End: 2025-03-01

## 2025-03-01 RX ORDER — METRONIDAZOLE 250 MG
1 TABLET ORAL
Qty: 30 | Refills: 0
Start: 2025-03-01 | End: 2025-03-10

## 2025-03-01 RX ORDER — ACETAMINOPHEN 500 MG/5ML
1000 LIQUID (ML) ORAL ONCE
Refills: 0 | Status: COMPLETED | OUTPATIENT
Start: 2025-03-01 | End: 2025-03-01

## 2025-03-01 RX ORDER — MUPIROCIN CALCIUM 20 MG/G
1 CREAM TOPICAL
Qty: 1 | Refills: 0
Start: 2025-03-01 | End: 2025-03-10

## 2025-03-01 RX ORDER — LEVOFLOXACIN 25 MG/ML
1 SOLUTION ORAL
Qty: 10 | Refills: 0
Start: 2025-03-01 | End: 2025-03-10

## 2025-03-01 RX ADMIN — Medication 100 MILLIGRAM(S): at 12:34

## 2025-03-01 RX ADMIN — Medication 400 MILLIGRAM(S): at 11:16

## 2025-03-01 RX ADMIN — Medication 1000 MILLILITER(S): at 12:00

## 2025-03-01 NOTE — ED PROVIDER NOTE - CLINICAL SUMMARY MEDICAL DECISION MAKING FREE TEXT BOX
74-year-old female came to the emergency room chief complaint of scratched by her own cat on the left hand happened 9 AM today patient wash the hands with soap and water and came to the emergency room patient has severe penicillin allergies in the past patient has been bitten by the cat and has got infected and she has been hospitalized Patient complains of pain is no redness in the hand  Patient's cat is fully vaccinated it is a house cat  Patient is treated with IV Levaquin and IV Flagyl plan to discharge the patient with the p.o. Levaquin and Flagyl 74-year-old female came to the emergency room chief complaint of scratched by her own cat on the left hand happened 9 AM today patient wash the hands with soap and water and came to the emergency room patient has severe penicillin allergies in the past patient has been bitten by the cat and has got infected and she has been hospitalized Patient complains of pain is no redness in the hand  Patient's cat is fully vaccinated it is a house cat  Patient is treated with IV Levaquin and IV Flagyl plan to discharge the patient with the p.o. Levaquin and Flagyl  Last tetanus shot was in 2018

## 2025-03-01 NOTE — ED PROVIDER NOTE - NSICDXPASTMEDICALHX_GEN_ALL_CORE_FT
PAST MEDICAL HISTORY:  Anxiety     Cervical ca     Chronic fatigue syndrome     Depression     Hypothyroid     Lung cancer     Lyme disease     Malignant melanoma     Mononucleosis     Pulmonary nodule     Uterine fibroid     Vertigo

## 2025-03-01 NOTE — ED PROVIDER NOTE - PATIENT PORTAL LINK FT
You can access the FollowMyHealth Patient Portal offered by [facility] by registering at the following website: [email or web address]. By joining [facility]’s FollowMyHealth portal, you will also be able to view your health information using other applications (apps) compatible with our system.

## 2025-03-01 NOTE — ED PROVIDER NOTE - PHYSICAL EXAMINATION
General:     NAD, well-nourished, well-appearing  Head:     NC/AT, EOMI, oral mucosa moist  Neck:     trachea midline  Lungs:     CTA b/l, no w/r/r  CVS:     S1S2, RRR, no m/g/r  Abd:     +BS, s/nt/nd, no organomegaly  Ext:    2+ radial and pedal pulses, no c/c/e  Neuro: AAOx3, no sensory/motor deficits  Derm–multiple scratch marks on the left hand  and in the wrist area mild erythema mild tenderness no discharge full range of motion of the left hand good handgrip on the left hand cap refill less than 2 seconds sensory motor intact

## 2025-03-01 NOTE — ED PROVIDER NOTE - NSFOLLOWUPINSTRUCTIONS_ED_ALL_ED_FT
Follow up with your PMD within 1-2 days.  Rest, increase your fluids, advance your activity as tolerated.   Take all of your other medications as previously prescribed.   Worsening, continued or ANY new concerning symptoms return to the emergency department.  Bactroban twice a day Flagyl 3 times a day and Levaquin once a day patient recommended oral probiotics and Tylenol for pain  Wound check in 2 days return to the emergency room for worsening of redness pain swelling or any discharge noted

## 2025-03-01 NOTE — ED PROVIDER NOTE - OBJECTIVE STATEMENT
74-year-old female came to the emergency room chief complaint of scratched by her own cat on the left hand happened 9 AM today patient wash the hands with soap and water and came to the emergency room patient has severe penicillin allergies in the past patient has been bitten by the cat and has got infected and she has been hospitalized Patient complains of pain is no redness in the hand

## 2025-03-05 ENCOUNTER — OUTPATIENT (OUTPATIENT)
Dept: OUTPATIENT SERVICES | Facility: HOSPITAL | Age: 75
LOS: 1 days | End: 2025-03-05
Payer: MEDICARE

## 2025-03-05 ENCOUNTER — APPOINTMENT (OUTPATIENT)
Dept: RADIOLOGY | Facility: HOSPITAL | Age: 75
End: 2025-03-05

## 2025-03-05 DIAGNOSIS — M25.661 STIFFNESS OF RIGHT KNEE, NOT ELSEWHERE CLASSIFIED: ICD-10-CM

## 2025-03-05 DIAGNOSIS — Z98.890 OTHER SPECIFIED POSTPROCEDURAL STATES: Chronic | ICD-10-CM

## 2025-03-05 DIAGNOSIS — C43.9 MALIGNANT MELANOMA OF SKIN, UNSPECIFIED: Chronic | ICD-10-CM

## 2025-03-05 PROBLEM — C34.90 MALIGNANT NEOPLASM OF UNSPECIFIED PART OF UNSPECIFIED BRONCHUS OR LUNG: Chronic | Status: ACTIVE | Noted: 2025-03-01

## 2025-03-05 PROCEDURE — 73562 X-RAY EXAM OF KNEE 3: CPT | Mod: 26,RT

## 2025-03-05 PROCEDURE — 73562 X-RAY EXAM OF KNEE 3: CPT

## 2025-03-07 NOTE — CHART NOTE - NSCHARTNOTEFT_GEN_A_CORE
74 y o female presenting to the ED on 03/01 with animal bite as per chart.  SW called to assist with scheduling the recommended primary care follow up appointment and received no answer.  Contact information was provided via voicemail.

## 2025-03-20 ENCOUNTER — APPOINTMENT (OUTPATIENT)
Dept: PULMONOLOGY | Facility: CLINIC | Age: 75
End: 2025-03-20
Payer: MEDICARE

## 2025-03-20 VITALS
TEMPERATURE: 97.6 F | OXYGEN SATURATION: 98 % | HEART RATE: 65 BPM | WEIGHT: 112 LBS | HEIGHT: 63 IN | DIASTOLIC BLOOD PRESSURE: 64 MMHG | BODY MASS INDEX: 19.84 KG/M2 | SYSTOLIC BLOOD PRESSURE: 118 MMHG | RESPIRATION RATE: 16 BRPM

## 2025-03-20 DIAGNOSIS — J43.9 EMPHYSEMA, UNSPECIFIED: ICD-10-CM

## 2025-03-20 PROCEDURE — G2211 COMPLEX E/M VISIT ADD ON: CPT

## 2025-03-20 PROCEDURE — 99214 OFFICE O/P EST MOD 30 MIN: CPT

## 2025-03-26 ENCOUNTER — APPOINTMENT (OUTPATIENT)
Dept: ULTRASOUND IMAGING | Facility: HOSPITAL | Age: 75
End: 2025-03-26
Payer: MEDICARE

## 2025-03-26 ENCOUNTER — OUTPATIENT (OUTPATIENT)
Dept: OUTPATIENT SERVICES | Facility: HOSPITAL | Age: 75
LOS: 1 days | End: 2025-03-26
Payer: MEDICARE

## 2025-03-26 DIAGNOSIS — R14.0 ABDOMINAL DISTENSION (GASEOUS): ICD-10-CM

## 2025-03-26 DIAGNOSIS — Z98.890 OTHER SPECIFIED POSTPROCEDURAL STATES: Chronic | ICD-10-CM

## 2025-03-26 DIAGNOSIS — C43.9 MALIGNANT MELANOMA OF SKIN, UNSPECIFIED: Chronic | ICD-10-CM

## 2025-03-26 PROCEDURE — 76700 US EXAM ABDOM COMPLETE: CPT | Mod: 26

## 2025-03-26 PROCEDURE — 76700 US EXAM ABDOM COMPLETE: CPT

## 2025-04-25 ENCOUNTER — OUTPATIENT (OUTPATIENT)
Dept: OUTPATIENT SERVICES | Facility: HOSPITAL | Age: 75
LOS: 1 days | End: 2025-04-25
Payer: MEDICARE

## 2025-04-25 ENCOUNTER — APPOINTMENT (OUTPATIENT)
Dept: RADIOLOGY | Facility: HOSPITAL | Age: 75
End: 2025-04-25
Payer: MEDICARE

## 2025-04-25 DIAGNOSIS — C43.9 MALIGNANT MELANOMA OF SKIN, UNSPECIFIED: Chronic | ICD-10-CM

## 2025-04-25 DIAGNOSIS — Z98.890 OTHER SPECIFIED POSTPROCEDURAL STATES: Chronic | ICD-10-CM

## 2025-04-25 DIAGNOSIS — S33.4XXA TRAUMATIC RUPTURE OF SYMPHYSIS PUBIS, INITIAL ENCOUNTER: ICD-10-CM

## 2025-04-25 PROCEDURE — 72100 X-RAY EXAM L-S SPINE 2/3 VWS: CPT

## 2025-04-25 PROCEDURE — 72100 X-RAY EXAM L-S SPINE 2/3 VWS: CPT | Mod: 26

## 2025-04-29 ENCOUNTER — APPOINTMENT (OUTPATIENT)
Dept: ENDOCRINOLOGY | Facility: CLINIC | Age: 75
End: 2025-04-29
Payer: MEDICARE

## 2025-04-29 VITALS
RESPIRATION RATE: 16 BRPM | SYSTOLIC BLOOD PRESSURE: 122 MMHG | WEIGHT: 113 LBS | DIASTOLIC BLOOD PRESSURE: 68 MMHG | HEART RATE: 63 BPM | OXYGEN SATURATION: 97 % | TEMPERATURE: 98 F | BODY MASS INDEX: 20.02 KG/M2 | HEIGHT: 63 IN

## 2025-04-29 DIAGNOSIS — R73.03 PREDIABETES.: ICD-10-CM

## 2025-04-29 DIAGNOSIS — E03.9 HYPOTHYROIDISM, UNSPECIFIED: ICD-10-CM

## 2025-04-29 DIAGNOSIS — M81.0 AGE-RELATED OSTEOPOROSIS W/OUT CURRENT PATHOLOGICAL FRACTURE: ICD-10-CM

## 2025-04-29 DIAGNOSIS — R53.83 OTHER FATIGUE: ICD-10-CM

## 2025-04-29 PROCEDURE — 99205 OFFICE O/P NEW HI 60 MIN: CPT

## 2025-04-30 LAB
BASOPHILS # BLD AUTO: 0.04 K/UL
BASOPHILS NFR BLD AUTO: 0.7 %
EOSINOPHIL # BLD AUTO: 0.08 K/UL
EOSINOPHIL NFR BLD AUTO: 1.4 %
HCT VFR BLD CALC: 37.8 %
HGB BLD-MCNC: 12.3 G/DL
IMM GRANULOCYTES NFR BLD AUTO: 0.7 %
LYMPHOCYTES # BLD AUTO: 1.06 K/UL
LYMPHOCYTES NFR BLD AUTO: 19.2 %
MAN DIFF?: NORMAL
MCHC RBC-ENTMCNC: 30.3 PG
MCHC RBC-ENTMCNC: 32.5 G/DL
MCV RBC AUTO: 93.1 FL
MONOCYTES # BLD AUTO: 0.53 K/UL
MONOCYTES NFR BLD AUTO: 9.6 %
NEUTROPHILS # BLD AUTO: 3.78 K/UL
NEUTROPHILS NFR BLD AUTO: 68.4 %
PLATELET # BLD AUTO: 240 K/UL
RBC # BLD: 4.06 M/UL
RBC # FLD: 12.4 %
WBC # FLD AUTO: 5.53 K/UL

## 2025-05-01 LAB
ALBUMIN SERPL ELPH-MCNC: 4.4 G/DL
ALP BLD-CCNC: 89 U/L
ALT SERPL-CCNC: 21 U/L
ANION GAP SERPL CALC-SCNC: 11 MMOL/L
AST SERPL-CCNC: 20 U/L
BILIRUB SERPL-MCNC: 0.3 MG/DL
BUN SERPL-MCNC: 21 MG/DL
CALCIUM SERPL-MCNC: 9.6 MG/DL
CHLORIDE SERPL-SCNC: 99 MMOL/L
CO2 SERPL-SCNC: 25 MMOL/L
CREAT SERPL-MCNC: 0.59 MG/DL
EGFRCR SERPLBLD CKD-EPI 2021: 95 ML/MIN/1.73M2
GLUCOSE SERPL-MCNC: 88 MG/DL
POTASSIUM SERPL-SCNC: 4.2 MMOL/L
PROT SERPL-MCNC: 6.7 G/DL
SODIUM SERPL-SCNC: 136 MMOL/L

## 2025-05-04 ENCOUNTER — OUTPATIENT (OUTPATIENT)
Dept: OUTPATIENT SERVICES | Facility: HOSPITAL | Age: 75
LOS: 1 days | Discharge: HOME | End: 2025-05-04

## 2025-05-04 DIAGNOSIS — Z98.890 OTHER SPECIFIED POSTPROCEDURAL STATES: Chronic | ICD-10-CM

## 2025-05-04 DIAGNOSIS — C43.9 MALIGNANT MELANOMA OF SKIN, UNSPECIFIED: ICD-10-CM

## 2025-05-04 DIAGNOSIS — C43.9 MALIGNANT MELANOMA OF SKIN, UNSPECIFIED: Chronic | ICD-10-CM

## 2025-05-06 ENCOUNTER — APPOINTMENT (OUTPATIENT)
Dept: ORTHOPEDIC SURGERY | Facility: CLINIC | Age: 75
End: 2025-05-06

## 2025-05-08 ENCOUNTER — APPOINTMENT (OUTPATIENT)
Dept: HEMATOLOGY ONCOLOGY | Facility: CLINIC | Age: 75
End: 2025-05-08
Payer: MEDICARE

## 2025-05-08 VITALS
DIASTOLIC BLOOD PRESSURE: 69 MMHG | RESPIRATION RATE: 16 BRPM | OXYGEN SATURATION: 99 % | BODY MASS INDEX: 19.92 KG/M2 | SYSTOLIC BLOOD PRESSURE: 126 MMHG | HEART RATE: 65 BPM | WEIGHT: 112.43 LBS | TEMPERATURE: 97.8 F

## 2025-05-08 DIAGNOSIS — C34.90 MALIGNANT NEOPLASM OF UNSPECIFIED PART OF UNSPECIFIED BRONCHUS OR LUNG: ICD-10-CM

## 2025-05-08 PROCEDURE — G2211 COMPLEX E/M VISIT ADD ON: CPT

## 2025-05-08 PROCEDURE — 99214 OFFICE O/P EST MOD 30 MIN: CPT

## 2025-05-27 ENCOUNTER — APPOINTMENT (OUTPATIENT)
Dept: PULMONOLOGY | Facility: CLINIC | Age: 75
End: 2025-05-27
Payer: MEDICARE

## 2025-05-27 VITALS
BODY MASS INDEX: 20.02 KG/M2 | HEIGHT: 63 IN | RESPIRATION RATE: 16 BRPM | OXYGEN SATURATION: 98 % | DIASTOLIC BLOOD PRESSURE: 68 MMHG | WEIGHT: 113 LBS | HEART RATE: 72 BPM | SYSTOLIC BLOOD PRESSURE: 120 MMHG | TEMPERATURE: 97.4 F

## 2025-05-27 DIAGNOSIS — J43.9 EMPHYSEMA, UNSPECIFIED: ICD-10-CM

## 2025-05-27 PROCEDURE — 94010 BREATHING CAPACITY TEST: CPT

## 2025-05-27 PROCEDURE — 99213 OFFICE O/P EST LOW 20 MIN: CPT | Mod: 25

## 2025-05-27 PROCEDURE — ZZZZZ: CPT

## 2025-05-27 PROCEDURE — 94729 DIFFUSING CAPACITY: CPT

## 2025-05-27 PROCEDURE — 94726 PLETHYSMOGRAPHY LUNG VOLUMES: CPT

## 2025-05-29 ENCOUNTER — NON-APPOINTMENT (OUTPATIENT)
Age: 75
End: 2025-05-29

## 2025-05-29 LAB — TSH SERPL-ACNC: 0.8 UIU/ML

## 2025-06-20 ENCOUNTER — APPOINTMENT (OUTPATIENT)
Dept: GASTROENTEROLOGY | Facility: CLINIC | Age: 75
End: 2025-06-20
Payer: MEDICARE

## 2025-06-20 VITALS
DIASTOLIC BLOOD PRESSURE: 72 MMHG | HEART RATE: 89 BPM | RESPIRATION RATE: 16 BRPM | OXYGEN SATURATION: 98 % | WEIGHT: 114 LBS | BODY MASS INDEX: 20.2 KG/M2 | HEIGHT: 63 IN | TEMPERATURE: 97.7 F | SYSTOLIC BLOOD PRESSURE: 143 MMHG

## 2025-06-20 PROCEDURE — 99215 OFFICE O/P EST HI 40 MIN: CPT

## 2025-06-20 PROCEDURE — G2211 COMPLEX E/M VISIT ADD ON: CPT

## 2025-06-26 ENCOUNTER — APPOINTMENT (OUTPATIENT)
Dept: CARDIOLOGY | Facility: CLINIC | Age: 75
End: 2025-06-26
Payer: MEDICARE

## 2025-06-26 ENCOUNTER — NON-APPOINTMENT (OUTPATIENT)
Age: 75
End: 2025-06-26

## 2025-06-26 VITALS
BODY MASS INDEX: 20.38 KG/M2 | SYSTOLIC BLOOD PRESSURE: 121 MMHG | OXYGEN SATURATION: 99 % | HEIGHT: 63 IN | DIASTOLIC BLOOD PRESSURE: 75 MMHG | WEIGHT: 115 LBS | HEART RATE: 54 BPM

## 2025-06-26 PROCEDURE — 93000 ELECTROCARDIOGRAM COMPLETE: CPT

## 2025-06-26 PROCEDURE — 99213 OFFICE O/P EST LOW 20 MIN: CPT | Mod: 25

## 2025-06-30 ENCOUNTER — APPOINTMENT (OUTPATIENT)
Dept: ENDOCRINOLOGY | Facility: CLINIC | Age: 75
End: 2025-06-30
Payer: MEDICARE

## 2025-06-30 VITALS
HEIGHT: 63 IN | TEMPERATURE: 97.6 F | RESPIRATION RATE: 16 BRPM | DIASTOLIC BLOOD PRESSURE: 70 MMHG | SYSTOLIC BLOOD PRESSURE: 124 MMHG | OXYGEN SATURATION: 98 % | BODY MASS INDEX: 20.02 KG/M2 | WEIGHT: 113 LBS | HEART RATE: 60 BPM

## 2025-06-30 PROCEDURE — 99214 OFFICE O/P EST MOD 30 MIN: CPT

## 2025-07-29 ENCOUNTER — NON-APPOINTMENT (OUTPATIENT)
Age: 75
End: 2025-07-29

## 2025-08-04 ENCOUNTER — APPOINTMENT (OUTPATIENT)
Dept: GASTROENTEROLOGY | Facility: HOSPITAL | Age: 75
End: 2025-08-04

## 2025-08-04 ENCOUNTER — OUTPATIENT (OUTPATIENT)
Dept: OUTPATIENT SERVICES | Facility: HOSPITAL | Age: 75
LOS: 1 days | End: 2025-08-04
Payer: MEDICARE

## 2025-08-04 ENCOUNTER — RESULT REVIEW (OUTPATIENT)
Age: 75
End: 2025-08-04

## 2025-08-04 ENCOUNTER — TRANSCRIPTION ENCOUNTER (OUTPATIENT)
Age: 75
End: 2025-08-04

## 2025-08-04 DIAGNOSIS — C43.9 MALIGNANT MELANOMA OF SKIN, UNSPECIFIED: Chronic | ICD-10-CM

## 2025-08-04 DIAGNOSIS — Z98.890 OTHER SPECIFIED POSTPROCEDURAL STATES: Chronic | ICD-10-CM

## 2025-08-04 PROCEDURE — 88305 TISSUE EXAM BY PATHOLOGIST: CPT

## 2025-08-04 PROCEDURE — 88305 TISSUE EXAM BY PATHOLOGIST: CPT | Mod: 26

## 2025-08-04 PROCEDURE — 43239 EGD BIOPSY SINGLE/MULTIPLE: CPT

## 2025-08-04 PROCEDURE — 88312 SPECIAL STAINS GROUP 1: CPT

## 2025-08-04 PROCEDURE — 88312 SPECIAL STAINS GROUP 1: CPT | Mod: 26

## 2025-08-04 RX ADMIN — Medication 75 MILLILITER(S): at 10:35

## 2025-08-05 LAB — SURGICAL PATHOLOGY STUDY: SIGNIFICANT CHANGE UP

## 2025-09-20 ENCOUNTER — NON-APPOINTMENT (OUTPATIENT)
Age: 75
End: 2025-09-20

## (undated) DEVICE — ELCTR BOVIE TIP BLADE INSULATED 2.75" EDGE

## (undated) DEVICE — FORCEP RADIAL JAW 4 W NDL 2.4MM 2.8MM 240CM ORANGE DISP

## (undated) DEVICE — TUBING STRYKEFLOW II SUCTION / IRRIGATOR

## (undated) DEVICE — BLADE SURGICAL #15 CARBON

## (undated) DEVICE — GOWN XL

## (undated) DEVICE — CONTAINER FORMALIN BUFF 10% 60ML

## (undated) DEVICE — XI ARM GRASPER BIPOLAR LONG 8MM

## (undated) DEVICE — XI ARM PERMANENT CAUTERY SPATULA

## (undated) DEVICE — GRASPER LAPA 5MMX35CM

## (undated) DEVICE — XI OBTURATOR OPTICAL BLADELESS 8MM

## (undated) DEVICE — DRAPE INSTRUMENT POUCH 6.75" X 11"

## (undated) DEVICE — TROCAR COVIDIEN VERSASTEP PLUS 15MM STANDARD

## (undated) DEVICE — CHEST DRAIN OASIS DRY SUCTION WATER SEAL

## (undated) DEVICE — NDL HYPO REGULAR BEVEL 22G X 1.5" (TURQUOISE)

## (undated) DEVICE — SUT PDS II 2-0 27" SH

## (undated) DEVICE — SUT MONOCRYL 4-0 27" PS-2 UNDYED

## (undated) DEVICE — SUT SURGIPRO 0 30" GS-22

## (undated) DEVICE — SPECIMEN CONTAINER 100ML

## (undated) DEVICE — TUBING SUCTION 20FT

## (undated) DEVICE — PACK ROBOTIC LIJ

## (undated) DEVICE — TUBING CAP SET ERBEFLO CLEVERCAP HYBRID CO2 FOR OLYMPUS SCOPES AND UCR

## (undated) DEVICE — POSITIONER FOAM HEAD CRADLE (PINK)

## (undated) DEVICE — SOL IRR POUR H2O 1000ML

## (undated) DEVICE — ENDOCATCH GENERAL 10MM (PURPLE)

## (undated) DEVICE — XI DRAPE ARM

## (undated) DEVICE — KIT ENDO PROCEDURE CUST W/VLV

## (undated) DEVICE — ENDOCATCH GENERAL 15MM (PURPLE)

## (undated) DEVICE — FOLEY TRAY 16FR 5CC LTX UMETER CLOSED

## (undated) DEVICE — TUBING OLYMPUS INSUFFLATION

## (undated) DEVICE — WARMING BLANKET UPPER ADULT

## (undated) DEVICE — XI SEAL UNIV 5- 8 MM

## (undated) DEVICE — SOL IRR POUR NS 0.9% 500ML

## (undated) DEVICE — DRSG CURITY GAUZE SPONGE 4 X 4" 12-PLY

## (undated) DEVICE — D HELP - CLEARVIEW CLEARIFY SYSTEM

## (undated) DEVICE — WARMING BLANKET LOWER ADULT

## (undated) DEVICE — DRSG TEGADERM 2.5X3"

## (undated) DEVICE — SUT POLYSORB 4-0 P-12 UNDYED

## (undated) DEVICE — XI ARM GRASPER TIP UP FENESTRATED

## (undated) DEVICE — XI ARM CLIP APPLIER LARGE

## (undated) DEVICE — DRSG GAUZE PACKTNER ROLL

## (undated) DEVICE — XI ARM FORCEP CADIERE 8MM

## (undated) DEVICE — XI DRAPE COLUMN

## (undated) DEVICE — SUT SILK 0 24" SH DA

## (undated) DEVICE — SUT VICRYL 0 27" UR-6

## (undated) DEVICE — VENODYNE/SCD SLEEVE CALF MEDIUM

## (undated) DEVICE — BLADE SURGICAL #10 STAINLESS

## (undated) DEVICE — SUT POLYSORB 2-0 30" V-20 UNDYED